# Patient Record
Sex: FEMALE | Race: WHITE | NOT HISPANIC OR LATINO | Employment: FULL TIME | URBAN - METROPOLITAN AREA
[De-identification: names, ages, dates, MRNs, and addresses within clinical notes are randomized per-mention and may not be internally consistent; named-entity substitution may affect disease eponyms.]

---

## 2017-03-09 ENCOUNTER — ALLSCRIPTS OFFICE VISIT (OUTPATIENT)
Dept: OTHER | Facility: OTHER | Age: 47
End: 2017-03-09

## 2017-04-20 ENCOUNTER — ALLSCRIPTS OFFICE VISIT (OUTPATIENT)
Dept: OTHER | Facility: OTHER | Age: 47
End: 2017-04-20

## 2017-08-01 ENCOUNTER — ALLSCRIPTS OFFICE VISIT (OUTPATIENT)
Dept: OTHER | Facility: OTHER | Age: 47
End: 2017-08-01

## 2017-08-01 DIAGNOSIS — R10.2 PELVIC AND PERINEAL PAIN: ICD-10-CM

## 2017-08-02 ENCOUNTER — HOSPITAL ENCOUNTER (OUTPATIENT)
Dept: ULTRASOUND IMAGING | Facility: HOSPITAL | Age: 47
Discharge: HOME | End: 2017-08-02
Payer: COMMERCIAL

## 2017-08-02 DIAGNOSIS — R10.2 PELVIC AND PERINEAL PAIN: ICD-10-CM

## 2017-08-02 PROCEDURE — 76830 TRANSVAGINAL US NON-OB: CPT

## 2017-08-02 PROCEDURE — 76856 US EXAM PELVIC COMPLETE: CPT

## 2017-10-05 ENCOUNTER — ALLSCRIPTS OFFICE VISIT (OUTPATIENT)
Dept: OTHER | Facility: OTHER | Age: 47
End: 2017-10-05

## 2017-10-05 DIAGNOSIS — M25.512 PAIN IN LEFT SHOULDER: ICD-10-CM

## 2017-10-25 ENCOUNTER — APPOINTMENT (OUTPATIENT)
Dept: PHYSICAL THERAPY | Facility: CLINIC | Age: 47
End: 2017-10-25
Payer: COMMERCIAL

## 2017-10-25 DIAGNOSIS — M25.512 PAIN IN LEFT SHOULDER: ICD-10-CM

## 2017-10-25 PROCEDURE — 97162 PT EVAL MOD COMPLEX 30 MIN: CPT

## 2017-10-25 PROCEDURE — G8985 CARRY GOAL STATUS: HCPCS

## 2017-10-25 PROCEDURE — 97110 THERAPEUTIC EXERCISES: CPT

## 2017-10-25 PROCEDURE — G8984 CARRY CURRENT STATUS: HCPCS

## 2017-10-31 ENCOUNTER — APPOINTMENT (OUTPATIENT)
Dept: PHYSICAL THERAPY | Facility: CLINIC | Age: 47
End: 2017-10-31
Payer: COMMERCIAL

## 2017-10-31 PROCEDURE — 97140 MANUAL THERAPY 1/> REGIONS: CPT

## 2017-10-31 PROCEDURE — 97110 THERAPEUTIC EXERCISES: CPT

## 2017-11-03 ENCOUNTER — APPOINTMENT (OUTPATIENT)
Dept: PHYSICAL THERAPY | Facility: CLINIC | Age: 47
End: 2017-11-03
Payer: COMMERCIAL

## 2017-11-03 PROCEDURE — 97140 MANUAL THERAPY 1/> REGIONS: CPT

## 2017-11-03 PROCEDURE — 97110 THERAPEUTIC EXERCISES: CPT

## 2017-11-06 ENCOUNTER — APPOINTMENT (OUTPATIENT)
Dept: PHYSICAL THERAPY | Facility: CLINIC | Age: 47
End: 2017-11-06
Payer: COMMERCIAL

## 2017-11-06 PROCEDURE — 97110 THERAPEUTIC EXERCISES: CPT

## 2017-11-06 PROCEDURE — 97140 MANUAL THERAPY 1/> REGIONS: CPT

## 2017-11-08 ENCOUNTER — APPOINTMENT (OUTPATIENT)
Dept: PHYSICAL THERAPY | Facility: CLINIC | Age: 47
End: 2017-11-08
Payer: COMMERCIAL

## 2017-11-08 PROCEDURE — 97140 MANUAL THERAPY 1/> REGIONS: CPT

## 2017-11-08 PROCEDURE — 97112 NEUROMUSCULAR REEDUCATION: CPT

## 2017-11-08 PROCEDURE — 97110 THERAPEUTIC EXERCISES: CPT

## 2017-11-13 ENCOUNTER — APPOINTMENT (OUTPATIENT)
Dept: PHYSICAL THERAPY | Facility: CLINIC | Age: 47
End: 2017-11-13
Payer: COMMERCIAL

## 2017-11-14 ENCOUNTER — APPOINTMENT (OUTPATIENT)
Dept: PHYSICAL THERAPY | Facility: CLINIC | Age: 47
End: 2017-11-14
Payer: COMMERCIAL

## 2017-11-14 PROCEDURE — 97110 THERAPEUTIC EXERCISES: CPT

## 2017-11-14 PROCEDURE — 97112 NEUROMUSCULAR REEDUCATION: CPT

## 2017-11-14 PROCEDURE — 97140 MANUAL THERAPY 1/> REGIONS: CPT

## 2017-11-21 ENCOUNTER — APPOINTMENT (OUTPATIENT)
Dept: PHYSICAL THERAPY | Facility: CLINIC | Age: 47
End: 2017-11-21
Payer: COMMERCIAL

## 2017-11-27 ENCOUNTER — APPOINTMENT (OUTPATIENT)
Dept: PHYSICAL THERAPY | Facility: CLINIC | Age: 47
End: 2017-11-27
Payer: COMMERCIAL

## 2017-11-27 PROCEDURE — 97140 MANUAL THERAPY 1/> REGIONS: CPT

## 2017-11-27 PROCEDURE — 97110 THERAPEUTIC EXERCISES: CPT

## 2017-11-29 ENCOUNTER — APPOINTMENT (OUTPATIENT)
Dept: PHYSICAL THERAPY | Facility: CLINIC | Age: 47
End: 2017-11-29
Payer: COMMERCIAL

## 2018-01-10 NOTE — PROGRESS NOTES
Assessment    1  Encounter for preventive health examination (V70 0) (Z00 00)   2  Need for vaccination (V05 9) (Z23)   3  Mild vitamin D deficiency (268 9) (E55 9)    Plan  Asthma    · Ventolin  (90 Base) MCG/ACT Inhalation Aerosol Solution; INHALE 1 TO  2 PUFFS EVERY 4 TO 6 HOURS AS NEEDED  Essential hypertension    · (1) BASIC METABOLIC PROFILE; Status:Active; Requested for:01Sep2017;    · (1) CBC/PLT/DIFF; Status:Active; Requested for:01Sep2017;    · (1) HEPATIC FUNCTION PANEL; Status:Active; Requested for:01Sep2017; Health Maintenance    · Propranolol HCl - 10 MG Oral Tablet; 1 tab bid prn  Mild vitamin D deficiency    · Vitamin D3 2000 UNIT Oral Tablet; 1 PO QD  Need for vaccination    · Tdap (Boostrix); Inject one dose; To Be Done: [id number]  Screening for cardiovascular condition    · (1) LIPID PANEL, FASTING; Status:Active; Requested for:01Sep2017;     Discussion/Summary  health maintenance visit Currently, she eats a healthy diet  the risks and benefits of cervical cancer screening were discussed cervical cancer screening is current Breast cancer screening: the risks and benefits of breast cancer screening were discussed, monthly self breast exam was advised and mammogram is current  Colorectal cancer screening: the risks and benefits of colorectal cancer screening were discussed and colorectal cancer screening is not indicated  Osteoporosis screening: the risks and benefits of osteoporosis screening were discussed  Advice and education were given regarding nutrition, aerobic exercise, weight bearing exercise, weight loss, calcium supplements, vitamin D supplements, alcohol use, sunscreen use and seat belt use  Patient discussion: discussed with the patient  Keep up the great work, follow-up for physical after labs in one year and sooner as needed  Chief Complaint  Well Visit      History of Present Illness  HM, Adult Female:  The patient is being seen for a health maintenance evaluation  The last health maintenance visit was 12 month(s) ago  General Health: The patient's health since the last visit is described as good  She has regular dental visits  She denies vision problems  She denies hearing loss  Immunizations status: up to date  Lifestyle:  She consumes a diverse and healthy diet  She does not have any weight concerns  She exercises regularly  She does not use tobacco  She consumes alcohol  She denies drug use  Reproductive health: the patient is premenopausal   she reports normal menses  she uses contraception  she is sexually active  pregnancy history:  Screening: cancer screening reviewed and current  metabolic screening reviewed and current  risk screening reviewed and current  HPI: Feeling well  Exercising 7 days per week, added resistance when wt loss plateaued  Doing [employer] and well as [employer]         Review of Systems    Constitutional: No fever, no chills, feels well, no tiredness, no recent weight gain or weight loss  Eyes: No complaints of eye pain, no red eyes, no eyesight problems, no discharge, no dry eyes, no itching of eyes  ENT: no complaints of earache, no loss of hearing, no nose bleeds, no nasal discharge, no sore throat, no hoarseness  Cardiovascular: No complaints of slow heart rate, no fast heart rate, no chest pain, no palpitations, no leg claudication, no lower extremity edema  Respiratory: No complaints of shortness of breath, no wheezing, no cough, no SOB on exertion, no orthopnea, no PND  Gastrointestinal: No complaints of abdominal pain, no constipation, no nausea or vomiting, no diarrhea, no bloody stools  Genitourinary: No complaints of dysuria, no incontinence, no pelvic pain, no dysmenorrhea, no vaginal discharge or bleeding  Musculoskeletal: No complaints of arthralgias, no myalgias, no joint swelling or stiffness, no limb pain or swelling     Integumentary: No complaints of skin rash or lesions, no itching, no skin wounds, no breast pain or lump  Neurological: No complaints of headache, no confusion, no convulsions, no numbness, no dizziness or fainting, no tingling, no limb weakness, no difficulty walking  Psychiatric: Not suicidal, no sleep disturbance, no anxiety or depression, no change in personality, no emotional problems  Endocrine: No complaints of proptosis, no hot flashes, no muscle weakness, no deepening of the voice, no feelings of weakness  Hematologic/Lymphatic: No complaints of swollen glands, no swollen glands in the neck, does not bleed easily, does not bruise easily  Active Problems    1  Acute bronchitis (466 0) (J20 9)   2  Allergy (995 3) (T78 40XA)   3  Anxiety (300 00) (F41 9)   4  Asthma (493 90) (J45 909)   5  Asthma exacerbation (493 92) (J45 901)   6  Essential hypertension (401 9) (I10)   7  Influenza (487 1) (J11 1)   8  Need for influenza vaccination (V04 81) (Z23)   9  Plantar fasciitis (728 71) (M72 2)   10  Tongue burning sensation (529 6) (K14 6)   11  White coat hypertension (796 2) (R03 0)    Surgical History    · History of Wrist Surgery    Family History  Mother    · Family history of hypertension (V17 49) (Z82 49)   · Family history of hypoglycemia (V18 19) (Z83 49)   · Family history of Type 2 diabetes, controlled, with neuropathy  Father    · Family history of hypertension (V17 49) (Z82 49)   · Family history of Type 2 diabetes, controlled, with neuropathy    Social History    · Alcohol use   · Former smoker (V15 82) (M35 867)   · No drug use    Current Meds   1  Allegra 60 MG CAPS Recorded   2  Fluticasone Propionate 50 MCG/ACT Nasal Suspension; USE 2 SPRAYS IN EACH   NOSTRIL ONCE DAILY Recorded   3  Ipratropium-Albuterol 0 5-2 5 (3) MG/3ML Inhalation Solution; USE 1 UNIT DOSE IN   NEBULIZER EVERY 4 HOURS AS NEEDED; Therapy: 29QFZ7272 to (Last Rx:22Rvr7830)  Requested for: 46Ebg7858 Ordered   4   Montelukast Sodium 10 MG Oral Tablet; take 1 tablet by mouth every day; Therapy: 37FKU4194 to (Last Rx:60Oos4334)  Requested for: 94Tru5493 Ordered   5  Mucinex DM Maximum Strength  MG Oral Tablet Extended Release 12 Hour   Recorded   6  Propranolol HCl - 10 MG Oral Tablet; 1 tab bid prn  Requested for: 01Sep2015; Last   Rx:01Sep2015 Ordered   7  Ventolin  (90 Base) MCG/ACT Inhalation Aerosol Solution; INHALE 1 TO 2   PUFFS EVERY 4 TO 6 HOURS AS NEEDED; Therapy: 01Sep2015 to (Last Rx:01Sep2015)  Requested for: 01Sep2015 Ordered    Allergies    1  Aspirin TABS   2  Penicillins    Vitals   Recorded: 28ZGT1446 06:69TI   Systolic 233   Diastolic 82   Heart Rate 78   Respiration 14   Height 5 ft 5 in   Weight 161 lb 2 08 oz   BMI Calculated 26 81   BSA Calculated 1 81     Physical Exam    Constitutional   General appearance: No acute distress, well appearing and well nourished  Eyes   Conjunctiva and lids: No swelling, erythema or discharge  Pupils and irises: Equal, round and reactive to light  Ears, Nose, Mouth, and Throat   External inspection of ears and nose: Normal     Otoscopic examination: Tympanic membranes translucent with normal light reflex  Canals patent without erythema  Oropharynx: Normal with no erythema, edema, exudate or lesions  Pulmonary   Respiratory effort: No increased work of breathing or signs of respiratory distress  Auscultation of lungs: Clear to auscultation  Cardiovascular   Auscultation of heart: Normal rate and rhythm, normal S1 and S2, without murmurs  Examination of extremities for edema and/or varicosities: Normal     Abdomen   Abdomen: Non-tender, no masses  Liver and spleen: No hepatomegaly or splenomegaly  Lymphatic   Palpation of lymph nodes in neck: No lymphadenopathy  Musculoskeletal   Gait and station: Normal     Digits and nails: Normal without clubbing or cyanosis      Inspection/palpation of joints, bones, and muscles: Normal     Skin   Skin and subcutaneous tissue: Normal without rashes or lesions  Neurologic   Cranial nerves: Cranial nerves 2-12 intact  Psychiatric   Orientation to person, place, and time: Normal     Mood and affect: Normal        Results/Data  PHQ-2 Adult Depression Screening 13Tyc5628 11:20AM User, s     Test Name Result Flag Reference   PHQ-2 Adult Depression Score 0     Over the last two weeks, how often have you been bothered by any of the following problems?   Little interest or pleasure in doing things: Not at all - 0  Feeling down, depressed, or hopeless: Not at all - 0   PHQ-2 Adult Depression Screening Negative         Signatures   Electronically signed by : MARY Scott ; Sep 13 2016 11:58AM EST                       (Author)

## 2018-01-13 VITALS
BODY MASS INDEX: 26.66 KG/M2 | WEIGHT: 160 LBS | TEMPERATURE: 98.3 F | OXYGEN SATURATION: 98 % | HEIGHT: 65 IN | SYSTOLIC BLOOD PRESSURE: 154 MMHG | DIASTOLIC BLOOD PRESSURE: 92 MMHG | HEART RATE: 72 BPM

## 2018-01-14 VITALS
DIASTOLIC BLOOD PRESSURE: 98 MMHG | WEIGHT: 159.38 LBS | SYSTOLIC BLOOD PRESSURE: 152 MMHG | BODY MASS INDEX: 26.55 KG/M2 | HEIGHT: 65 IN

## 2018-01-17 ENCOUNTER — ALLSCRIPTS OFFICE VISIT (OUTPATIENT)
Dept: OTHER | Facility: OTHER | Age: 48
End: 2018-01-17

## 2018-01-17 NOTE — PROGRESS NOTES
Assessment    1  Asthma exacerbation (493 92) (J45 901)   2  Allergy (995 3) (T78 40XA)    Plan  Allergy    · Fluticasone Propionate 50 MCG/ACT Nasal Suspension; USE 2 SPRAYS IN  EACH NOSTRIL ONCE DAILY  Asthma    · Advair Diskus 100-50 MCG/DOSE Inhalation Aerosol Powder Breath Activated;  INHALE 1 PUFF TWICE DAILY   · PredniSONE 10 MG Oral Tablet; take 3 tabs daily x 2 days then 2 tabs daily x 2 days  then 1 tab x 1 day    Discussion/Summary    Exacerbation of asthma she has no wheezing office today likely worsen at nighttime secondary to postnasal drip  Continue Allegra, add Flonase consider restarting Singulair prescription for Advair given if symptoms are not improving over the next 2-3 days okay to start prednisone contact us if not improving  Chief Complaint  Not feeling well      History of Present Illness  HPI: Patient states she started with typical cold symptoms then she seemed to get better within a day or so later she noticed a heaviness and wheezing she does have history of asthma  She was using her nebulizer nebulizer 2 times a day and rescue inhaler if needed she is taking Allegra and Mucinex  Review of Systems    Constitutional: as noted in HPI    ENT: as noted in HPI  Cardiovascular: no complaints of slow or fast heart rate, no chest pain, no palpitations, no leg claudication or lower extremity edema  Respiratory: as noted in HPI  Breasts: no complaints of breast pain, breast lump or nipple discharge  Gastrointestinal: no complaints of abdominal pain, no constipation, no nausea or diarrhea, no vomiting, no bloody stools  Active Problems    1  Acute bronchitis (466 0) (J20 9)   2  Allergy (995 3) (T78 40XA)   3  Anxiety (300 00) (F41 9)   4  Asthma (493 90) (J45 909)   5  Asthma exacerbation (493 92) (J45 901)   6  Essential hypertension (401 9) (I10)   7  Influenza (487 1) (J11 1)   8  Mild vitamin D deficiency (268 9) (E55 9)   9   Need for influenza vaccination (V04 81) (Z23)   10  Need for vaccination (V05 9) (Z23)   11  Plantar fasciitis (728 71) (M72 2)   12  Screening for cardiovascular condition (V81 2) (Z13 6)   13  Tongue burning sensation (529 6) (K14 6)   14  White coat hypertension    Past Medical History  Active Problems And Past Medical History Reviewed: The active problems and past medical history were reviewed and updated today  Surgical History  Surgical History Reviewed: The surgical history was reviewed and updated today  Social History    · Alcohol use   · Former smoker (I21 75) (E36 327)   · No drug use  The social history was reviewed and updated today  Family History  Family History Reviewed: The family history was reviewed and updated today  Current Meds   1  Allegra 60 MG CAPS Recorded   2  Fluticasone Propionate 50 MCG/ACT Nasal Suspension; USE 2 SPRAYS IN EACH   NOSTRIL ONCE DAILY Recorded   3  Ipratropium-Albuterol 0 5-2 5 (3) MG/3ML Inhalation Solution; USE 1 UNIT DOSE IN   NEBULIZER EVERY 4 HOURS AS NEEDED; Therapy: 66SAR6337 to (Last Rx:58Fge2614)  Requested for: 20Rpn7333 Ordered   4  Montelukast Sodium 10 MG Oral Tablet; take 1 tablet by mouth every day; Therapy: 59BEK6386 to (Last Rx:50Kdv3553)  Requested for: 21Clt8427 Ordered   5  Propranolol HCl - 10 MG Oral Tablet; 1 tab bid prn  Requested for: 66Szd1846; Last   Rx:50Yws9867 Ordered   6  Ventolin  (90 Base) MCG/ACT Inhalation Aerosol Solution; INHALE 1 TO 2 PUFFS   EVERY 4 TO 6 HOURS AS NEEDED; Therapy: 83Mpg5936 to (Last Rx:35Nzj8121)  Requested for: 97Rip3818 Ordered   7  Vitamin D3 2000 UNIT Oral Tablet; 1 PO QD; Therapy: 74Xaj6920 to (Last Rx:94Zvr6336) Ordered    The medication list was reviewed and updated today  Allergies    1  Aspirin TABS   2   Penicillins    Vitals   Recorded: 76VXE2806 11:23AM   Heart Rate 80   Systolic 753   Diastolic 98   Height 5 ft 5 in   Weight 153 lb    BMI Calculated 25 46   BSA Calculated 1 77     Physical Exam    Constitutional   General appearance: No acute distress, well appearing and well nourished  Pulmonary   Auscultation of lungs: Clear to auscultation  Cardiovascular   Auscultation of heart: Normal rate and rhythm, normal S1 and S2, without murmurs  Abdomen   Abdomen: Non-tender, no masses  Lymphatic   Palpation of lymph nodes in neck: No lymphadenopathy  Skin   Skin and subcutaneous tissue: Normal without rashes or lesions  Future Appointments    Date/Time Provider Specialty Site   09/15/2017 12:30 PM MARY Riggs   Internal Medicine [facility]     Signatures   Electronically signed by : LEIF Mayo; Mar  9 2017 12:40PM EST                       (Author)    Electronically signed by : MARY Curran ; Mar 10 2017 12:39PM EST

## 2018-01-17 NOTE — PROGRESS NOTES
Assessment    1  Encounter for preventive health examination (V70 0) (Z00 00)   2  Allergy (995 3) (T78 40XA)   3  Anxiety (300 00) (F41 9)   4  Asthma (493 90) (J45 909)   5  Mild vitamin D deficiency (268 9) (E55 9)   6  Left shoulder pain (719 41) (M25 512)    Plan  Asthma    · Ventolin  (90 Base) MCG/ACT Inhalation Aerosol Solution; INHALE 1 TO  2 PUFFS EVERY 4 TO 6 HOURS AS NEEDED  Health Maintenance    · Propranolol HCl - 10 MG Oral Tablet; 1 tab bid prn  Left shoulder pain    · *1 - [facility]  *  Status: Active  Requested for: 10WFR3660  Care Summary provided  : Yes  Need for influenza vaccination    · Fluzone Quadrivalent Intramuscular Suspension    Discussion/Summary  health maintenance visit cervical cancer screening is current Breast cancer screening: monthly self breast exam was advised and mammogram is current  Colorectal cancer screening: colorectal cancer screening is not indicated  Osteoporosis screening: bone mineral density testing is not indicated  Advice and education were given regarding nutrition, aerobic exercise, weight bearing exercise, vitamin D supplements and sunscreen use  Lab data reviewed in detail and compared to prior, all normal    Anxiety-agree with seeing therapist, continue with propranolol as needed for presentations, okay to repeat dose after 3-4 hours     Asthma and allergy are stable on present regimen    Mild vitamin D deficiency-I recommend 800-2000 international units vitamin D 3 supplements daily    Left shoulder pain consistent with rotator cuff tendinopathy-pathophysiology discussed-refer for physical therapy  Routine follow-up in one year, sooner as needed  Chief Complaint  Well Visit      History of Present Illness  HM, Adult Female: The patient is being seen for a health maintenance evaluation  The last health maintenance visit was 14 month(s) ago  General Health:  The patient's health since the last visit is described as good  She has regular dental visits  She denies vision problems  She denies hearing loss  Immunizations status: up to date  Lifestyle:  She consumes a diverse and healthy diet  She has weight concerns  She exercises regularly  She does not use tobacco  She consumes alcohol  She denies drug use  Reproductive health: the patient is perimenopausal   she reports normal menses  she uses contraception  she is sexually active  pregnancy history:  Screening: cancer screening reviewed and current  metabolic screening reviewed and current  risk screening reviewed and current  HPI: Feeling generally well  Gained some wt back over the summer eating w/ kids home from college  Exercising 6 days per week, cardio, wts  No exertional sx  Asthma has been stable, using nasonex otc, hasn't used singulair or allegra  rarely needs proair or nebs  Anxiety-using propranolol only prior to doing a presentation, but 8 hrs of training and wears off  Wants to consider seeing a therapist, has seen Reji Santiago  Notes persistent L shoulder pain, worse w/ certain movements  No trouble sleeping  No trauma  Pain is 2/10  Hasn't taken anything or done anything  Vit D deficiency-admits not taking supplements  Review of Systems    Constitutional: No fever, no chills, feels well, no tiredness, no recent weight gain or weight loss  Eyes: No complaints of eye pain, no red eyes, no eyesight problems, no discharge, no dry eyes, no itching of eyes  ENT: no complaints of earache, no loss of hearing, no nose bleeds, no nasal discharge, no sore throat, no hoarseness  Cardiovascular: No complaints of slow heart rate, no fast heart rate, no chest pain, no palpitations, no leg claudication, no lower extremity edema  Respiratory: No complaints of shortness of breath, no wheezing, no cough, no SOB on exertion, no orthopnea, no PND     Gastrointestinal: No complaints of abdominal pain, no constipation, no nausea or vomiting, no diarrhea, no bloody stools  Genitourinary: No complaints of dysuria, no incontinence, no pelvic pain, no dysmenorrhea, no vaginal discharge or bleeding  Musculoskeletal: No complaints of arthralgias, no myalgias, no joint swelling or stiffness, no limb pain or swelling  Integumentary: No complaints of skin rash or lesions, no itching, no skin wounds, no breast pain or lump  Neurological: No complaints of headache, no confusion, no convulsions, no numbness, no dizziness or fainting, no tingling, no limb weakness, no difficulty walking  Psychiatric: Not suicidal, no sleep disturbance, no anxiety or depression, no change in personality, no emotional problems  Endocrine: No complaints of proptosis, no hot flashes, no muscle weakness, no deepening of the voice, no feelings of weakness  Hematologic/Lymphatic: No complaints of swollen glands, no swollen glands in the neck, does not bleed easily, does not bruise easily  Active Problems    1  Acute bronchitis (466 0) (J20 9)   2  Acute otitis externa (380 10) (H60 509)   3  Allergy (995 3) (T78 40XA)   4  Anxiety (300 00) (F41 9)   5  Asthma (493 90) (J45 909)   6  Asthma exacerbation (493 92) (J45 901)   7  Essential hypertension (401 9) (I10)   8  Female pelvic pain (625 9) (R10 2)   9  Influenza (487 1) (J11 1)   10  Mild vitamin D deficiency (268 9) (E55 9)   11  Need for influenza vaccination (V04 81) (Z23)   12  Need for vaccination (V05 9) (Z23)   13  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)   14  Plantar fasciitis (728 71) (M72 2)   15  Screening for cardiovascular condition (V81 2) (Z13 6)   16  Tongue burning sensation (529 6) (K14 6)   17   White coat hypertension    Surgical History    · History of Wrist Surgery    Family History  Mother    · Family history of hypertension (V17 49) (Z82 49)   · Family history of hypoglycemia (V18 19) (Z83 49)   · Family history of Type 2 diabetes, controlled, with neuropathy  Father    · Family history of hypertension (V17 49) (Z82 49)   · Family history of Type 2 diabetes, controlled, with neuropathy    Social History    · Alcohol use   · Former smoker (V15 82) (G55 675)   · No drug use    Current Meds   1  Allegra 60 MG CAPS Recorded   2  Fluticasone Propionate 50 MCG/ACT Nasal Suspension; USE 2 SPRAYS IN EACH   NOSTRIL ONCE DAILY  Requested for: 32POI9239; Last Rx:09Mar2017 Ordered   3  Ipratropium-Albuterol 0 5-2 5 (3) MG/3ML Inhalation Solution; USE 1 UNIT DOSE IN   NEBULIZER EVERY 4 HOURS AS NEEDED; Therapy: 04VOZ2629 to (Last Rx:40Nig3075)  Requested for: 11Gqz1826 Ordered   4  Propranolol HCl - 10 MG Oral Tablet; 1 tab bid prn  Requested for: 63Ooo8291; Last   Rx:30Sle9754 Ordered   5  Ventolin  (90 Base) MCG/ACT Inhalation Aerosol Solution; INHALE 1 TO 2   PUFFS EVERY 4 TO 6 HOURS AS NEEDED; Therapy: 28Wmw6395 to (Last Rx:03Pbr1819)  Requested for: 84Ttu1486 Ordered   6  Vitamin D3 2000 UNIT Oral Tablet; 1 PO QD; Therapy: 13Rdw8315 to (Last Rx:82Zgd9137) Ordered    Allergies    1  Aspirin TABS   2  Penicillins    Vitals   Recorded: 11ZWN2577 02:06PM   Heart Rate 80   Respiration 14   Systolic 332   Diastolic 86   Height 5 ft 5 in   Weight 163 lb 4 oz   BMI Calculated 27 17   BSA Calculated 1 81     Physical Exam    Constitutional   General appearance: No acute distress, well appearing and well nourished  Eyes   Conjunctiva and lids: No swelling, erythema or discharge  Pupils and irises: Equal, round and reactive to light  Ears, Nose, Mouth, and Throat   External inspection of ears and nose: Normal     Oropharynx: Normal with no erythema, edema, exudate or lesions  Pulmonary   Respiratory effort: No increased work of breathing or signs of respiratory distress  Auscultation of lungs: Clear to auscultation  Cardiovascular   Auscultation of heart: Normal rate and rhythm, normal S1 and S2, without murmurs      Examination of extremities for edema and/or varicosities: Normal     Abdomen   Abdomen: Non-tender, no masses  Liver and spleen: No hepatomegaly or splenomegaly  Lymphatic   Palpation of lymph nodes in neck: No lymphadenopathy  Musculoskeletal   Gait and station: Normal     Inspection/palpation of joints, bones, and muscles: Abnormal   Bilateral shoulders with full range of motion, proximal and distal strength 5 out of 5 and equal bilaterally, increased pain with external rotation and abduction beyond 15 degrees  of the left shoulder  Skin   Skin and subcutaneous tissue: Normal without rashes or lesions  Neurologic   Cranial nerves: Cranial nerves 2-12 intact      Psychiatric   Orientation to person, place, and time: Normal     Mood and affect: Normal        Signatures   Electronically signed by : MARY Meyer ; Oct  5 2017  2:47PM EST                       (Author)

## 2018-01-18 NOTE — PROGRESS NOTES
Assessment   1  Asthma exacerbation (493 92) (J45 901)   2  Anxiety (300 00) (F41 9)   3  Essential hypertension (401 9) (I10)    Plan   Asthma exacerbation    · PredniSONE 10 MG Oral Tablet; TAKE 4 TABLETS DAILY FOR 2 DAYS,3 TABLETS DAILY FOR    2 DAYS, 2 TABLETS DAILY FOR 2 DAYS AND 1 TABLET a day   · Follow Up if Not Better Evaluation and Treatment  Follow-up  Status: Complete  Done: [id number]   · Follow-up as previously scheduled Evaluation and Treatment  Follow-up  Status: Complete  Done:    [id number]    Discussion/Summary   Discussion Summary:    No sign of any infection she will continue all of her treatments add a course of steroids should return if she worsens  Medication SE Review and Pt Understands Tx: Possible side effects of new medications were reviewed with the patient/guardian today  The treatment plan was reviewed with the patient/guardian  The patient/guardian understands and agrees with the treatment plan      Chief Complaint   Chief Complaint Free Text Note Form: Wheezing shortness of breath      History of Present Illness   HPI: She is having increased wheezing shortness of breath over the past 2 weeks  She is using her inhalers and nebulizers more and more without any lasting benefit today she began taking her nebulizer to work  Minimal coughing no chest pain no obvious fever or chills no other cold symptoms today she has somewhat of a headache    Anxiety Disorder (Follow-Up): The patient is being seen for follow-up of anxiety  The patient reports doing well  She has no comorbid illnesses  She has had no significant interval events  The patient is currently asymptomatic  Disease management:  the patient is doing well with her goals  Asthma, Acute Episode (Brief): The patient is being seen for a routine clinic follow-up of asthma  The patient's long-term asthma pattern has been classified as intermittent  Symptoms:  dyspnea,-- wheezing,-- chest tightness-- and-- non-productive cough  The patient is currently experiencing symptoms  No associated symptoms are reported  Hypertension (Follow-Up): The patient presents for follow-up of essential hypertension  She has no significant interval events  Symptoms: The patient is currently asymptomatic  Disease Management: the patient is doing well with her blood pressure goals  Review of Systems   Complete-Female:      Constitutional: feeling tired, but-- as noted in HPI,-- no fever-- and-- no chills  Eyes: No complaints of eye pain, no red eyes, no eyesight problems, no discharge, no dry eyes, no itching of eyes  ENT: no complaints of earache, no loss of hearing, no nose bleeds, no nasal discharge, no sore throat, no hoarseness  Cardiovascular: No complaints of slow heart rate, no fast heart rate, no chest pain, no palpitations, no leg claudication, no lower extremity edema  Respiratory: as noted in HPI  Gastrointestinal: No complaints of abdominal pain, no constipation, no nausea or vomiting, no diarrhea, no bloody stools  Genitourinary: No complaints of dysuria, no incontinence, no pelvic pain, no dysmenorrhea, no vaginal discharge or bleeding  Musculoskeletal: No complaints of arthralgias, no myalgias, no joint swelling or stiffness, no limb pain or swelling  Integumentary: No complaints of skin rash or lesions, no itching, no skin wounds, no breast pain or lump  Neurological: No complaints of headache, no confusion, no convulsions, no numbness, no dizziness or fainting, no tingling, no limb weakness, no difficulty walking  Psychiatric: Not suicidal, no sleep disturbance, no anxiety or depression, no change in personality, no emotional problems  Endocrine: No complaints of proptosis, no hot flashes, no muscle weakness, no deepening of the voice, no feelings of weakness        Hematologic/Lymphatic: No complaints of swollen glands, no swollen glands in the neck, does not bleed easily, does not bruise easily  ROS Reviewed:    ROS reviewed  Active Problems   1  Acute bronchitis (466 0) (J20 9)   2  Acute otitis externa (380 10) (H60 509)   3  Allergy (995 3) (T78 40XA)   4  Anxiety (300 00) (F41 9)   5  Asthma (493 90) (J45 909)   6  Asthma exacerbation (493 92) (J45 901)   7  Essential hypertension (401 9) (I10)   8  Female pelvic pain (625 9) (R10 2)   9  Influenza (487 1) (J11 1)   10  Left shoulder pain (719 41) (M25 512)   11  Mild vitamin D deficiency (268 9) (E55 9)   12  Need for influenza vaccination (V04 81) (Z23)   13  Need for vaccination (V05 9) (Z23)   14  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)   15  Plantar fasciitis (728 71) (M72 2)   16  Screening for cardiovascular condition (V81 2) (Z13 6)   17  Tongue burning sensation (529 6) (K14 6)   18  White coat hypertension    Past Medical History   Active Problems And Past Medical History Reviewed: The active problems and past medical history were reviewed and updated today  Surgical History   1  History of Wrist Surgery  Surgical History Reviewed: The surgical history was reviewed and updated today  Family History   Mother    1  Family history of hypertension (V17 49) (Z82 49)   2  Family history of hypoglycemia (V18 19) (Z83 49)   3  Family history of Type 2 diabetes, controlled, with neuropathy  Father    4  Family history of hypertension (V17 49) (Z82 49)   5  Family history of Type 2 diabetes, controlled, with neuropathy  Family History Reviewed: The family history was reviewed and updated today  Social History    · Alcohol use   · Former smoker (N53 65) (N07 474)   · No drug use  Social History Reviewed: The social history was reviewed and updated today  Current Meds    1  Advair Diskus 100-50 MCG/DOSE Inhalation Aerosol Powder Breath Activated; INHALE 1 PUFF BY     MOUTH TWICE DAILY; Therapy: 03FXP7596 to (Helga Gums)  Requested for: 53FZN0359; Last Rx:03Jan2018     Ordered   2   Allegra 60 MG CAPS Recorded   3  Fluticasone Propionate 50 MCG/ACT Nasal Suspension; USE 2 SPRAYS IN EACH NOSTRIL ONCE     DAILY  Requested for: 53TCM7430; Last Rx:09Mar2017 Ordered   4  Ipratropium-Albuterol 0 5-2 5 (3) MG/3ML Inhalation Solution; USE 1 UNIT DOSE IN NEBULIZER     EVERY 4 HOURS AS NEEDED; Therapy: 18GOM8943 to (Last Rx:22Fky1033)  Requested for: 98Pnn7401 Ordered   5  Propranolol HCl - 10 MG Oral Tablet; 1 tab bid prn  Requested for: 78ZAX0458; Last Rx:05Oct2017     Ordered   6  Ventolin  (90 Base) MCG/ACT Inhalation Aerosol Solution; INHALE 1 TO 2 PUFFS EVERY 4     TO 6 HOURS AS NEEDED; Therapy: 84Wry9037 to (Last Rx:05Oct2017)  Requested for: 05Oct2017 Ordered   7  Vitamin D3 2000 UNIT Oral Tablet; 1 PO QD; Therapy: 21Gnx4434 to (Last Rx:68Msc7764) Ordered  Medication List Reviewed: The medication list was reviewed and updated today  Allergies   1  Aspirin TABS   2  Penicillins    Vitals   Vital Signs    Recorded: 06JMY1572 04:24PM   Temperature 98 2 F   Heart Rate 99   Systolic 278   Diastolic 74   Height 5 ft 5 in   Weight 172 lb 2 oz   BMI Calculated 28 64   BSA Calculated 1 86   O2 Saturation 99     Physical Exam        Constitutional      General appearance: No acute distress, well appearing and well nourished  Eyes      Conjunctiva and lids: No swelling, erythema or discharge  Pupils and irises: Equal, round and reactive to light  Ears, Nose, Mouth, and Throat      External inspection of ears and nose: Normal        Otoscopic examination: Tympanic membranes translucent with normal light reflex  Canals patent without erythema  Nasal mucosa, septum, and turbinates: Normal without edema or erythema  Oropharynx: Normal with no erythema, edema, exudate or lesions  Pulmonary      Respiratory effort: No increased work of breathing or signs of respiratory distress         Auscultation of lungs: Abnormal   Auscultation of the lungs revealed decreased breath sounds diffusely-- and-- diffuse wheezing  Cardiovascular      Palpation of heart: Normal PMI, no thrills  Auscultation of heart: Normal rate and rhythm, normal S1 and S2, without murmurs  Examination of extremities for edema and/or varicosities: Normal        Carotid pulses: Normal        Abdomen      Abdomen: Non-tender, no masses  Liver and spleen: No hepatomegaly or splenomegaly  Lymphatic      Palpation of lymph nodes in neck: No lymphadenopathy  Musculoskeletal      Gait and station: Normal        Digits and nails: Normal without clubbing or cyanosis  Inspection/palpation of joints, bones, and muscles: Normal        Skin      Skin and subcutaneous tissue: Normal without rashes or lesions  Neurologic      Cranial nerves: Cranial nerves 2-12 intact  Reflexes: 2+ and symmetric  Sensation: No sensory loss  Psychiatric      Orientation to person, place, and time: Normal        Mood and affect: Normal           Future Appointments      Date/Time Provider Specialty Site   10/08/2018 02:45 PM MARY Blackwell   Internal Medicine [facility]     Signatures    Electronically signed by : Neymar Varner, [address]; Jan 17 2018  6:04PM EST                       (Author)     Electronically signed by : MARY Nguyen ; Jan 17 2018  6:15PM EST

## 2018-01-22 VITALS
DIASTOLIC BLOOD PRESSURE: 86 MMHG | SYSTOLIC BLOOD PRESSURE: 132 MMHG | WEIGHT: 163.25 LBS | HEIGHT: 65 IN | HEART RATE: 80 BPM | BODY MASS INDEX: 27.2 KG/M2 | RESPIRATION RATE: 14 BRPM

## 2018-01-22 VITALS
SYSTOLIC BLOOD PRESSURE: 128 MMHG | WEIGHT: 153 LBS | DIASTOLIC BLOOD PRESSURE: 98 MMHG | HEART RATE: 80 BPM | BODY MASS INDEX: 25.49 KG/M2 | HEIGHT: 65 IN

## 2018-01-23 VITALS
SYSTOLIC BLOOD PRESSURE: 164 MMHG | BODY MASS INDEX: 28.68 KG/M2 | DIASTOLIC BLOOD PRESSURE: 74 MMHG | HEART RATE: 99 BPM | WEIGHT: 172.13 LBS | OXYGEN SATURATION: 99 % | TEMPERATURE: 98.2 F | HEIGHT: 65 IN

## 2018-01-26 ENCOUNTER — TELEPHONE (OUTPATIENT)
Dept: INTERNAL MEDICINE CLINIC | Facility: CLINIC | Age: 48
End: 2018-01-26

## 2018-01-26 DIAGNOSIS — J34.9 SINUS DISEASE: Primary | ICD-10-CM

## 2018-01-26 NOTE — TELEPHONE ENCOUNTER
Pls have the MA or  call about antibiotics that the pt can take  Pt has sinus infection and is going to Urgent Care; maybe going today    Pls call today

## 2018-01-26 NOTE — TELEPHONE ENCOUNTER
Pt saw Philippe Patel 1/17, says she was prescribed a steroid but think she needs an abx now  Still congested, facial pain, and ears feel clogged   Says she is allergic to pcn, cipro, and cant take zpak please send something if ok

## 2018-01-29 RX ORDER — DOXYCYCLINE HYCLATE 100 MG/1
100 CAPSULE ORAL EVERY 12 HOURS SCHEDULED
Qty: 14 CAPSULE | Refills: 0 | Status: SHIPPED | OUTPATIENT
Start: 2018-01-29 | End: 2018-02-05

## 2018-02-03 DIAGNOSIS — J45.909 UNCOMPLICATED ASTHMA, UNSPECIFIED ASTHMA SEVERITY, UNSPECIFIED WHETHER PERSISTENT: Primary | ICD-10-CM

## 2018-06-30 DIAGNOSIS — J45.909 UNCOMPLICATED ASTHMA, UNSPECIFIED ASTHMA SEVERITY, UNSPECIFIED WHETHER PERSISTENT: ICD-10-CM

## 2018-07-26 DIAGNOSIS — J45.909 UNCOMPLICATED ASTHMA, UNSPECIFIED ASTHMA SEVERITY, UNSPECIFIED WHETHER PERSISTENT: Primary | ICD-10-CM

## 2018-07-26 RX ORDER — IPRATROPIUM BROMIDE AND ALBUTEROL SULFATE 2.5; .5 MG/3ML; MG/3ML
1 SOLUTION RESPIRATORY (INHALATION) EVERY 4 HOURS PRN
COMMUNITY
Start: 2016-02-23 | End: 2018-07-26 | Stop reason: SDUPTHER

## 2018-07-27 RX ORDER — IPRATROPIUM BROMIDE AND ALBUTEROL SULFATE 2.5; .5 MG/3ML; MG/3ML
3 SOLUTION RESPIRATORY (INHALATION) EVERY 4 HOURS PRN
Qty: 120 VIAL | Refills: 3 | Status: SHIPPED | OUTPATIENT
Start: 2018-07-27 | End: 2019-03-21 | Stop reason: SDUPTHER

## 2018-07-31 DIAGNOSIS — J45.909 UNCOMPLICATED ASTHMA, UNSPECIFIED ASTHMA SEVERITY, UNSPECIFIED WHETHER PERSISTENT: ICD-10-CM

## 2018-08-20 ENCOUNTER — TELEPHONE (OUTPATIENT)
Dept: INTERNAL MEDICINE CLINIC | Facility: CLINIC | Age: 48
End: 2018-08-20

## 2018-08-20 ENCOUNTER — APPOINTMENT (OUTPATIENT)
Dept: LAB | Facility: HOSPITAL | Age: 48
End: 2018-08-20
Attending: INTERNAL MEDICINE
Payer: COMMERCIAL

## 2018-08-20 ENCOUNTER — OFFICE VISIT (OUTPATIENT)
Dept: INTERNAL MEDICINE CLINIC | Facility: CLINIC | Age: 48
End: 2018-08-20
Payer: COMMERCIAL

## 2018-08-20 VITALS
SYSTOLIC BLOOD PRESSURE: 146 MMHG | HEIGHT: 65 IN | HEART RATE: 84 BPM | DIASTOLIC BLOOD PRESSURE: 90 MMHG | RESPIRATION RATE: 12 BRPM | BODY MASS INDEX: 28.76 KG/M2 | WEIGHT: 172.6 LBS

## 2018-08-20 DIAGNOSIS — R10.9 LEFT FLANK PAIN: ICD-10-CM

## 2018-08-20 DIAGNOSIS — R10.9 LEFT FLANK PAIN: Primary | ICD-10-CM

## 2018-08-20 DIAGNOSIS — I10 ESSENTIAL HYPERTENSION: ICD-10-CM

## 2018-08-20 LAB
ALBUMIN SERPL BCP-MCNC: 3.8 G/DL (ref 3.5–5)
ALP SERPL-CCNC: 83 U/L (ref 46–116)
ALT SERPL W P-5'-P-CCNC: 26 U/L (ref 12–78)
ANION GAP SERPL CALCULATED.3IONS-SCNC: 8 MMOL/L (ref 4–13)
AST SERPL W P-5'-P-CCNC: 15 U/L (ref 5–45)
BACTERIA UR QL AUTO: ABNORMAL /HPF
BASOPHILS # BLD AUTO: 0.03 THOUSANDS/ΜL (ref 0–0.1)
BASOPHILS NFR BLD AUTO: 0 % (ref 0–1)
BILIRUB SERPL-MCNC: 0.5 MG/DL (ref 0.2–1)
BILIRUB UR QL STRIP: NEGATIVE
BUN SERPL-MCNC: 13 MG/DL (ref 5–25)
CALCIUM SERPL-MCNC: 8.7 MG/DL (ref 8.3–10.1)
CHLORIDE SERPL-SCNC: 102 MMOL/L (ref 100–108)
CLARITY UR: CLEAR
CO2 SERPL-SCNC: 29 MMOL/L (ref 21–32)
COLOR UR: YELLOW
CREAT SERPL-MCNC: 0.78 MG/DL (ref 0.6–1.3)
EOSINOPHIL # BLD AUTO: 0.03 THOUSAND/ΜL (ref 0–0.61)
EOSINOPHIL NFR BLD AUTO: 0 % (ref 0–6)
ERYTHROCYTE [DISTWIDTH] IN BLOOD BY AUTOMATED COUNT: 14 % (ref 11.6–15.1)
GFR SERPL CREATININE-BSD FRML MDRD: 90 ML/MIN/1.73SQ M
GLUCOSE SERPL-MCNC: 97 MG/DL (ref 65–140)
GLUCOSE UR STRIP-MCNC: NEGATIVE MG/DL
HCT VFR BLD AUTO: 39.2 % (ref 34.8–46.1)
HGB BLD-MCNC: 12.7 G/DL (ref 11.5–15.4)
HGB UR QL STRIP.AUTO: ABNORMAL
IMM GRANULOCYTES # BLD AUTO: 0.03 THOUSAND/UL (ref 0–0.2)
IMM GRANULOCYTES NFR BLD AUTO: 0 % (ref 0–2)
KETONES UR STRIP-MCNC: NEGATIVE MG/DL
LEUKOCYTE ESTERASE UR QL STRIP: NEGATIVE
LYMPHOCYTES # BLD AUTO: 1.53 THOUSANDS/ΜL (ref 0.6–4.47)
LYMPHOCYTES NFR BLD AUTO: 20 % (ref 14–44)
MCH RBC QN AUTO: 29.6 PG (ref 26.8–34.3)
MCHC RBC AUTO-ENTMCNC: 32.4 G/DL (ref 31.4–37.4)
MCV RBC AUTO: 91 FL (ref 82–98)
MONOCYTES # BLD AUTO: 0.34 THOUSAND/ΜL (ref 0.17–1.22)
MONOCYTES NFR BLD AUTO: 5 % (ref 4–12)
NEUTROPHILS # BLD AUTO: 5.64 THOUSANDS/ΜL (ref 1.85–7.62)
NEUTS SEG NFR BLD AUTO: 75 % (ref 43–75)
NITRITE UR QL STRIP: NEGATIVE
NON-SQ EPI CELLS URNS QL MICRO: ABNORMAL /HPF
NRBC BLD AUTO-RTO: 0 /100 WBCS
PH UR STRIP.AUTO: 7.5 [PH] (ref 4.5–8)
PLATELET # BLD AUTO: 301 THOUSANDS/UL (ref 149–390)
PMV BLD AUTO: 10.3 FL (ref 8.9–12.7)
POTASSIUM SERPL-SCNC: 3.6 MMOL/L (ref 3.5–5.3)
PROT SERPL-MCNC: 7.6 G/DL (ref 6.4–8.2)
PROT UR STRIP-MCNC: NEGATIVE MG/DL
RBC # BLD AUTO: 4.29 MILLION/UL (ref 3.81–5.12)
RBC #/AREA URNS AUTO: ABNORMAL /HPF
SODIUM SERPL-SCNC: 139 MMOL/L (ref 136–145)
SP GR UR STRIP.AUTO: <=1.005 (ref 1–1.03)
UROBILINOGEN UR QL STRIP.AUTO: 0.2 E.U./DL
WBC # BLD AUTO: 7.6 THOUSAND/UL (ref 4.31–10.16)
WBC #/AREA URNS AUTO: ABNORMAL /HPF

## 2018-08-20 PROCEDURE — 81001 URINALYSIS AUTO W/SCOPE: CPT | Performed by: INTERNAL MEDICINE

## 2018-08-20 PROCEDURE — 99214 OFFICE O/P EST MOD 30 MIN: CPT | Performed by: INTERNAL MEDICINE

## 2018-08-20 PROCEDURE — 80053 COMPREHEN METABOLIC PANEL: CPT

## 2018-08-20 PROCEDURE — 3008F BODY MASS INDEX DOCD: CPT | Performed by: INTERNAL MEDICINE

## 2018-08-20 PROCEDURE — 85025 COMPLETE CBC W/AUTO DIFF WBC: CPT

## 2018-08-20 PROCEDURE — 36415 COLL VENOUS BLD VENIPUNCTURE: CPT

## 2018-08-20 RX ORDER — FEXOFENADINE HYDROCHLORIDE 60 MG/1
60 TABLET, FILM COATED ORAL AS NEEDED
COMMUNITY

## 2018-08-20 RX ORDER — ACETAMINOPHEN 160 MG
TABLET,DISINTEGRATING ORAL DAILY
COMMUNITY
Start: 2016-09-13

## 2018-08-20 RX ORDER — ALBUTEROL SULFATE 90 UG/1
1-2 AEROSOL, METERED RESPIRATORY (INHALATION)
COMMUNITY
Start: 2015-09-01 | End: 2019-02-07

## 2018-08-20 RX ORDER — PROPRANOLOL HYDROCHLORIDE 10 MG/1
1 TABLET ORAL 2 TIMES DAILY PRN
COMMUNITY

## 2018-08-20 RX ORDER — FLUTICASONE PROPIONATE 50 MCG
2 SPRAY, SUSPENSION (ML) NASAL AS NEEDED
COMMUNITY

## 2018-08-20 NOTE — PROGRESS NOTES
Assessment/Plan:    Patient Instructions    Left flank pain is of unclear etiology -my suspicion is that this is musculoskeletal however will check blood work to assess white blood cell count to rule out evidence of infection that could be diverticulitis or other bowel inflammation versus urinary tract, also check urinalysis with reflex culture, follow-up results accordingly  In the interim continue to treat conservatively with over-the-counter analgesics such as Tylenol and heating pad as needed  Contact me for worsening pain, fever or new or alarming symptoms    Complete blood work as ordered and follow for yearly physical    Hypertension with history of white coat hypertension -follow home blood pressures periodically prior to your physical          Diagnoses and all orders for this visit:    Left flank pain  -     CBC and differential; Future  -     Comprehensive metabolic panel; Future  -     UA w Reflex to Microscopic w Reflex to Culture    Essential hypertension  -     Lipid panel; Future    Other orders  -     fexofenadine (ALLEGRA ALLERGY) 60 MG tablet; Take by mouth  -     fluticasone (FLONASE) 50 mcg/act nasal spray; 2 sprays into each nostril daily  -     propranolol (INDERAL) 10 mg tablet; Take 1 tablet by mouth 2 (two) times a day as needed  -     albuterol (VENTOLIN HFA) 90 mcg/act inhaler; Inhale 1-2 puffs  -     Cholecalciferol (VITAMIN D3) 2000 units capsule; Take by mouth daily         Subjective:      Patient ID: Michael Rolon is a 50 y o  female    Patient presents for evaluation of left flank pain  Pain came on  4 days ago, she woke up feeling generally well and then as the morning progressed she developed a pressure like discomfort in the left flank around the side and into the low back  She tried to push fluids and has noticed some increased frequency of urination since that time without urgency, frequency or hematuria    She has been somewhat anxious about this pain which has led to some looser than normal bowels but she has not had any blood or mucus in the stool  She has not had any fevers or chills  She has some nausea when she gets anxious about the discomfort but she has not vomited  She denies any trauma to the low back or flank area and there has been no heavy lifting, twisting, bending or overuse type activities to account for musculoskeletal pain  Over the last 4 days the discomfort has gradually improved and now it is about 50% better but it persists and she is unclear of the etiology and so presents for my evaluation  She does not recall ever having had similar discomfort  She has never had a colonoscopy  Current Outpatient Prescriptions:     ADVAIR DISKUS 100-50 MCG/DOSE inhaler, INHALE 1 PUFF BY MOUTH TWICE DAILY, Disp: 1 Inhaler, Rfl: 0    albuterol (VENTOLIN HFA) 90 mcg/act inhaler, Inhale 1-2 puffs, Disp: , Rfl:     Cholecalciferol (VITAMIN D3) 2000 units capsule, Take by mouth daily, Disp: , Rfl:     fexofenadine (ALLEGRA ALLERGY) 60 MG tablet, Take by mouth, Disp: , Rfl:     fluticasone (FLONASE) 50 mcg/act nasal spray, 2 sprays into each nostril daily, Disp: , Rfl:     ipratropium-albuterol (DUO-NEB) 0 5-2 5 mg/3 mL nebulizer solution, Take 1 vial (3 mL total) by nebulization every 4 (four) hours as needed for shortness of breath, Disp: 120 vial, Rfl: 3    propranolol (INDERAL) 10 mg tablet, Take 1 tablet by mouth 2 (two) times a day as needed, Disp: , Rfl:     No results found for this or any previous visit (from the past 1008 hour(s))  The following portions of the patient's history were reviewed and updated as appropriate: allergies, current medications, past family history, past medical history, past social history, past surgical history and problem list      Review of Systems   Constitutional: Negative for appetite change, chills, diaphoresis, fatigue, fever and unexpected weight change     HENT: Negative for congestion, hearing loss and rhinorrhea  Eyes: Negative for visual disturbance  Respiratory: Negative for cough, chest tightness, shortness of breath and wheezing  Cardiovascular: Negative for chest pain, palpitations and leg swelling  Gastrointestinal: Negative for abdominal pain and blood in stool  Endocrine: Negative for cold intolerance, heat intolerance, polydipsia and polyuria  Genitourinary: Negative for difficulty urinating, dysuria, frequency and urgency  Musculoskeletal: Positive for back pain  Negative for arthralgias and myalgias  Skin: Negative for rash  Neurological: Negative for dizziness, weakness, light-headedness and headaches  Hematological: Does not bruise/bleed easily  Psychiatric/Behavioral: Negative for dysphoric mood and sleep disturbance  Objective:      Vitals:    08/20/18 1012   BP: 146/90   Pulse: 84   Resp: 12          Physical Exam   Constitutional: She is oriented to person, place, and time  She appears well-developed and well-nourished  HENT:   Head: Normocephalic and atraumatic  Nose: Nose normal    Mouth/Throat: Oropharynx is clear and moist    Eyes: Conjunctivae and EOM are normal  Pupils are equal, round, and reactive to light  No scleral icterus  Neck: Normal range of motion  Neck supple  No JVD present  No tracheal deviation present  No thyromegaly present  Cardiovascular: Normal rate, regular rhythm and intact distal pulses  Exam reveals no gallop and no friction rub  No murmur heard  Pulmonary/Chest: Effort normal and breath sounds normal  No respiratory distress  She has no wheezes  She has no rales  Abdominal: Soft  Bowel sounds are normal  She exhibits no distension and no mass  There is no tenderness  There is no rebound and no guarding  Musculoskeletal: She exhibits no edema or tenderness     Low back is normal on inspection, no paraspinal muscle tenderness or spasm, no erythema, no bony deformity, no SI joint tenderness, no significant tenderness or mass in the area of perceived pain in the left lateral flank   Lymphadenopathy:     She has no cervical adenopathy  Neurological: She is alert and oriented to person, place, and time  No cranial nerve deficit  Skin: Skin is warm and dry  No rash noted  No erythema  Psychiatric: She has a normal mood and affect   Her behavior is normal  Judgment and thought content normal

## 2018-08-20 NOTE — PATIENT INSTRUCTIONS
Left flank pain is of unclear etiology -my suspicion is that this is musculoskeletal however will check blood work to assess white blood cell count to rule out evidence of infection that could be diverticulitis or other bowel inflammation versus urinary tract, also check urinalysis with reflex culture, follow-up results accordingly  In the interim continue to treat conservatively with over-the-counter analgesics such as Tylenol and heating pad as needed    Contact me for worsening pain, fever or new or alarming symptoms    Complete blood work as ordered and follow for yearly physical    Hypertension with history of white coat hypertension -follow home blood pressures periodically prior to your physical

## 2018-08-20 NOTE — TELEPHONE ENCOUNTER
----- Message from Ceferino Bob MD sent at 8/20/2018  1:27 PM EDT -----  Notify-no evidence for infection, monitor blood pressures and will see her in follow-up in the next 4-8 weeks

## 2018-09-15 ENCOUNTER — APPOINTMENT (OUTPATIENT)
Dept: LAB | Facility: HOSPITAL | Age: 48
End: 2018-09-15
Attending: INTERNAL MEDICINE
Payer: COMMERCIAL

## 2018-09-15 ENCOUNTER — TRANSCRIBE ORDERS (OUTPATIENT)
Dept: ADMINISTRATIVE | Facility: HOSPITAL | Age: 48
End: 2018-09-15

## 2018-09-15 DIAGNOSIS — I10 ESSENTIAL HYPERTENSION: ICD-10-CM

## 2018-09-15 DIAGNOSIS — I10 ESSENTIAL HYPERTENSION: Primary | ICD-10-CM

## 2018-09-15 LAB
CHOLEST SERPL-MCNC: 180 MG/DL (ref 50–200)
HDLC SERPL-MCNC: 74 MG/DL (ref 40–60)
LDLC SERPL CALC-MCNC: 93 MG/DL (ref 0–100)
NONHDLC SERPL-MCNC: 106 MG/DL
TRIGL SERPL-MCNC: 67 MG/DL

## 2018-09-15 PROCEDURE — 36415 COLL VENOUS BLD VENIPUNCTURE: CPT

## 2018-09-15 PROCEDURE — 80061 LIPID PANEL: CPT

## 2018-09-19 ENCOUNTER — OFFICE VISIT (OUTPATIENT)
Dept: INTERNAL MEDICINE CLINIC | Facility: CLINIC | Age: 48
End: 2018-09-19
Payer: COMMERCIAL

## 2018-09-19 VITALS
SYSTOLIC BLOOD PRESSURE: 136 MMHG | HEART RATE: 79 BPM | WEIGHT: 170.6 LBS | BODY MASS INDEX: 28.39 KG/M2 | DIASTOLIC BLOOD PRESSURE: 86 MMHG

## 2018-09-19 DIAGNOSIS — I10 ESSENTIAL HYPERTENSION: ICD-10-CM

## 2018-09-19 DIAGNOSIS — Z00.00 WELL ADULT EXAM: Primary | ICD-10-CM

## 2018-09-19 DIAGNOSIS — Z12.11 SCREENING FOR COLON CANCER: ICD-10-CM

## 2018-09-19 DIAGNOSIS — J45.20 MILD INTERMITTENT ASTHMA, UNSPECIFIED WHETHER COMPLICATED: ICD-10-CM

## 2018-09-19 DIAGNOSIS — F41.9 ANXIETY: ICD-10-CM

## 2018-09-19 PROCEDURE — 99396 PREV VISIT EST AGE 40-64: CPT | Performed by: INTERNAL MEDICINE

## 2018-09-19 NOTE — PATIENT INSTRUCTIONS
Lab data reviewed and compared prior    White coat hypertension-home blood pressures are all acceptable but elevated in the office, repeat by me 158/100,  163/82 on her monitor, we discussed dietary modification, consider the dash diet as well as goal aerobic exercise 30 min per day, 5 days per week and weight loss efforts  Asthma and Allergy stable on present regimen    Anxiety -stable with propranolol as needed for public speaking    Health maintenance -will be due for screening colonoscopy, referral to GI, up-to-date with Paps and mammogram under care of Gynecology, flu shot to be administered later this fall     routine follow-up in 6 months, lab follow-up in 1 year

## 2018-09-19 NOTE — PROGRESS NOTES
Assessment/Plan:    Patient Instructions   Lab data reviewed and compared prior    White coat hypertension-home blood pressures are all acceptable but elevated in the office, repeat by me 158/100,  163/82 on her monitor, we discussed dietary modification, consider the dash diet as well as goal aerobic exercise 30 min per day, 5 days per week and weight loss efforts  Asthma and Allergy stable on present regimen    Anxiety -stable with propranolol as needed for public speaking    Health maintenance -will be due for screening colonoscopy, referral to GI, up-to-date with Paps and mammogram under care of Gynecology, flu shot to be administered later this fall     routine follow-up in 6 months, lab follow-up in 1 year  Diagnoses and all orders for this visit:    Well adult exam    Essential hypertension    Mild intermittent asthma, unspecified whether complicated    Anxiety    Screening for colon cancer  -     Ambulatory referral to Gastroenterology; Future         Subjective:      Patient ID: Jean Claude Shickshinny is a 50 y o  female    Feeling well  HTN-home bp's 90% <130/80  Back to exercising elliptical in am, walking 20 min at lunch  Admits to loving home made ice cream  Propranolol only for presentations every few mos     Allergy and asthma stable using proair for colds and heavy perfume rxn etc            Current Outpatient Prescriptions:     albuterol (VENTOLIN HFA) 90 mcg/act inhaler, Inhale 1-2 puffs, Disp: , Rfl:     Cholecalciferol (VITAMIN D3) 2000 units capsule, Take by mouth daily, Disp: , Rfl:     fexofenadine (ALLEGRA ALLERGY) 60 MG tablet, Take by mouth, Disp: , Rfl:     fluticasone (FLONASE) 50 mcg/act nasal spray, 2 sprays into each nostril daily, Disp: , Rfl:     ipratropium-albuterol (DUO-NEB) 0 5-2 5 mg/3 mL nebulizer solution, Take 1 vial (3 mL total) by nebulization every 4 (four) hours as needed for shortness of breath, Disp: 120 vial, Rfl: 3    propranolol (INDERAL) 10 mg tablet, Take 1 tablet by mouth 2 (two) times a day as needed, Disp: , Rfl:     Recent Results (from the past 1008 hour(s))   UA w Reflex to Microscopic w Reflex to Culture    Collection Time: 08/20/18 12:10 PM   Result Value Ref Range    Color, UA Yellow     Clarity, UA Clear     Specific Gravity, UA <=1 005 1 003 - 1 030    pH, UA 7 5 4 5 - 8 0    Leukocytes, UA Negative Negative    Nitrite, UA Negative Negative    Protein, UA Negative Negative mg/dl    Glucose, UA Negative Negative mg/dl    Ketones, UA Negative Negative mg/dl    Urobilinogen, UA 0 2 0 2, 1 0 E U /dl E U /dl    Bilirubin, UA Negative Negative    Blood, UA Small (A) Negative   CBC and differential    Collection Time: 08/20/18 12:10 PM   Result Value Ref Range    WBC 7 60 4 31 - 10 16 Thousand/uL    RBC 4 29 3 81 - 5 12 Million/uL    Hemoglobin 12 7 11 5 - 15 4 g/dL    Hematocrit 39 2 34 8 - 46 1 %    MCV 91 82 - 98 fL    MCH 29 6 26 8 - 34 3 pg    MCHC 32 4 31 4 - 37 4 g/dL    RDW 14 0 11 6 - 15 1 %    MPV 10 3 8 9 - 12 7 fL    Platelets 680 508 - 080 Thousands/uL    nRBC 0 /100 WBCs    Neutrophils Relative 75 43 - 75 %    Immat GRANS % 0 0 - 2 %    Lymphocytes Relative 20 14 - 44 %    Monocytes Relative 5 4 - 12 %    Eosinophils Relative 0 0 - 6 %    Basophils Relative 0 0 - 1 %    Neutrophils Absolute 5 64 1 85 - 7 62 Thousands/µL    Immature Grans Absolute 0 03 0 00 - 0 20 Thousand/uL    Lymphocytes Absolute 1 53 0 60 - 4 47 Thousands/µL    Monocytes Absolute 0 34 0 17 - 1 22 Thousand/µL    Eosinophils Absolute 0 03 0 00 - 0 61 Thousand/µL    Basophils Absolute 0 03 0 00 - 0 10 Thousands/µL   Comprehensive metabolic panel    Collection Time: 08/20/18 12:10 PM   Result Value Ref Range    Sodium 139 136 - 145 mmol/L    Potassium 3 6 3 5 - 5 3 mmol/L    Chloride 102 100 - 108 mmol/L    CO2 29 21 - 32 mmol/L    ANION GAP 8 4 - 13 mmol/L    BUN 13 5 - 25 mg/dL    Creatinine 0 78 0 60 - 1 30 mg/dL    Glucose 97 65 - 140 mg/dL    Calcium 8 7 8 3 - 10 1 mg/dL AST 15 5 - 45 U/L    ALT 26 12 - 78 U/L    Alkaline Phosphatase 83 46 - 116 U/L    Total Protein 7 6 6 4 - 8 2 g/dL    Albumin 3 8 3 5 - 5 0 g/dL    Total Bilirubin 0 50 0 20 - 1 00 mg/dL    eGFR 90 ml/min/1 73sq m   Urine Microscopic    Collection Time: 08/20/18 12:10 PM   Result Value Ref Range    RBC, UA 0-1 (A) None Seen, 0-5 /hpf    WBC, UA None Seen None Seen, 0-5, 5-55, 5-65 /hpf    Epithelial Cells Occasional None Seen, Occasional /hpf    Bacteria, UA None Seen None Seen, Occasional /hpf   Lipid panel    Collection Time: 09/15/18  7:22 AM   Result Value Ref Range    Cholesterol 180 50 - 200 mg/dL    Triglycerides 67 <=150 mg/dL    HDL, Direct 74 (H) 40 - 60 mg/dL    LDL Calculated 93 0 - 100 mg/dL    Non-HDL-Chol (CHOL-HDL) 106 mg/dl       The following portions of the patient's history were reviewed and updated as appropriate: allergies, current medications, past family history, past medical history, past social history, past surgical history and problem list      Review of Systems      Objective:      Vitals:    09/19/18 1658   BP: 136/86   Pulse: 79          Physical Exam   Constitutional: She is oriented to person, place, and time  She appears well-developed and well-nourished  HENT:   Head: Normocephalic and atraumatic  Nose: Nose normal    Mouth/Throat: Oropharynx is clear and moist    Eyes: Conjunctivae and EOM are normal  Pupils are equal, round, and reactive to light  No scleral icterus  Neck: Normal range of motion  Neck supple  No JVD present  No tracheal deviation present  No thyromegaly present  Cardiovascular: Normal rate, regular rhythm and intact distal pulses  Exam reveals no gallop and no friction rub  No murmur heard  Pulmonary/Chest: Effort normal and breath sounds normal  No respiratory distress  She has no wheezes  She has no rales  Abdominal: Soft  Bowel sounds are normal  She exhibits no distension and no mass  There is no tenderness   There is no rebound and no guarding  Musculoskeletal: She exhibits no edema or tenderness  Lymphadenopathy:     She has no cervical adenopathy  Neurological: She is alert and oriented to person, place, and time  No cranial nerve deficit  Skin: Skin is warm and dry  No rash noted  No erythema  Psychiatric: She has a normal mood and affect   Her behavior is normal  Judgment and thought content normal

## 2018-10-19 ENCOUNTER — TELEPHONE (OUTPATIENT)
Dept: GASTROENTEROLOGY | Facility: CLINIC | Age: 48
End: 2018-10-19

## 2018-12-21 ENCOUNTER — TELEPHONE (OUTPATIENT)
Dept: GASTROENTEROLOGY | Facility: CLINIC | Age: 48
End: 2018-12-21

## 2018-12-27 PROBLEM — Z12.11 SPECIAL SCREENING FOR MALIGNANT NEOPLASMS, COLON: Status: ACTIVE | Noted: 2018-12-27

## 2018-12-27 NOTE — TELEPHONE ENCOUNTER
12/27/18  Screened by: Dulce Farmer    Referring Provider     Pre- Screening: There is no height or weight on file to calculate BMI  Has patient been referred for a routine screening Colonoscopy? yes  Is the patient between 39-70 years old? yes    SCHEDULING STAFF   If the patient is between 45yrs-49yrs, please advise patient to confirm benefits/coverage with their insurance company for a routine screening colonoscopy, some insurance carriers will only cover at [address] or older   If the patient is over 66years old, please schedule an office visit  Do you have any of the following symptoms? Constipation,uses miralax to help    Have you had a coronary or vascular stent within the last year? no    Have you had a heart attack or stroke in the last 6 months? no    Have you had intestinal surgery in the last 3 months? no    Do you have problems with: none    Do you use: none    Have you been hospitalized in the last Month? no    Have you been diagnosed with a bleeding disorder or anemia? no    Have you had chest pain (angina) or breathing problems  (COPD) in the last 3 months? no    Do you have any difficulty walking up a flight of stairs? no    Have you had Kidney failure or insufficiency? no    Have you had heart valve surgery? no    Are you confined to a wheelchair? no    Do you take Other blood thinning medications no    Have you been diagnosed with Diabetes or are you taking any   Diabetic medications? no    : If patient answers NO to medical questions, then schedule procedure  If patient answers YES to medical questions, then schedule office appointment  Previous Colonoscopy no  Date and Facility of last colonoscopy?      Comments: 01/09/19

## 2019-01-04 ENCOUNTER — TELEPHONE (OUTPATIENT)
Dept: INTERNAL MEDICINE CLINIC | Facility: CLINIC | Age: 49
End: 2019-01-04

## 2019-01-04 NOTE — TELEPHONE ENCOUNTER
Called patient because we need to know her waist circumference and also notify her that she did not have an A1C done within the dates stated on the form so even if dr Hogan Picking signs it it will be incomplete

## 2019-01-04 NOTE — TELEPHONE ENCOUNTER
[address] [place] [address] INQUIRING ABOUT HER WELLNESS BENEFITS FORM THAT SHE FAX3ED OVER ON 12/28   SHE FILLED OUT THE TOP AND NEEDS DR Nain Jackman TO FILL OUT THE BOTTOM AND FAX BACK TO    [phone]    PLEASE CONTACT [id number] [facility] WHEN FORM IS COMPLETED AND FAXED  [phone]

## 2019-01-08 NOTE — TELEPHONE ENCOUNTER
CALLED PT  WAS NOTIFIED AND WILL STILL FAX REQUEST IN AFTER JEET TRAYLOR PUT IN 'S BOX FOR SIGNATURE

## 2019-01-09 ENCOUNTER — ANESTHESIA EVENT (OUTPATIENT)
Dept: PERIOP | Facility: HOSPITAL | Age: 49
End: 2019-01-09
Payer: COMMERCIAL

## 2019-01-10 ENCOUNTER — ANESTHESIA (OUTPATIENT)
Dept: PERIOP | Facility: HOSPITAL | Age: 49
End: 2019-01-10
Payer: COMMERCIAL

## 2019-01-10 ENCOUNTER — HOSPITAL ENCOUNTER (OUTPATIENT)
Facility: HOSPITAL | Age: 49
Setting detail: OUTPATIENT SURGERY
Discharge: HOME | End: 2019-01-10
Attending: INTERNAL MEDICINE | Admitting: INTERNAL MEDICINE
Payer: COMMERCIAL

## 2019-01-10 VITALS
SYSTOLIC BLOOD PRESSURE: 135 MMHG | RESPIRATION RATE: 18 BRPM | HEART RATE: 70 BPM | HEIGHT: 65 IN | OXYGEN SATURATION: 98 % | BODY MASS INDEX: 28.5 KG/M2 | WEIGHT: 171.08 LBS | DIASTOLIC BLOOD PRESSURE: 70 MMHG | TEMPERATURE: 97.1 F

## 2019-01-10 DIAGNOSIS — Z12.11 SPECIAL SCREENING FOR MALIGNANT NEOPLASMS, COLON: ICD-10-CM

## 2019-01-10 LAB
EXT PREGNANCY TEST URINE: NEGATIVE
GLUCOSE SERPL-MCNC: 93 MG/DL (ref 65–140)

## 2019-01-10 PROCEDURE — 81025 URINE PREGNANCY TEST: CPT | Performed by: STUDENT IN AN ORGANIZED HEALTH CARE EDUCATION/TRAINING PROGRAM

## 2019-01-10 PROCEDURE — 88305 TISSUE EXAM BY PATHOLOGIST: CPT | Performed by: PATHOLOGY

## 2019-01-10 PROCEDURE — 45384 COLONOSCOPY W/LESION REMOVAL: CPT | Performed by: INTERNAL MEDICINE

## 2019-01-10 PROCEDURE — 82948 REAGENT STRIP/BLOOD GLUCOSE: CPT

## 2019-01-10 RX ORDER — PROPOFOL 10 MG/ML
INJECTION, EMULSION INTRAVENOUS AS NEEDED
Status: DISCONTINUED | OUTPATIENT
Start: 2019-01-10 | End: 2019-01-10 | Stop reason: SURG

## 2019-01-10 RX ORDER — SODIUM CHLORIDE, SODIUM LACTATE, POTASSIUM CHLORIDE, CALCIUM CHLORIDE 600; 310; 30; 20 MG/100ML; MG/100ML; MG/100ML; MG/100ML
INJECTION, SOLUTION INTRAVENOUS CONTINUOUS PRN
Status: DISCONTINUED | OUTPATIENT
Start: 2019-01-10 | End: 2019-01-10 | Stop reason: SURG

## 2019-01-10 RX ADMIN — PROPOFOL 50 MG: 10 INJECTION, EMULSION INTRAVENOUS at 07:36

## 2019-01-10 RX ADMIN — PROPOFOL 40 MG: 10 INJECTION, EMULSION INTRAVENOUS at 07:34

## 2019-01-10 RX ADMIN — PROPOFOL 110 MG: 10 INJECTION, EMULSION INTRAVENOUS at 07:32

## 2019-01-10 RX ADMIN — PROPOFOL 50 MG: 10 INJECTION, EMULSION INTRAVENOUS at 07:38

## 2019-01-10 RX ADMIN — SODIUM CHLORIDE, SODIUM LACTATE, POTASSIUM CHLORIDE, AND CALCIUM CHLORIDE: .6; .31; .03; .02 INJECTION, SOLUTION INTRAVENOUS at 07:10

## 2019-01-10 NOTE — OP NOTE
Postoperative Note  PATIENT NAME: Helio Jamse  : 1970  MRN: [id number]  [facility] 01    Surgery Date: 1/10/2019    POST-OP DIAGNOSIS: See the impression below    SEDATION: Monitored anesthesia care, check anesthesia records    PHYSICAL EXAM:  Vitals:    01/10/19 0650   BP: 135/76   Pulse: 74   Resp: 16   Temp: 97 5 °F (36 4 °C)   SpO2: 94%     Body mass index is 28 47 kg/m²  General: NAD  Heart: S1 & S2 normal, RRR  Lungs: CTA, No rales or rhonchi  Abdomen: Soft, nontender, nondistended, good bowel sounds    CONSENT:  Informed consent was obtained for the procedure, including sedation after explaining the risks and benefits of the procedure  Risks including but not limited to bleeding, perforation, infection, aspiration were discussed in detail  Also explained about less than 100%$ sensitivity with the exam and other alternatives  DESCRIPTION:   Procedure fiberoptic colonoscopy polypectomy with hot biopsy forcep    Indications:  61-year-old female for initial screening colonoscopy  No prior history of colonoscopy    Estimated blood loss:  None     ASA 2 None    Premedicated with mac anesthesia    Patient is identified by me  She is examined prior to the procedure and found to be in stable condition  She is attached the cardiac monitor and pulse oximeter and placed in the left lateral position  After adequate intravenous sedation the Olympus video colonoscope was inserted and passed easily to the cecum  The ileocecal valve and the appendiceal orifice are identified and the scope was slowly withdrawn with excellent circumferential visualization of the mucosa  Preparation is excellent  In the rectosigmoid there is a single diminutive polyp removed with hot biopsy technique with excellent len  The polyp was retrieved and sent to pathology  In the rectum there is a diminutive polyp removed with hot biopsy technique with excellent len  The polyp was retrieved and sent to pathology    No other inflammatory neoplastic or vascular lesions are noted  Retroversion is normal   She tolerated the procedure well and was stable throughout  My impression:  Diminutive rectal and rectosigmoid polyps, status post hot biopsy polypectomy    RECOMMENDATIONS:  Follow up biopsy results in 1 week  Follow-up colonoscopy in 5 years    COMPLICATIONS:  None; patient tolerated the procedure well  DISPOSITION: PACU  CONDITION: Stable    Dana Arora MD  1/10/2019,7:41 AM        Portions of the record may have been created with voice recognition software   Occasional wrong word or "sound a like" substitutions may have occurred due to the inherent limitations of voice recognition software   Read the chart carefully and recognize, using context, where substitutions have occurred

## 2019-01-10 NOTE — DISCHARGE INSTR - AVS FIRST PAGE
Postoperative Note  PATIENT NAME: Malou Skinner  : 1970  MRN: [id number]  [facility]    Surgery Date: 1/10/2019    POST-OP DIAGNOSIS: See the impression below    SEDATION: Monitored anesthesia care, check anesthesia records    PHYSICAL EXAM:  Vitals:    01/10/19 0650   BP: 135/76   Pulse: 74   Resp: 16   Temp: 97 5 °F (36 4 °C)   SpO2: 94%     Body mass index is 28 47 kg/m²  General: NAD  Heart: S1 & S2 normal, RRR  Lungs: CTA, No rales or rhonchi  Abdomen: Soft, nontender, nondistended, good bowel sounds    CONSENT:  Informed consent was obtained for the procedure, including sedation after explaining the risks and benefits of the procedure  Risks including but not limited to bleeding, perforation, infection, aspiration were discussed in detail  Also explained about less than 100%$ sensitivity with the exam and other alternatives  DESCRIPTION:   Procedure fiberoptic colonoscopy polypectomy with hot biopsy forcep    Indications:  75-year-old female for initial screening colonoscopy  No prior history of colonoscopy    Estimated blood loss:  None     ASA 2 None    Premedicated with mac anesthesia    Patient is identified by me  She is examined prior to the procedure and found to be in stable condition  She is attached the cardiac monitor and pulse oximeter and placed in the left lateral position  After adequate intravenous sedation the Olympus video colonoscope was inserted and passed easily to the cecum  The ileocecal valve and the appendiceal orifice are identified and the scope was slowly withdrawn with excellent circumferential visualization of the mucosa  Preparation is excellent  In the rectosigmoid there is a single diminutive polyp removed with hot biopsy technique with excellent len  The polyp was retrieved and sent to pathology  In the rectum there is a diminutive polyp removed with hot biopsy technique with excellent len  The polyp was retrieved and sent to pathology    No other inflammatory neoplastic or vascular lesions are noted  Retroversion is normal   She tolerated the procedure well and was stable throughout  My impression:  Diminutive rectal and rectosigmoid polyps, status post hot biopsy polypectomy    RECOMMENDATIONS:  Follow up biopsy results in 1 week  Follow-up colonoscopy in 5 years    COMPLICATIONS:  None; patient tolerated the procedure well  DISPOSITION: PACU  CONDITION: Stable    Maria Elena Rainey MD  1/10/2019,7:41 AM        Portions of the record may have been created with voice recognition software   Occasional wrong word or "sound a like" substitutions may have occurred due to the inherent limitations of voice recognition software   Read the chart carefully and recognize, using context, where substitutions have occurred

## 2019-01-10 NOTE — DISCHARGE INSTRUCTIONS
Colonoscopy   WHAT YOU NEED TO KNOW:   A colonoscopy is a procedure to examine the inside of your colon (intestine) with a scope  Polyps or tissue growths may have been removed during your colonoscopy  It is normal to feel bloated and to have some abdominal discomfort  You should be passing gas  If you have hemorrhoids or you had polyps removed, you may have a small amount of bleeding  DISCHARGE INSTRUCTIONS:   Seek care immediately if:   · You have a large amount of bright red blood in your bowel movements  · Your abdomen is hard and firm and you have severe pain  · You have sudden trouble breathing  Contact your healthcare provider if:   · You develop a rash or hives  · You have a fever within 24 hours of your procedure       · You have not had a bowel movement for 3 days after your procedure  · You have questions or concerns about your condition or care  Activity:   · Do not lift, strain, or run  for 3 days after your procedure  · Rest after your procedure  You have been given medicine to relax you  Do not  drive or make important decisions until the day after your procedure  Return to your normal activity as directed  · Relieve gas and discomfort from bloating  by lying on your right side with a heating pad on your abdomen  You may need to take short walks to help the gas move out  Eat small meals until bloating is relieved  If you had polyps removed: For 7 days after your procedure:  · Do not  take aspirin  · Do not  go on long car rides  Follow up with your healthcare provider as directed:  Write down your questions so you remember to ask them during your visits  © 2017 [address] [employer] is for End User's use only and may not be sold, redistributed or otherwise used for commercial purposes  All illustrations and images included in CareNotes® are the copyrighted property of [employer]  or Raymon Ryan    The above information is an  only  It is not intended as medical advice for individual conditions or treatments  Talk to your doctor, nurse or pharmacist before following any medical regimen to see if it is safe and effective for you

## 2019-01-10 NOTE — ANESTHESIA PREPROCEDURE EVALUATION
Review of Systems/Medical History  Patient summary reviewed  Chart reviewed  No history of anesthetic complications     Cardiovascular  Negative cardio ROS    Pulmonary  Asthma , PRN med  controlled ,   Comment: Uses inhaler prn with URI or exercise, last used 6 weeks ago     GI/Hepatic    GERD (TUMS) well controlled, Bowel prep       Negative  ROS        Endo/Other    Comment: Hx of hypoglycemia    GYN       Hematology  Negative hematology ROS      Musculoskeletal  Negative musculoskeletal ROS        Neurology  Seizures (as a baby) ,     Psychology   Anxiety,              Physical Exam    Airway    Mallampati score: II  TM Distance: >3 FB  Neck ROM: full     Dental   No notable dental hx     Cardiovascular  Comment: Negative ROS,     Pulmonary      Other Findings        Anesthesia Plan  ASA Score- 2     Anesthesia Type- IV sedation with anesthesia with ASA Monitors  Additional Monitors:   Airway Plan:         Plan Factors-    Induction- intravenous  Postoperative Plan-     Informed Consent- Anesthetic plan and risks discussed with patient

## 2019-01-10 NOTE — ANESTHESIA POSTPROCEDURE EVALUATION
Post-Op Assessment Note      CV Status:  Stable    Hydration Status:  Stable    PONV Controlled:  None    Airway Patency:  Patent    Post Op Vitals Reviewed: Yes          Staff: CRNA           BP   142/77   Temp      Pulse  76   Resp   15   SpO2   97% on RA   Post procedure VS noted above, SV non obstructed

## 2019-01-21 ENCOUNTER — TELEPHONE (OUTPATIENT)
Dept: INTERNAL MEDICINE CLINIC | Facility: CLINIC | Age: 49
End: 2019-01-21

## 2019-01-21 NOTE — TELEPHONE ENCOUNTER
PT  ALFIE   HAD  SHOULDER  PAIN   WENT  TO PHY  THERAPY   BUT  NOW  SHE  IS  HAVING  SHOULDER  PAIN  AGAIN     DR Daphne Muhammad  SAID  THE  NEXT  STEP  WOULD  BE  TO HAVE  A  MRI  DONE IF  HER  PAIN  CAME  BACK    WANTS  TO KNOW  IF  SHE  CAN  GET  AN  ORDER  FOR   MRI  NOW     CALL PT  [phone]

## 2019-01-22 ENCOUNTER — HOSPITAL ENCOUNTER (OUTPATIENT)
Dept: RADIOLOGY | Facility: HOSPITAL | Age: 49
Discharge: HOME | End: 2019-01-22
Payer: COMMERCIAL

## 2019-01-22 ENCOUNTER — OFFICE VISIT (OUTPATIENT)
Dept: INTERNAL MEDICINE CLINIC | Facility: CLINIC | Age: 49
End: 2019-01-22
Payer: COMMERCIAL

## 2019-01-22 VITALS
SYSTOLIC BLOOD PRESSURE: 150 MMHG | RESPIRATION RATE: 16 BRPM | WEIGHT: 173 LBS | HEIGHT: 65 IN | BODY MASS INDEX: 28.82 KG/M2 | DIASTOLIC BLOOD PRESSURE: 90 MMHG | HEART RATE: 80 BPM

## 2019-01-22 DIAGNOSIS — F41.9 ANXIETY: ICD-10-CM

## 2019-01-22 DIAGNOSIS — I10 ESSENTIAL HYPERTENSION: ICD-10-CM

## 2019-01-22 DIAGNOSIS — M75.02 ADHESIVE CAPSULITIS OF LEFT SHOULDER: Primary | ICD-10-CM

## 2019-01-22 DIAGNOSIS — M75.02 ADHESIVE CAPSULITIS OF LEFT SHOULDER: ICD-10-CM

## 2019-01-22 DIAGNOSIS — J45.20 MILD INTERMITTENT ASTHMA, UNSPECIFIED WHETHER COMPLICATED: ICD-10-CM

## 2019-01-22 PROCEDURE — 99214 OFFICE O/P EST MOD 30 MIN: CPT | Performed by: PHYSICIAN ASSISTANT

## 2019-01-22 PROCEDURE — 73030 X-RAY EXAM OF SHOULDER: CPT

## 2019-01-22 RX ORDER — TRAMADOL HYDROCHLORIDE 50 MG/1
50 TABLET ORAL EVERY 6 HOURS PRN
Qty: 30 TABLET | Refills: 0 | Status: SHIPPED | OUTPATIENT
Start: 2019-01-22 | End: 2019-02-07

## 2019-01-22 RX ORDER — ALBUTEROL SULFATE 90 UG/1
1-2 AEROSOL, METERED RESPIRATORY (INHALATION)
COMMUNITY
Start: 2015-09-01 | End: 2019-05-13 | Stop reason: SDUPTHER

## 2019-01-22 NOTE — PROGRESS NOTES
Assessment/Plan:  She has a frozen shoulder on the left  She has a long history of tendinitis in that shoulder  She has an MRI and orthopedic visit scheduled for tomorrow will get an x-ray today  Diagnoses and all orders for this visit:    Adhesive capsulitis of left shoulder  -     XR shoulder 2+ vw left; Future  -     traMADol (ULTRAM) 50 mg tablet; Take 1 tablet (50 mg total) by mouth every 6 (six) hours as needed for moderate pain    Anxiety    Essential hypertension    Mild intermittent asthma, unspecified whether complicated    Other orders  -     albuterol (PROVENTIL HFA) 90 mcg/act inhaler; Inhale 1-2 puffs        No problem-specific Assessment & Plan notes found for this encounter  Subjective:      Patient ID: Arjun Alva is a 46 y o  female  She developed severe pain in her left shoulder 3 days ago  It has become worse she now has severe pain and throbbing when she tries to move her left shoulder at all she has a long history of pain and stiffness of the left shoulder  She recalls no specific injury she has had injections and physical therapy in the past which has helped previously      Shoulder Pain    Pertinent negatives include no fever or numbness  The following portions of the patient's history were reviewed and updated as appropriate:   She has a past medical history of Asthma; Hypoglycemia; Idiopathic angioedema; and Seizures ([facility] [address] )  ,   does not have any pertinent problems on file  ,   has a past surgical history that includes Wrist surgery and pr colonoscopy flx dx w/collj spec when pfrmd (N/A, 1/10/2019)  ,  family history includes Diabetes type II in her father and mother; Hypertension in her father and mother; Other in her mother  ,   reports that she quit smoking about 14 years ago  Her smoking use included Cigarettes  She smoked 0 25 packs per day  She has never used smokeless tobacco  She reports that she drinks about 3 6 oz of alcohol per week    She reports that she does not use drugs  ,  is allergic to penicillins; ciprofloxacin; dilantin [phenytoin]; phenobarbital; aspirin; cephalexin; and mephobarbital   Current Outpatient Prescriptions   Medication Sig Dispense Refill    albuterol (PROVENTIL HFA) 90 mcg/act inhaler Inhale 1-2 puffs      Cholecalciferol (VITAMIN D3) 2000 units capsule Take by mouth daily      fexofenadine (ALLEGRA ALLERGY) 60 MG tablet Take 60 mg by mouth as needed        fluticasone (FLONASE) 50 mcg/act nasal spray 2 sprays into each nostril as needed        ipratropium-albuterol (DUO-NEB) 0 5-2 5 mg/3 mL nebulizer solution Take 1 vial (3 mL total) by nebulization every 4 (four) hours as needed for shortness of breath 120 vial 3    propranolol (INDERAL) 10 mg tablet Take 1 tablet by mouth 2 (two) times a day as needed      albuterol (VENTOLIN HFA) 90 mcg/act inhaler Inhale 1-2 puffs      traMADol (ULTRAM) 50 mg tablet Take 1 tablet (50 mg total) by mouth every 6 (six) hours as needed for moderate pain 30 tablet 0     No current facility-administered medications for this visit  Review of Systems   Constitutional: Negative for activity change, appetite change, chills, diaphoresis, fatigue, fever and unexpected weight change  HENT: Negative for congestion, dental problem, drooling, ear discharge, ear pain, facial swelling, hearing loss, nosebleeds, postnasal drip, rhinorrhea, sinus pain, sinus pressure, sneezing, sore throat, tinnitus, trouble swallowing and voice change  Eyes: Negative for photophobia, pain, discharge, redness, itching and visual disturbance  Respiratory: Negative for apnea, cough, choking, chest tightness, shortness of breath, wheezing and stridor  Cardiovascular: Negative for chest pain, palpitations and leg swelling  Gastrointestinal: Negative for abdominal distention, abdominal pain, anal bleeding, blood in stool, constipation, diarrhea, nausea, rectal pain and vomiting     Endocrine: Negative for cold intolerance, heat intolerance, polydipsia, polyphagia and polyuria  Genitourinary: Negative for decreased urine volume, difficulty urinating, dysuria, enuresis, flank pain, frequency, genital sores, hematuria and urgency  Musculoskeletal: Positive for arthralgias  Negative for back pain, gait problem, joint swelling, myalgias, neck pain and neck stiffness  Skin: Negative for color change, pallor, rash and wound  Neurological: Negative for dizziness, tremors, seizures, syncope, facial asymmetry, speech difficulty, weakness, light-headedness, numbness and headaches  Hematological: Negative for adenopathy  Does not bruise/bleed easily  Psychiatric/Behavioral: Negative for agitation, behavioral problems, confusion, decreased concentration, dysphoric mood, hallucinations, self-injury, sleep disturbance and suicidal ideas  The patient is not nervous/anxious and is not hyperactive  Objective:  Vitals:    01/22/19 1043   BP: 150/90   BP Location: Right arm   Patient Position: Sitting   Pulse: 80   Resp: 16   Weight: 78 5 kg (173 lb)   Height: 5' 5" (1 651 m)     Body mass index is 28 79 kg/m²  Physical Exam   Constitutional: She is oriented to person, place, and time  She appears well-developed  HENT:   Head: Normocephalic  Right Ear: External ear normal    Left Ear: External ear normal    Mouth/Throat: Oropharynx is clear and moist    Eyes: Pupils are equal, round, and reactive to light  Conjunctivae are normal    Neck: Neck supple  No thyromegaly present  Cardiovascular: Normal rate, regular rhythm, normal heart sounds and intact distal pulses  Pulmonary/Chest: Effort normal and breath sounds normal    Abdominal: Soft  Bowel sounds are normal    Musculoskeletal: Normal range of motion  She exhibits tenderness (Severe pain with any movement of her left shoulder neurovascular status of her arm is fine)  She exhibits no edema or deformity     Neurological: She is alert and oriented to person, place, and time  She has normal reflexes  She displays normal reflexes  No cranial nerve deficit  She exhibits normal muscle tone  Coordination normal    Skin: Skin is warm and dry

## 2019-01-23 ENCOUNTER — OFFICE VISIT (OUTPATIENT)
Dept: OBGYN CLINIC | Facility: CLINIC | Age: 49
End: 2019-01-23
Payer: COMMERCIAL

## 2019-01-23 VITALS
WEIGHT: 175 LBS | BODY MASS INDEX: 29.16 KG/M2 | SYSTOLIC BLOOD PRESSURE: 144 MMHG | HEART RATE: 112 BPM | HEIGHT: 65 IN | DIASTOLIC BLOOD PRESSURE: 82 MMHG

## 2019-01-23 DIAGNOSIS — M75.22 BICEPS TENDINITIS OF LEFT SHOULDER: ICD-10-CM

## 2019-01-23 DIAGNOSIS — M75.52 SUBACROMIAL BURSITIS OF LEFT SHOULDER JOINT: Primary | ICD-10-CM

## 2019-01-23 DIAGNOSIS — M75.02 ADHESIVE CAPSULITIS OF LEFT SHOULDER: ICD-10-CM

## 2019-01-23 PROCEDURE — 99204 OFFICE O/P NEW MOD 45 MIN: CPT | Performed by: FAMILY MEDICINE

## 2019-01-23 PROCEDURE — 20610 DRAIN/INJ JOINT/BURSA W/O US: CPT | Performed by: FAMILY MEDICINE

## 2019-01-23 RX ORDER — LIDOCAINE HYDROCHLORIDE 10 MG/ML
4 INJECTION, SOLUTION INFILTRATION; PERINEURAL
Status: COMPLETED | OUTPATIENT
Start: 2019-01-23 | End: 2019-01-23

## 2019-01-23 RX ORDER — LIDOCAINE HYDROCHLORIDE 10 MG/ML
3 INJECTION, SOLUTION INFILTRATION; PERINEURAL
Status: COMPLETED | OUTPATIENT
Start: 2019-01-23 | End: 2019-01-23

## 2019-01-23 RX ORDER — PREDNISONE 20 MG/1
20 TABLET ORAL DAILY
Qty: 6 TABLET | Refills: 0 | Status: SHIPPED | OUTPATIENT
Start: 2019-01-23 | End: 2019-01-29

## 2019-01-23 RX ORDER — TRIAMCINOLONE ACETONIDE 40 MG/ML
40 INJECTION, SUSPENSION INTRA-ARTICULAR; INTRAMUSCULAR
Status: COMPLETED | OUTPATIENT
Start: 2019-01-23 | End: 2019-01-23

## 2019-01-23 RX ADMIN — LIDOCAINE HYDROCHLORIDE 4 ML: 10 INJECTION, SOLUTION INFILTRATION; PERINEURAL at 09:28

## 2019-01-23 RX ADMIN — TRIAMCINOLONE ACETONIDE 40 MG: 40 INJECTION, SUSPENSION INTRA-ARTICULAR; INTRAMUSCULAR at 09:28

## 2019-01-23 RX ADMIN — LIDOCAINE HYDROCHLORIDE 3 ML: 10 INJECTION, SOLUTION INFILTRATION; PERINEURAL at 09:28

## 2019-01-23 NOTE — LETTER
January 23, 2019     Jigar Vasquez MD  [address],  [place] [address]    Patient: Clarice Dunn   YOB: 1970   Date of Visit: 1/23/2019       Dear Dr Richard Rubio: Thank you for referring Clarice Dunn to me for evaluation  Below are my notes for this consultation  If you have questions, please do not hesitate to call me  I look forward to following your patient along with you  Sincerely,        [employer], DO        CC: No Recipients  [employer], DO  1/23/2019  1:10 PM  Sign at close encounter  Assessment/Plan:  Assessment/Plan   Diagnoses and all orders for this visit:    Subacromial bursitis of left shoulder joint  -     Large joint arthrocentesis  -     predniSONE 20 mg tablet; Take 1 tablet (20 mg total) by mouth daily for 6 days  -     Ambulatory referral to Physical Therapy; Future    Biceps tendinitis of left shoulder  -     predniSONE 20 mg tablet; Take 1 tablet (20 mg total) by mouth daily for 6 days  -     Ambulatory referral to Physical Therapy; Future    Adhesive capsulitis of left shoulder  -     Ambulatory referral to Physical Therapy; Future      28-year-old right-hand-dominant female with recurrent left shoulder pain and stiffness of more than 2 years duration  Discussed with patient physical exam, radiographs, impression and plan  X-rays noted for subacromial calcification  Physical exam noted for significant tenderness upon palpation of the lateral subacromial area as well as the biceps tendon and coracoid process  She has limited range of motion even passively with forward flexion to 10°, abduction to 10°, and internal rotation to the buttock  Clinical impression is subacromial bursitis, biceps tendinitis, and likely adhesive capsulitis  I discussed with patient that most of her pain is on more superficial aspect so I discussed treatment option of subacromial corticosteroid injection to which she agreed    I administered mixture of 4 cc 1% lidocaine 1 cc Kenalog to the subacromial space without complication  Subjective:   Patient ID: Malou Skinner is a 46 y o  female  Chief Complaint   Patient presents with    Left Shoulder - Pain       17-year-old right-hand-dominant female preserve evaluation of left shoulder pain of more than 2 years duration  She denies any trauma or inciting event  Pain described as gradual onset, localized mainly to the anterior aspect of shoulder, achy and throbbing, radiating distally along the anterior lateral aspect the upper arm, constant, fluctuating intensity, worse with move the arm, associated with limited range of motion, and improved with return to neutral position  She reports having had done physical therapy in the past which provided mild improvement in pain and range of motion  She discontinued therapy in the past due to developing headache  Since then she has been taking Tylenol and taking prednisone intermittently for pain  She reports that up until about 1 week ago pain was mild in intensity, but then pain suddenly worsened without inciting event  She has difficulty with performing tasks such as doing her hair and getting dressed  Shoulder Pain   This is a chronic problem  The current episode started more than 1 year ago  The problem occurs constantly  The problem has been gradually worsening  Associated symptoms include arthralgias  Pertinent negatives include no abdominal pain, chest pain, chills, fever, joint swelling, numbness, rash, sore throat or weakness  Exacerbated by: Arm movement  She has tried rest and acetaminophen (Physical therapy) for the symptoms  The treatment provided mild relief  The following portions of the patient's history were reviewed and updated as appropriate: She  has a past medical history of Asthma; Hypoglycemia; Idiopathic angioedema; and Seizures ([address] )    She  has a past surgical history that includes Wrist surgery and pr colonoscopy flx dx w/collj spec when pfrmd (N/A, 1/10/2019)  Her family history includes Diabetes type II in her father and mother; Hypertension in her father and mother; Other in her mother  She  reports that she quit smoking about 14 years ago  Her smoking use included Cigarettes  She smoked 0 25 packs per day  She has never used smokeless tobacco  She reports that she drinks about 3 6 oz of alcohol per week   She reports that she does not use drugs  She is allergic to penicillins; ciprofloxacin; dilantin [phenytoin]; phenobarbital; aspirin; cephalexin; and mephobarbital     Review of Systems   Constitutional: Negative for chills and fever  HENT: Negative for sore throat  Eyes: Negative for visual disturbance  Respiratory: Negative for shortness of breath  Cardiovascular: Negative for chest pain  Gastrointestinal: Negative for abdominal pain  Genitourinary: Negative for flank pain  Musculoskeletal: Positive for arthralgias  Negative for joint swelling  Skin: Negative for rash and wound  Neurological: Negative for weakness and numbness  Hematological: Does not bruise/bleed easily  Psychiatric/Behavioral: Negative for self-injury  Objective:  Vitals:    01/23/19 0907 01/23/19 0935   BP: 154/93 144/82   BP Location:  Right arm   Patient Position:  Sitting   Cuff Size:  Large   Pulse: (!) 112    Weight: 79 4 kg (175 lb)    Height: 5' 5" (1 651 m)      Back Exam     Comments:  Cervical spine  -no tenderness  -normal range of motion      Left Elbow Exam     Tenderness   The patient is experiencing no tenderness  Range of Motion   The patient has normal left elbow ROM  Muscle Strength   The patient has normal left elbow strength  Left Shoulder Exam     Tenderness   The patient is experiencing tenderness in the biceps tendon (Lateral subacromial, anterior soft tissues, coracoid process)      Range of Motion   Active Abduction: 0   Passive Abduction: 10   Forward Flexion: 10   Left shoulder internal rotation 0 degrees: Left buttock  Physical Exam   Constitutional: She is oriented to person, place, and time  She appears well-developed  No distress  HENT:   Head: Normocephalic  Eyes: Conjunctivae are normal    Neck: No tracheal deviation present  Cardiovascular:   Tachycardic   Pulmonary/Chest: Effort normal  No respiratory distress  Abdominal: She exhibits no distension  Neurological: She is alert and oriented to person, place, and time  Skin: Skin is warm and dry  Psychiatric: She has a normal mood and affect  Her behavior is normal    Nursing note and vitals reviewed  I have personally reviewed pertinent films in PACS and my interpretation is Subacromial calcification of left shoulder      Large joint arthrocentesis  Date/Time: 1/23/2019 9:28 AM  Consent given by: patient  Site marked: site marked  Timeout: Immediately prior to procedure a time out was called to verify the correct patient, procedure, equipment, support staff and site/side marked as required   Supporting Documentation  Indications: pain   Procedure Details  Location: shoulder - L subacromial bursa  Preparation: Patient was prepped and draped in the usual sterile fashion  Needle size: 22 G  Ultrasound guidance: no  Approach: posterior  Medications administered: 3 mL lidocaine 1 %; 4 mL lidocaine 1 %; 40 mg triamcinolone acetonide 40 mg/mL

## 2019-01-23 NOTE — PROGRESS NOTES
Assessment/Plan:  Assessment/Plan   Diagnoses and all orders for this visit:    Subacromial bursitis of left shoulder joint  -     Large joint arthrocentesis  -     predniSONE 20 mg tablet; Take 1 tablet (20 mg total) by mouth daily for 6 days  -     Ambulatory referral to Physical Therapy; Future    Biceps tendinitis of left shoulder  -     predniSONE 20 mg tablet; Take 1 tablet (20 mg total) by mouth daily for 6 days  -     Ambulatory referral to Physical Therapy; Future    Adhesive capsulitis of left shoulder  -     Ambulatory referral to Physical Therapy; Future      40-year-old right-hand-dominant female with recurrent left shoulder pain and stiffness of more than 2 years duration  Discussed with patient physical exam, radiographs, impression and plan  X-rays noted for subacromial calcification  Physical exam noted for significant tenderness upon palpation of the lateral subacromial area as well as the biceps tendon and coracoid process  She has limited range of motion even passively with forward flexion to 10°, abduction to 10°, and internal rotation to the buttock  Clinical impression is subacromial bursitis, biceps tendinitis, and likely adhesive capsulitis  I discussed with patient that most of her pain is on more superficial aspect so I discussed treatment option of subacromial corticosteroid injection to which she agreed  I administered mixture of 4 cc 1% lidocaine 1 cc Kenalog to the subacromial space without complication  Subjective:   Patient ID: Raiza Tejeda is a 46 y o  female  Chief Complaint   Patient presents with    Left Shoulder - Pain       40-year-old right-hand-dominant female preserve evaluation of left shoulder pain of more than 2 years duration  She denies any trauma or inciting event    Pain described as gradual onset, localized mainly to the anterior aspect of shoulder, achy and throbbing, radiating distally along the anterior lateral aspect the upper arm, constant, fluctuating intensity, worse with move the arm, associated with limited range of motion, and improved with return to neutral position  She reports having had done physical therapy in the past which provided mild improvement in pain and range of motion  She discontinued therapy in the past due to developing headache  Since then she has been taking Tylenol and taking prednisone intermittently for pain  She reports that up until about 1 week ago pain was mild in intensity, but then pain suddenly worsened without inciting event  She has difficulty with performing tasks such as doing her hair and getting dressed  Shoulder Pain   This is a chronic problem  The current episode started more than 1 year ago  The problem occurs constantly  The problem has been gradually worsening  Associated symptoms include arthralgias  Pertinent negatives include no abdominal pain, chest pain, chills, fever, joint swelling, numbness, rash, sore throat or weakness  Exacerbated by: Arm movement  She has tried rest and acetaminophen (Physical therapy) for the symptoms  The treatment provided mild relief  The following portions of the patient's history were reviewed and updated as appropriate: She  has a past medical history of Asthma; Hypoglycemia; Idiopathic angioedema; and Seizures ([facility] [address] )  She  has a past surgical history that includes Wrist surgery and pr colonoscopy flx dx w/collj spec when pfrmd (N/A, 1/10/2019)  Her family history includes Diabetes type II in her father and mother; Hypertension in her father and mother; Other in her mother  She  reports that she quit smoking about 14 years ago  Her smoking use included Cigarettes  She smoked 0 25 packs per day  She has never used smokeless tobacco  She reports that she drinks about 3 6 oz of alcohol per week   She reports that she does not use drugs    She is allergic to penicillins; ciprofloxacin; dilantin [phenytoin]; phenobarbital; aspirin; cephalexin; and mephobarbital     Review of Systems   Constitutional: Negative for chills and fever  HENT: Negative for sore throat  Eyes: Negative for visual disturbance  Respiratory: Negative for shortness of breath  Cardiovascular: Negative for chest pain  Gastrointestinal: Negative for abdominal pain  Genitourinary: Negative for flank pain  Musculoskeletal: Positive for arthralgias  Negative for joint swelling  Skin: Negative for rash and wound  Neurological: Negative for weakness and numbness  Hematological: Does not bruise/bleed easily  Psychiatric/Behavioral: Negative for self-injury  Objective:  Vitals:    01/23/19 0907 01/23/19 0935   BP: 154/93 144/82   BP Location:  Right arm   Patient Position:  Sitting   Cuff Size:  Large   Pulse: (!) 112    Weight: 79 4 kg (175 lb)    Height: 5' 5" (1 651 m)      Back Exam     Comments:  Cervical spine  -no tenderness  -normal range of motion      Left Elbow Exam     Tenderness   The patient is experiencing no tenderness  Range of Motion   The patient has normal left elbow ROM  Muscle Strength   The patient has normal left elbow strength  Left Shoulder Exam     Tenderness   The patient is experiencing tenderness in the biceps tendon (Lateral subacromial, anterior soft tissues, coracoid process)  Range of Motion   Active Abduction: 0   Passive Abduction: 10   Forward Flexion: 10   Left shoulder internal rotation 0 degrees: Left buttock  Physical Exam   Constitutional: She is oriented to person, place, and time  She appears well-developed  No distress  HENT:   Head: Normocephalic  Eyes: Conjunctivae are normal    Neck: No tracheal deviation present  Cardiovascular:   Tachycardic   Pulmonary/Chest: Effort normal  No respiratory distress  Abdominal: She exhibits no distension  Neurological: She is alert and oriented to person, place, and time  Skin: Skin is warm and dry     Psychiatric: She has a normal mood and affect  Her behavior is normal    Nursing note and vitals reviewed  I have personally reviewed pertinent films in PACS and my interpretation is Subacromial calcification of left shoulder      Large joint arthrocentesis  Date/Time: 1/23/2019 9:28 AM  Consent given by: patient  Site marked: site marked  Timeout: Immediately prior to procedure a time out was called to verify the correct patient, procedure, equipment, support staff and site/side marked as required   Supporting Documentation  Indications: pain   Procedure Details  Location: shoulder - L subacromial bursa  Preparation: Patient was prepped and draped in the usual sterile fashion  Needle size: 22 G  Ultrasound guidance: no  Approach: posterior  Medications administered: 3 mL lidocaine 1 %; 4 mL lidocaine 1 %; 40 mg triamcinolone acetonide 40 mg/mL

## 2019-02-07 ENCOUNTER — OFFICE VISIT (OUTPATIENT)
Dept: OBGYN CLINIC | Facility: CLINIC | Age: 49
End: 2019-02-07
Payer: COMMERCIAL

## 2019-02-07 VITALS
BODY MASS INDEX: 29.16 KG/M2 | DIASTOLIC BLOOD PRESSURE: 82 MMHG | HEART RATE: 92 BPM | HEIGHT: 65 IN | WEIGHT: 175 LBS | SYSTOLIC BLOOD PRESSURE: 133 MMHG

## 2019-02-07 DIAGNOSIS — M75.22 BICEPS TENDINITIS OF LEFT SHOULDER: ICD-10-CM

## 2019-02-07 DIAGNOSIS — M75.102 ROTATOR CUFF SYNDROME OF LEFT SHOULDER: ICD-10-CM

## 2019-02-07 DIAGNOSIS — M25.612 SHOULDER STIFFNESS, LEFT: ICD-10-CM

## 2019-02-07 DIAGNOSIS — M75.32 CALCIFIC TENDINITIS OF LEFT SHOULDER: Primary | ICD-10-CM

## 2019-02-07 PROCEDURE — 99213 OFFICE O/P EST LOW 20 MIN: CPT | Performed by: FAMILY MEDICINE

## 2019-02-07 NOTE — PROGRESS NOTES
Assessment/Plan:  Assessment/Plan   Diagnoses and all orders for this visit:    Calcific tendinitis of left shoulder  -     Ambulatory referral to Physical Therapy; Future    Biceps tendinitis of left shoulder  -     Ambulatory referral to Physical Therapy; Future    Rotator cuff syndrome of left shoulder  -     Ambulatory referral to Physical Therapy; Future    Shoulder stiffness, left  -     Ambulatory referral to Physical Therapy; Future      25-year-old right-hand-dominant female with recurrent left shoulder pain and stiffness of more than 2 years duration  Discussed with patient physical exam, impression and plan  Physical exam noted for mild tenderness upon palpation of the lateral subacromial area, biceps tendon, and coracoid process  She has active range of motion limited to forward flexion of 90°, abduction to 60°, and internal rotation to the thoracic spine  Passive range of motion is noted to be forward flexion of 160° and abduction to 100°  She has 4+/5 strength supraspinatus  Clinical impression is that she responded adequately to corticosteroid injection  She is scheduled to start physical therapy on 02/12/2019 and she is advised to proceed as scheduled  She is also continue with range of motion exercises on her own at home and she is also start taking tumeric 500 mg twice daily  Since she has history of idiopathic angioedema we will refrain from prescribing her NSAIDs  She will follow up in 4 weeks at which point she will be re-evaluated  Subjective:   Patient ID: Lucas Landeros is a 46 y o  female  Chief Complaint   Patient presents with    Left Shoulder - Follow-up       25-year-old right-hand-dominant female following up for left shoulder pain and stiffness of more than 2 years duration  She was last seen 2 weeks ago at which point she was assessed to have subacromial bursitis and possibly adhesive capsulitis    She was given subacromial corticosteroid injection, prescribed prednisone 20 mg once daily, and referred to physical therapy  Today she reports significant improvement in her pain  Pain started to improve the day after injection and has been gradually improving since then  She has pain described as generalized to the shoulder but worse at the anterior lateral aspect, achy, 1 out of 10 intensity, worse with movement of the arm, associated with limited motion, and improved with rest   She is scheduled to start physical therapy on 02/12/2019  She has already been doing home exercises on her own including pendulum swing and walk crawling with hands  She also reports being better able to sleep since pain is improved and she reports improved range of motion  Shoulder Pain   This is a chronic problem  The current episode started more than 1 year ago  The problem occurs constantly  The problem has been gradually improving  Associated symptoms include arthralgias  Pertinent negatives include no joint swelling, numbness or weakness  Exacerbated by: Arm use  She has tried rest (Corticosteroid injection, oral prednisone) for the symptoms  The treatment provided moderate relief  Review of Systems   Musculoskeletal: Positive for arthralgias  Negative for joint swelling  Neurological: Negative for weakness and numbness  Objective:  Vitals:    02/07/19 0800   BP: 133/82   Pulse: 92   Weight: 79 4 kg (175 lb)   Height: 5' 5" (1 651 m)     Left Shoulder Exam     Tenderness   The patient is experiencing tenderness in the biceps tendon (Lateral subacromial, coracoid process)      Range of Motion   Active Abduction: 60   Passive Abduction: 100   Forward Flexion: 90 (Passive 160°)   Internal Rotation 0 degrees: Mid thoracic     Muscle Strength   Abduction: 5/5   Internal Rotation: 5/5   External Rotation: 5/5   Subscapularis: 5/5   Biceps: 5/5     Comments:  4+/5 strength supraspinatus            Physical Exam   Constitutional: She is oriented to person, place, and time  She appears well-developed  No distress  HENT:   Head: Normocephalic  Eyes: Conjunctivae are normal    Neck: No tracheal deviation present  Cardiovascular: Normal rate  Pulmonary/Chest: Effort normal  No respiratory distress  Abdominal: She exhibits no distension  Neurological: She is alert and oriented to person, place, and time  Skin: Skin is warm and dry  Psychiatric: She has a normal mood and affect  Her behavior is normal    Nursing note and vitals reviewed

## 2019-02-12 ENCOUNTER — EVALUATION (OUTPATIENT)
Dept: PHYSICAL THERAPY | Facility: CLINIC | Age: 49
End: 2019-02-12
Payer: COMMERCIAL

## 2019-02-12 DIAGNOSIS — M75.52 SUBACROMIAL BURSITIS OF LEFT SHOULDER JOINT: ICD-10-CM

## 2019-02-12 DIAGNOSIS — M75.22 BICEPS TENDINITIS OF LEFT SHOULDER: ICD-10-CM

## 2019-02-12 DIAGNOSIS — M75.02 ADHESIVE CAPSULITIS OF LEFT SHOULDER: ICD-10-CM

## 2019-02-12 PROCEDURE — 97110 THERAPEUTIC EXERCISES: CPT | Performed by: PHYSICAL THERAPIST

## 2019-02-12 PROCEDURE — G8991 OTHER PT/OT GOAL STATUS: HCPCS | Performed by: PHYSICAL THERAPIST

## 2019-02-12 PROCEDURE — G8990 OTHER PT/OT CURRENT STATUS: HCPCS | Performed by: PHYSICAL THERAPIST

## 2019-02-12 PROCEDURE — 97161 PT EVAL LOW COMPLEX 20 MIN: CPT | Performed by: PHYSICAL THERAPIST

## 2019-02-12 NOTE — PROGRESS NOTES
PT Evaluation     Today's date: 2019  Patient name: Jay Callahan  : 1970 MRN: [id number]  Referring provider: Angelique Joshi DO  Dx:   Encounter Diagnosis     ICD-10-CM    1  Subacromial bursitis of left shoulder joint M75 52 Ambulatory referral to Physical Therapy   2  Biceps tendinitis of left shoulder M75 22 Ambulatory referral to Physical Therapy   3  Adhesive capsulitis of left shoulder M75 02 Ambulatory referral to Physical Therapy                  Assessment  Assessment details: Patient was provided a home exercise program and demonstrated an understanding of exercises  Patient was advised to stop performing home exercise program if symptoms increase or new complaints developed  Verbal understanding demonstrated regarding home exercise program instructions  Patient would benefit from skilled physical therapy services for prescribed exercises, manual interventions, neuromuscular re-education, education, and modalities as deemed appropriate to assist patient in achieving their maximum level of function  Patient presents with restricted AROM / PROM left shoulder, (+) weakness Left shoulder, decreased functional use and (+) pain  Impairments: abnormal or restricted ROM, activity intolerance, impaired physical strength, lacks appropriate home exercise program, pain with function and poor posture   Understanding of Dx/Px/POC: good  Goals  STG   1  Patient will demonstrate independence and competence with HEP 2 -4 weeks  2  Patient will report > 25-50% reduced pain 2-4 weeks    LTG   1  Patient will report improvements with both functional and recreational abilities  4-6 weeks  2  Patient will demonstrate improved motor function  4-6 weeks       Plan  Plan details: Patient response to treatment will be monitored each session and progressed accordingly    Thank you for this referral    Patient would benefit from: skilled physical therapy  Planned therapy interventions: IADL retraining, joint mobilization, manual therapy, patient education, postural training, strengthening, stretching, therapeutic exercise, flexibility and home exercise program  Frequency: 2x week  Duration in weeks: 4  Treatment plan discussed with: patient        Subjective Evaluation    History of Present Illness  Mechanism of injury: Patient reports insidious onset of left shoulder pain 2 years ago  She notes that is she overdid it with exercising/ lifting she would feel discomfort  Approximately 1 1/2 years ago, her pain worsened  - she attended PT at that time with "some" improvement  3 weeks ago, she started with an achey feeling - the following morning, she was unable to lift her arm and she describes it as burning  She went to her family MD - then to Ortho where she received a cortizone injection  She was placed on Prednisone as well and notes that she feels approximately 80% better  She is unable to take anti-inflammatories due to underlying angioedema  Pain  Current pain ratin  At best pain ratin  At worst pain rating: 3  Quality: dull ache  Relieving factors: heat and medications  Aggravating factors: overhead activity  Progression: improved    Social Support  Lives with: spouse    Employment status: working ([employer])  Hand dominance: right      Diagnostic Tests  X-ray: abnormal  Patient Goals  Patient goals for therapy: increased motion, independence with ADLs/IADLs, return to sport/leisure activities and decreased pain          Objective     Postural Observations  Seated posture: fair  Standing posture: fair  Correction of posture: has no consistent effect    Additional Postural Observation Details  Patient with rounded shoulder, min fhp  She is able to self correct her posturing with cueing  Observations     Additional Observation Details  Unremarkable     Palpation   Left   Tenderness of the anterior deltoid, biceps and supraspinatus       Tenderness Left Shoulder   Tenderness in the [facility] joint, acromion, biceps tendon (proximal) and coracoid process  No tenderness in the supraspinatus tendon  Cervical/Thoracic Screen   Cervical range of motion within normal limits    Neurological Testing     Sensation     Shoulder   Left Shoulder   Intact: light touch    Right Shoulder   Intact: light touch    Active Range of Motion   Left Shoulder   Flexion: 100 degrees with pain  Abduction: 65 degrees with pain  External rotation 90°: with pain  Internal rotation 90°: with pain    Right Shoulder   Normal active range of motion    Additional Active Range of Motion Details  Seated  ER - to back of head  IR - to lateral left LB    Supine  Flexion = 130  abd = 60  IR = wfl  ER = 30     Passive Range of Motion   Left Shoulder   Flexion: 130 degrees with pain  Abduction: 85 degrees with pain  External rotation 90°: 35 degrees with pain  Internal rotation 90°: WFL    Strength/Myotome Testing     Left Shoulder     Planes of Motion   Flexion: 3-   Extension: 4-   Abduction: 3-   Adduction: 4-   External rotation at 0°: 3+   Internal rotation at 0°: 4-     Isolated Muscles   Biceps: 4+   Upper trapezius: 4     Right Shoulder   Normal muscle strength    Tests     Left Shoulder   Positive empty can, Hawkin's and Neer's  Negative drop arm  Flowsheet Rows      Most Recent Value   PT/OT G-Codes   Current Score  47   Projected Score  68          Precautions: Left shoulder Pain , calcific tendonitis    Daily Treatment Diary     Manual              Left shoulder AAROM             Left shoulder jt   mobs                                                        Exercise Diary  2/12            Wall slides 10s x 10            Back rolls/ scap retract 20x ea             IR Towel stretch 20s x 5            Posterior capsule stretch 20s x 5            pect stretch             Supine cane flex/ cp             Serratus/ triceps                          Supine AROM flex/ abd to 90 Prone 45, 90 x 2                          TB no monies, rows, lpd                                                                                                                         Modalities              H-wave w/ mhp  High/ low Left shoulder 15'

## 2019-02-14 ENCOUNTER — OFFICE VISIT (OUTPATIENT)
Dept: PHYSICAL THERAPY | Facility: CLINIC | Age: 49
End: 2019-02-14
Payer: COMMERCIAL

## 2019-02-14 DIAGNOSIS — M75.52 SUBACROMIAL BURSITIS OF LEFT SHOULDER JOINT: Primary | ICD-10-CM

## 2019-02-14 DIAGNOSIS — M75.22 BICEPS TENDINITIS OF LEFT SHOULDER: ICD-10-CM

## 2019-02-14 DIAGNOSIS — M75.02 ADHESIVE CAPSULITIS OF LEFT SHOULDER: ICD-10-CM

## 2019-02-14 PROCEDURE — 97014 ELECTRIC STIMULATION THERAPY: CPT

## 2019-02-14 PROCEDURE — 97112 NEUROMUSCULAR REEDUCATION: CPT

## 2019-02-14 PROCEDURE — 97140 MANUAL THERAPY 1/> REGIONS: CPT

## 2019-02-14 PROCEDURE — 97110 THERAPEUTIC EXERCISES: CPT

## 2019-02-14 NOTE — PROGRESS NOTES
Daily Note     Today's date: 2019  Patient name: Adeline Shabazz  : 1970  MRN: [id number]  Referring provider: Óscar Ramsey DO  Dx:   Encounter Diagnosis     ICD-10-CM    1  Subacromial bursitis of left shoulder joint M75 52    2  Biceps tendinitis of left shoulder M75 22    3  Adhesive capsulitis of left shoulder M75 02                   Subjective: Pt reports that she currently is having minimal pain 1/10 in her L shoulder  Notes that it gets worse as she uses it more  Objective: See treatment diary below  Precautions: Left shoulder Pain , calcific tendonitis    Daily Treatment Diary     Manual              Left shoulder AAROM MM            Left shoulder jt  mobs                                                        Exercise Diary             Wall slides 10s x 10 10s x 10           Back rolls/ scap retract 20x ea  20x ea           IR Towel stretch 20s x 5 20s x 5           Posterior capsule stretch 20s x 5 20s x 5           pect stretch             Supine cane flex/ cp  10"x 10           Serratus/ triceps  2x10 ea                        Supine AROM flex/ abd to 90  2x10           Prone 45, 90 x 2  45* only 10x                        TB no monies, rows, lpd                                                                                                                         Modalities              H-wave w/ mhp  High/ low Left shoulder 15'  15'                                         Assessment: Tolerated treatment fair  Tightness was noted in all shoulder directions being mostly limited in flexion and abd with a pain full end range  She was unable to perform prone 90* and only was able to tolerate 10x of 45*  Minimal UT compensation noted with scap retraction  Patient demonstrated fatigue post treatment, exhibited good technique with therapeutic exercises and would benefit from continued PT  Ended with heat and H-wave  Plan: Continue per plan of care

## 2019-02-18 ENCOUNTER — OFFICE VISIT (OUTPATIENT)
Dept: PHYSICAL THERAPY | Facility: CLINIC | Age: 49
End: 2019-02-18
Payer: COMMERCIAL

## 2019-02-18 DIAGNOSIS — M75.52 SUBACROMIAL BURSITIS OF LEFT SHOULDER JOINT: Primary | ICD-10-CM

## 2019-02-18 DIAGNOSIS — M75.02 ADHESIVE CAPSULITIS OF LEFT SHOULDER: ICD-10-CM

## 2019-02-18 DIAGNOSIS — M75.22 BICEPS TENDINITIS OF LEFT SHOULDER: ICD-10-CM

## 2019-02-18 PROCEDURE — 97110 THERAPEUTIC EXERCISES: CPT | Performed by: PHYSICAL THERAPIST

## 2019-02-18 PROCEDURE — 97140 MANUAL THERAPY 1/> REGIONS: CPT | Performed by: PHYSICAL THERAPIST

## 2019-02-18 PROCEDURE — 97530 THERAPEUTIC ACTIVITIES: CPT | Performed by: PHYSICAL THERAPIST

## 2019-02-18 NOTE — PROGRESS NOTES
Daily Note     Today's date: 2019  Patient name: Maggie Christopher  : 1970  MRN: [id number]  Referring provider: Nathan Hernandez DO  Dx:   Encounter Diagnosis     ICD-10-CM    1  Subacromial bursitis of left shoulder joint M75 52    2  Biceps tendinitis of left shoulder M75 22    3  Adhesive capsulitis of left shoulder M75 02                   Subjective: Pt reports that she is achey in the shoulder today- notes that she may be "overdoing it", but feels that she should be pushing it a bit  Patient reports that the discomfort is noted more with eccentric motions and that she feels the discomfort lateral humeral region  Patient notes 'pops" off and on during session  Continues to c/o "catching " sensation during AAROM      Objective: See treatment diary below  Precautions: Left shoulder Pain , calcific tendonitis    Daily Treatment Diary     Manual             Left shoulder AAROM MM db           Left shoulder jt  mobs  db                                                      Exercise Diary            UBE   10' alt          pulleys   5'          Wall slides 10s x 10 10s x 10 10s x 10          Back rolls/ scap retract 20x ea  20x ea 20 x          IR Towel stretch 20s x 5 20s x 5 10s x 20          Posterior capsule stretch 20s x 5 20s x 5           pect stretch             Supine cane flex/ cp  10"x 10 1 5# x 20           Serratus/ triceps  2x10 ea 2# x 20                       Supine AROM flex/ abd to 90  2x10 2# x push x 20 ea           Prone 45, 90 x 2  45* only 10x 45/rows 10x 2                        TB no monies, rows, lpd   rtb x 20 ea                                                                                                                       Modalities             H-wave w/ mhp  High/ low Left shoulder 15'  15'           mhp L    10'                           Assessment: Tolerated treatment fair  Patient with difficulty relaxing during AA movements  (+) discomfort reported midrange all planes  Plan: Continue per plan of care  Focus on rom restoration within patient tolerance  Monitor pain levels

## 2019-02-22 ENCOUNTER — OFFICE VISIT (OUTPATIENT)
Dept: PHYSICAL THERAPY | Facility: CLINIC | Age: 49
End: 2019-02-22
Payer: COMMERCIAL

## 2019-02-22 DIAGNOSIS — M75.22 BICEPS TENDINITIS OF LEFT SHOULDER: ICD-10-CM

## 2019-02-22 DIAGNOSIS — M75.02 ADHESIVE CAPSULITIS OF LEFT SHOULDER: ICD-10-CM

## 2019-02-22 DIAGNOSIS — M75.52 SUBACROMIAL BURSITIS OF LEFT SHOULDER JOINT: Primary | ICD-10-CM

## 2019-02-22 PROCEDURE — 97140 MANUAL THERAPY 1/> REGIONS: CPT | Performed by: PHYSICAL THERAPIST

## 2019-02-22 PROCEDURE — 97110 THERAPEUTIC EXERCISES: CPT | Performed by: PHYSICAL THERAPIST

## 2019-02-22 NOTE — PROGRESS NOTES
Daily Note     Today's date: 2019  Patient name: Helio James  : 1970  MRN: [id number]  Referring provider: Lizbeth Yao DO  Dx:   Encounter Diagnosis     ICD-10-CM    1  Subacromial bursitis of left shoulder joint M75 52    2  Biceps tendinitis of left shoulder M75 22    3  Adhesive capsulitis of left shoulder M75 02                   Subjective: Patient reports that she sore today- she reports that she felt a "pop" again this am with some discomfort in the shower after attempting to shave and lift her left arm up for positioning  Overall, she is noticing some small gains since attending PT  Patient has appointment this AM - she needs to leave at 8:15 this am     Objective: See treatment diary below  Precautions: Left shoulder Pain , calcific tendonitis    Daily Treatment Diary     Manual            Left shoulder AAROM MM db db          Left shoulder jt  mobs  db db                                                     Exercise Diary           UBE   10' alt 10'         pulleys   5' 3'         Wall slides 10s x 10 10s x 10 10s x 10 10s x10          Back rolls/ scap retract 20x ea  20x ea 20 x 20x          IR Towel stretch 20s x 5 20s x 5 10s x 20 20s x 5         Posterior capsule stretch 20s x 5 20s x 5  20s x 5         pect stretch             Supine cane flex/ cp  10"x 10 1 5# x 20  2# x 20          Serratus/ triceps  2x10 ea 2# x 20 2# x 20                       Supine AROM flex/ abd to 90  2x10 2# x push x 20 ea  2# x 20          Prone 45, 90 x 2  45* only 10x 45/rows 10x 2                        TB no monies, rows, lpd   rtb x 20 ea  rtb x 20                       BOR, bicep curls     2# x 20                                                                                           Modalities             H-wave w/ mhp  High/ low Left shoulder 15'  15'           mhp L    10'                           Assessment: Tolerated treatment fair  Able to get thru better AAROM today - distraction thru movement provides for better patient tolerance  Plan: Continue per plan of care  Focus on rom restoration within patient tolerance

## 2019-02-25 ENCOUNTER — OFFICE VISIT (OUTPATIENT)
Dept: PHYSICAL THERAPY | Facility: CLINIC | Age: 49
End: 2019-02-25
Payer: COMMERCIAL

## 2019-02-25 DIAGNOSIS — M75.52 SUBACROMIAL BURSITIS OF LEFT SHOULDER JOINT: Primary | ICD-10-CM

## 2019-02-25 DIAGNOSIS — M75.02 ADHESIVE CAPSULITIS OF LEFT SHOULDER: ICD-10-CM

## 2019-02-25 DIAGNOSIS — M75.22 BICEPS TENDINITIS OF LEFT SHOULDER: ICD-10-CM

## 2019-02-25 PROCEDURE — 97110 THERAPEUTIC EXERCISES: CPT

## 2019-02-25 PROCEDURE — 97140 MANUAL THERAPY 1/> REGIONS: CPT

## 2019-02-25 PROCEDURE — 97112 NEUROMUSCULAR REEDUCATION: CPT

## 2019-02-25 NOTE — PROGRESS NOTES
Daily Note     Today's date: 2019  Patient name: Madeleine Espinoza  : 1970  MRN: [id number]  Referring provider: Eric Hyman DO  Dx:   Encounter Diagnosis     ICD-10-CM    1  Subacromial bursitis of left shoulder joint M75 52    2  Biceps tendinitis of left shoulder M75 22    3  Adhesive capsulitis of left shoulder M75 02                   Subjective: Upon presentation patient reports "achiness" in L shoulder  Objective: See treatment diary below      Manual           Left shoulder AAROM MM db db JK         Left shoulder jt  mobs  db db                                                     Exercise Diary          UBE   10' alt 10' 10'        pulleys   5' 3' 4'        Wall slides 10s x 10 10s x 10 10s x 10 10s x10  10sx10        Back rolls/ scap retract 20x ea  20x ea 20 x 20x  20x        IR Towel stretch 20s x 5 20s x 5 10s x 20 20s x 5 20sx5        Posterior capsule stretch 20s x 5 20s x 5  20s x 5 20s 5x        pect stretch             Supine cane flex/ cp  10"x 10 1 5# x 20  2# x 20  2# 20x        Serratus/ triceps  2x10 ea 2# x 20 2# x 20  2# 20x                     Supine AROM flex/ abd to 90  2x10 2# x push x 20 ea  2# x 20  2# x20 flex        Prone 45, 90 x 2  45* only 10x 45/rows 10x 2   10x2                     TB no monies, rows, lpd   rtb x 20 ea  rtb x 20  rtb 20x                     BOR, bicep curls     2# x 20 2# x20                                                                                          Modalities            H-wave w/ mhp  High/ low Left shoulder 15'  15'  pd         mhp L    10' pd                          Assessment: Patient demonstrating improved improved shoulder motion passively and actively  Empty end feel noted, pain limiting motion  Patient was provided with updated home exercise program and demonstrates verbal  understanding of prescribed exercises and instructions   Patient advised to stop performing home exercise program if symptoms increase or new complaints developed  Monitor Nv  Patient deferred cp this visit, and offers no complaints post session  Plan: Cont per POC

## 2019-03-01 ENCOUNTER — OFFICE VISIT (OUTPATIENT)
Dept: PHYSICAL THERAPY | Facility: CLINIC | Age: 49
End: 2019-03-01
Payer: COMMERCIAL

## 2019-03-01 DIAGNOSIS — M75.52 SUBACROMIAL BURSITIS OF LEFT SHOULDER JOINT: Primary | ICD-10-CM

## 2019-03-01 DIAGNOSIS — M75.02 ADHESIVE CAPSULITIS OF LEFT SHOULDER: ICD-10-CM

## 2019-03-01 DIAGNOSIS — M75.22 BICEPS TENDINITIS OF LEFT SHOULDER: ICD-10-CM

## 2019-03-01 PROCEDURE — 97140 MANUAL THERAPY 1/> REGIONS: CPT | Performed by: PHYSICAL THERAPIST

## 2019-03-01 PROCEDURE — 97530 THERAPEUTIC ACTIVITIES: CPT | Performed by: PHYSICAL THERAPIST

## 2019-03-01 PROCEDURE — 97110 THERAPEUTIC EXERCISES: CPT | Performed by: PHYSICAL THERAPIST

## 2019-03-01 NOTE — PROGRESS NOTES
Daily Note     Today's date: 3/1/2019  Patient name: Malou Skinner  : 1970  MRN: [id number]  Referring provider: Ihsan Gray DO  Dx:   Encounter Diagnosis     ICD-10-CM    1  Subacromial bursitis of left shoulder joint M75 52    2  Biceps tendinitis of left shoulder M75 22    3  Adhesive capsulitis of left shoulder M75 02                   Subjective: Upon presentation patient reports "achiness" in L shoulder  Objective: See treatment diary below      Manual  2/14 2/ 18 2/22 2/25 3/1        Left shoulder AAROM MM db db JK         Left shoulder jt  mobs  db db  db        Left shoulder AA ext with overpressure     db                                      Exercise Diary  2/12 2/14 2/ 18 2/22 2/25 3/1       UBE   10' alt 10' 10' 10'       pulleys   5' 3' 4' 4'       Wall slides 10s x 10 10s x 10 10s x 10 10s x10  10sx 10 10s x 10       Back rolls/ scap retract 20x ea  20x ea 20 x 20x  20x 20x ea       IR Towel stretch 20s x 5 20s x 5 10s x 20 20s x 5 20sx5 20s x 5       Posterior capsule stretch 20s x 5 20s x 5  20s x 5 20s 5x        Repeated ext in stand - Indep and with cane OP      rodrick  20 x 2  ea                    Supine cane flex/ cp  10"x 10 1 5# x 20  2# x 20  2# 20x dc       Serratus/ triceps  2x10 ea 2# x 20 2# x 20  2# 20x 2# x 20                     Supine AROM flex/ abd to 90  2x10 2# x push x 20 ea  2# x 20  2# x20 flex        Prone 45, 90 x 2  45* only 10x 45/rows 10x 2   10x2                     TB no monies, rows, lpd   rtb x 20 ea  rtb x 20  rtb 20x                     BOR, bicep curls     2# x 20 2# x20                                                                                          Modalities            H-wave w/ mhp  High/ low Left shoulder 15'  15'  pd         mhp L    10' pd                          Assessment: patient gets relief following repeated movements with increased AROM, less pain/ less catching demonstrated  Plan: Cont per POC     Monitor response to repeated movements over the weekend

## 2019-03-04 ENCOUNTER — OFFICE VISIT (OUTPATIENT)
Dept: PHYSICAL THERAPY | Facility: CLINIC | Age: 49
End: 2019-03-04
Payer: COMMERCIAL

## 2019-03-04 DIAGNOSIS — M75.22 BICEPS TENDINITIS OF LEFT SHOULDER: ICD-10-CM

## 2019-03-04 DIAGNOSIS — M75.02 ADHESIVE CAPSULITIS OF LEFT SHOULDER: ICD-10-CM

## 2019-03-04 DIAGNOSIS — M75.52 SUBACROMIAL BURSITIS OF LEFT SHOULDER JOINT: Primary | ICD-10-CM

## 2019-03-04 PROCEDURE — 97530 THERAPEUTIC ACTIVITIES: CPT | Performed by: PHYSICAL THERAPIST

## 2019-03-04 PROCEDURE — 97110 THERAPEUTIC EXERCISES: CPT | Performed by: PHYSICAL THERAPIST

## 2019-03-04 NOTE — PROGRESS NOTES
Daily Note     Today's date: 3/4/2019  Patient name: Nelida Love  : 1970  MRN: [id number]  Referring provider: Villa Johnson DO  Dx:   Encounter Diagnosis     ICD-10-CM    1  Subacromial bursitis of left shoulder joint M75 52    2  Biceps tendinitis of left shoulder M75 22    3  Adhesive capsulitis of left shoulder M75 02                   Subjective: Patient reports that she "is better" today- pleased to notice some (+) responses to treatment  Patient notes that she is using her arm more, but still feels the weakness with above shoulder activities  Objective: See treatment diary below      Manual  2/14 2/ 18 2/22 2/25 3/1 3       Left shoulder AAROM MM db db JK         Left shoulder jt   mobs  db db  db        Left shoulder AA ext with overpressure     db                                      Exercise Diary  2/12 2/14 2/ 18 2/22 2/25 3/1 3      UBE   10' alt 10' 10' 10' 10'      pulleys   5' 3' 4' 4' 4'      Wall slides 10s x 10 10s x 10 10s x 10 10s x10  10sx 10 10s x 10 10s x 10      Back rolls/ scap retract 20x ea  20x ea 20 x 20x  20x 20x ea 20x ea      IR Towel stretch 20s x 5 20s x 5 10s x 20 20s x 5 20sx5 20s x 5 20s x 5      Posterior capsule stretch 20s x 5 20s x 5  20s x 5 20s 5x        Repeated ext in stand - Indep and with cane OP      rodrick  20 x 2  ea 20x 2                    Supine cane flex/ cp  10"x 10 1 5# x 20  2# x 20  2# 20x dc       Serratus/ triceps  2x10 ea 2# x 20 2# x 20  2# 20x 2# x 20  3# x 20                   Supine AROM flex/ abd to 90  2x10 2# x push x 20 ea  2# x 20  2# x20 flex        Prone 45, 90 x 2  45* only 10x 45/rows 10x 2   10x2  45 only x 20                      TB no monies, rows, lpd   rtb x 20 ea  rtb x 20  rtb 20x  rtb x 20                    BOR, bicep curls     2# x 20 2# x20  3# x 20                                                                                         Modalities            H-wave w/ mhp  High/ low Left shoulder 15'  15'  pd         mhp L    10' pd                          Assessment: patient with improved mobility today , less painful "catching" reported all around  Patients program modifications tolerated well  Plan: Cont per POC  Continue to strengthen posterior capsule, scapular mm  Monitor response and increase as tolerated

## 2019-03-07 ENCOUNTER — OFFICE VISIT (OUTPATIENT)
Dept: PHYSICAL THERAPY | Facility: CLINIC | Age: 49
End: 2019-03-07
Payer: COMMERCIAL

## 2019-03-07 DIAGNOSIS — M75.02 ADHESIVE CAPSULITIS OF LEFT SHOULDER: ICD-10-CM

## 2019-03-07 DIAGNOSIS — M75.22 BICEPS TENDINITIS OF LEFT SHOULDER: ICD-10-CM

## 2019-03-07 DIAGNOSIS — M75.52 SUBACROMIAL BURSITIS OF LEFT SHOULDER JOINT: Primary | ICD-10-CM

## 2019-03-07 PROCEDURE — 97140 MANUAL THERAPY 1/> REGIONS: CPT | Performed by: PHYSICAL THERAPIST

## 2019-03-07 PROCEDURE — 97110 THERAPEUTIC EXERCISES: CPT | Performed by: PHYSICAL THERAPIST

## 2019-03-07 PROCEDURE — 97530 THERAPEUTIC ACTIVITIES: CPT | Performed by: PHYSICAL THERAPIST

## 2019-03-07 NOTE — PROGRESS NOTES
Daily Note     Today's date: 3/7/2019  Patient name: Luis A Méndez  : 1970  MRN: [id number]  Referring provider: Lucia Jackman DO  Dx:   Encounter Diagnosis     ICD-10-CM    1  Subacromial bursitis of left shoulder joint M75 52    2  Biceps tendinitis of left shoulder M75 22    3  Adhesive capsulitis of left shoulder M75 02                   Subjective: Patient feels that the changes in exercises is helping her  She notes that she mustve slept on her shoulder wrong 2 nights ago, and that caused some discomfort and irritation  Notes today, her symptoms are less  VPS in general 1/10   Patient pleased with the (+) changes    Objective: See treatment diary below      Manual  2/14 2/ 18 2/22 2/25 3/1 3/4 3/7      Left shoulder AAROM MM db db JK         Left shoulder jt   mobs  db db  db db db      Left shoulder AA ext with overpressure     db db db                                    Exercise Diary  2/12 2/14 2/ 18 2/22 2/25 3/1 3/4 3/7     UBE   10' alt 10' 10' 10' 10' 10'     pulleys   5' 3' 4' 4' 4' 5'     Wall slides 10s x 10 10s x 10 10s x 10 10s x10  10sx 10 10s x 10 10s x 10 10s x 10     Back rolls/ scap retract 20x ea  20x ea 20 x 20x  20x 20x ea 20x ea 20x ea     IR Towel stretch 20s x 5 20s x 5 10s x 20 20s x 5 20sx5 20s x 5 20s x 5      Posterior capsule stretch 20s x 5 20s x 5  20s x 5 20s 5x        Repeated ext in stand - Indep and with cane OP      rodrick  20 x 2  ea 20x 2  2# 20x 2                  Supine cane flex/ cp  10"x 10 1 5# x 20  2# x 20  2# 20x dc       Serratus/ triceps  2x10 ea 2# x 20 2# x 20  2# 20x 2# x 20  3# x 20 4# x 20                   Supine AROM flex/ abd to 90  2x10 2# x push x 20 ea  2# x 20  2# x20 flex   2# x 20 ea     Prone 45, 90 x 2  45* only 10x 45/rows 10x 2   10x2  45 only x 20    2# x 20  45 only     S/l abd/ er        2# x 20      TB no monies, rows, lpd   rtb x 20 ea  rtb x 20  rtb 20x  rtb x 20  gtb x 20                   BOR, keshia curls     2# x 20 2# x20  3# x 20  4# x 20 ea                                                                                       Modalities   2/14 2/18 2/25         H-wave w/ mhp  High/ low Left shoulder 15'  15'  pd         mhp L    10' pd                          Assessment: patient with improved mobility today , Patient able to go thru AROM flexion/ abd to approximately 150 degrees without any catching - slight muscular shakiness observed due to weakness       Plan: Cont per POC  Progress scapular work

## 2019-03-12 ENCOUNTER — OFFICE VISIT (OUTPATIENT)
Dept: PHYSICAL THERAPY | Facility: CLINIC | Age: 49
End: 2019-03-12
Payer: COMMERCIAL

## 2019-03-12 DIAGNOSIS — M75.02 ADHESIVE CAPSULITIS OF LEFT SHOULDER: ICD-10-CM

## 2019-03-12 DIAGNOSIS — M75.22 BICEPS TENDINITIS OF LEFT SHOULDER: ICD-10-CM

## 2019-03-12 DIAGNOSIS — M75.52 SUBACROMIAL BURSITIS OF LEFT SHOULDER JOINT: Primary | ICD-10-CM

## 2019-03-12 PROCEDURE — 97530 THERAPEUTIC ACTIVITIES: CPT

## 2019-03-12 PROCEDURE — 97110 THERAPEUTIC EXERCISES: CPT

## 2019-03-12 NOTE — PROGRESS NOTES
Daily Note     Today's date: 3/12/2019  Patient name: Hilda Holcomb  : 1970  MRN: [id number]  Referring provider: Maria Elena Lacey DO  Dx:   Encounter Diagnosis     ICD-10-CM    1  Subacromial bursitis of left shoulder joint M75 52    2  Biceps tendinitis of left shoulder M75 22    3  Adhesive capsulitis of left shoulder M75 02          Subjective: Patient noted no new complaints  Objective: See treatment diary below      Assessment: Tolerated treatment fair  Held off on s/l abduction today due to increased pain in L shoulder  Patient would benefit from continued PT      Plan: Continue per plan of care         Manual  2/14 2/ 18 2/22 2/25 3/1 3/4 3/7  3/12       Left shoulder AAROM MM db db JK               Left shoulder jt   mobs   db db   db db db  np       Left shoulder AA ext with overpressure         db db db  np                                                             Exercise Diary  2/12 2/14 2/ 18 2/22 2/25 3/1 3/4 3/7  3/12     UBE     10' alt 10' 10' 10' 10' 10'  10'     pulleys     5' 3' 4' 4' 4' 5'  5'     Wall slides 10s x 10 10s x 10 10s x 10 10s x10  10sx 10 10s x 10 10s x 10 10s x 10  10s x 10     Back rolls/ scap retract 20x ea  20x ea 20 x 20x  20x 20x ea 20x ea 20x ea  20X EA      IR Towel stretch 20s x 5 20s x 5 10s x 20 20s x 5 20sx5 20s x 5 20s x 5         Posterior capsule stretch 20s x 5 20s x 5   20s x 5 20s 5x             Repeated ext in stand - Indep and with cane OP           rodrick  20 x 2  ea 20x 2  2# 20x 2  2# 20x 2                             Supine cane flex/ cp   10"x 10 1 5# x 20  2# x 20  2# 20x dc           Serratus/ triceps   2x10 ea 2# x 20 2# x 20  2# 20x 2# x 20  3# x 20 4# x 20   4#X  20                              Supine AROM flex/ abd to 90   2x10 2# x push x 20 ea  2# x 20  2# x20 flex     2# x 20 ea  2# x 20 ea     Prone 45, 90 x 2   45* only 10x 45/rows 10x 2    10x2   45 only x 20     2# x 20  45 only  2# x 20  45 only     S/l abd/ er               2# x 20   ER ONLY 20X2#  S/l ABD P!     TB no monies, rows, lpd     rtb x 20 ea  rtb x 20  rtb 20x   rtb x 20  gtb x 20   gtb 20x                             BOR, bicep curls        2# x 20 2# x20   3# x 20  4# x 20 ea  4# x 20 ea                                                                                                                                                           Modalities    2/14 2/18 2/25               H-wave w/ mhp  High/ low Left shoulder 15'  15'   pd               mhp L      10' pd

## 2019-03-15 ENCOUNTER — APPOINTMENT (OUTPATIENT)
Dept: PHYSICAL THERAPY | Facility: CLINIC | Age: 49
End: 2019-03-15
Payer: COMMERCIAL

## 2019-03-18 ENCOUNTER — OFFICE VISIT (OUTPATIENT)
Dept: PHYSICAL THERAPY | Facility: CLINIC | Age: 49
End: 2019-03-18
Payer: COMMERCIAL

## 2019-03-18 DIAGNOSIS — M75.52 SUBACROMIAL BURSITIS OF LEFT SHOULDER JOINT: ICD-10-CM

## 2019-03-18 DIAGNOSIS — M75.22 BICEPS TENDINITIS OF LEFT SHOULDER: Primary | ICD-10-CM

## 2019-03-18 DIAGNOSIS — M75.02 ADHESIVE CAPSULITIS OF LEFT SHOULDER: ICD-10-CM

## 2019-03-18 PROCEDURE — 97140 MANUAL THERAPY 1/> REGIONS: CPT | Performed by: PHYSICAL THERAPIST

## 2019-03-18 PROCEDURE — 97530 THERAPEUTIC ACTIVITIES: CPT | Performed by: PHYSICAL THERAPIST

## 2019-03-18 PROCEDURE — 97110 THERAPEUTIC EXERCISES: CPT | Performed by: PHYSICAL THERAPIST

## 2019-03-18 NOTE — PROGRESS NOTES
Daily Note     Today's date: 3/18/2019  Patient name: Adeline Shabazz  : 1970  MRN: [id number]  Referring provider: Óscar Ramsey DO  Dx:   Encounter Diagnosis     ICD-10-CM    1  Biceps tendinitis of left shoulder M75 22    2  Subacromial bursitis of left shoulder joint M75 52    3  Adhesive capsulitis of left shoulder M75 02          Subjective: Patient pleased to report that she is able to move her hand behind her now without issue  She is pleased to have less pain and improving movement and function  Objective: See treatment diary below      Assessment: tolerated session better today-  Patient able to progress without catching nor flare up of symptoms reported  Plan: Continue per plan of care  Monitor pain levels each session   Increase upright activities as able         Manual  2/14 2/ 18 2/22 2/25 3/1 3/4 3/7  3/12  3/19     Left shoulder AAROM MM db db JK               Left shoulder jt   mobs   db db   db db db  np  db     Left shoulder AA ext with overpressure         db db db  np  db                                                           Exercise Diary  2/12 2/14 2/ 18 2/22 2/25 3/1 3/4 3/7  3/12  3/19   UBE     10' alt 10' 10' 10' 10' 10'  10'  10'   pulleys     5' 3' 4' 4' 4' 5'  5'  4'   Wall slides 10s x 10 10s x 10 10s x 10 10s x10  10sx 10 10s x 10 10s x 10 10s x 10  10s x 10  10s x 10   Back rolls/ scap retract 20x ea  20x ea 20 x 20x  20x 20x ea 20x ea 20x ea  20X EA   20x   IR Towel stretch 20s x 5 20s x 5 10s x 20 20s x 5 20sx5 20s x 5 20s x 5      20s x 5   Posterior capsule stretch 20s x 5 20s x 5   20s x 5 20s 5x             Repeated ext in stand - Indep and with cane OP           rodrick  20 x 2  ea 20x 2  2# 20x 2  2# 20x 2  2 5# x 20                           Supine cane flex/ cp   10"x 10 1 5# x 20  2# x 20  2# 20x dc           Serratus/ triceps   2x10 ea 2# x 20 2# x 20  2# 20x 2# x 20  3# x 20 4# x 20   4#X  20   4# x 20   Supine AROM flex/ abd to nikki    2x10 2# x push x 20 ea  2# x 20  2# x20 flex     2# x 20 ea  2# x 20 ea  2# x 20    Prone 45, 90 x 2   45* only 10x 45/rows 10x 2    10x2   45 only x 20     2# x 20  45 only  2# x 20  45 only  0# @ 90 x 20 ea  2# @ 45 x 20    S/l abd/ er               2# x 20   ER ONLY 20X2#  S/l ABD P! 2# x 20    TB no monies, rows, lpd     rtb x 20 ea  rtb x 20  rtb 20x   rtb x 20  gtb x 20   gtb 20x  gtb x 20                           BOR, bicep curls        2# x 20 2# x20   3# x 20  4# x 20 ea  4# x 20 ea  4# x 20     stand flex/ abd to 90                    0# x 20 ea                                                                                                                                 Modalities    2/14 2/18 2/25               H-wave w/ mhp  High/ low Left shoulder 15'  15'   pd               mhp L      10' pd

## 2019-03-21 ENCOUNTER — OFFICE VISIT (OUTPATIENT)
Dept: INTERNAL MEDICINE CLINIC | Facility: CLINIC | Age: 49
End: 2019-03-21
Payer: COMMERCIAL

## 2019-03-21 ENCOUNTER — OFFICE VISIT (OUTPATIENT)
Dept: PHYSICAL THERAPY | Facility: CLINIC | Age: 49
End: 2019-03-21
Payer: COMMERCIAL

## 2019-03-21 VITALS
BODY MASS INDEX: 28.56 KG/M2 | HEIGHT: 65 IN | DIASTOLIC BLOOD PRESSURE: 68 MMHG | HEART RATE: 68 BPM | RESPIRATION RATE: 16 BRPM | SYSTOLIC BLOOD PRESSURE: 122 MMHG | WEIGHT: 171.4 LBS

## 2019-03-21 DIAGNOSIS — M75.02 ADHESIVE CAPSULITIS OF LEFT SHOULDER: ICD-10-CM

## 2019-03-21 DIAGNOSIS — G89.29 CHRONIC PAIN OF RIGHT KNEE: ICD-10-CM

## 2019-03-21 DIAGNOSIS — M54.50 LEFT-SIDED LOW BACK PAIN WITHOUT SCIATICA, UNSPECIFIED CHRONICITY: ICD-10-CM

## 2019-03-21 DIAGNOSIS — M75.52 SUBACROMIAL BURSITIS OF LEFT SHOULDER JOINT: ICD-10-CM

## 2019-03-21 DIAGNOSIS — J45.909 UNCOMPLICATED ASTHMA, UNSPECIFIED ASTHMA SEVERITY, UNSPECIFIED WHETHER PERSISTENT: ICD-10-CM

## 2019-03-21 DIAGNOSIS — I10 ESSENTIAL HYPERTENSION: Primary | ICD-10-CM

## 2019-03-21 DIAGNOSIS — M25.561 CHRONIC PAIN OF RIGHT KNEE: ICD-10-CM

## 2019-03-21 DIAGNOSIS — F41.9 ANXIETY: ICD-10-CM

## 2019-03-21 DIAGNOSIS — M75.22 BICEPS TENDINITIS OF LEFT SHOULDER: Primary | ICD-10-CM

## 2019-03-21 PROCEDURE — 3074F SYST BP LT 130 MM HG: CPT | Performed by: INTERNAL MEDICINE

## 2019-03-21 PROCEDURE — 97110 THERAPEUTIC EXERCISES: CPT

## 2019-03-21 PROCEDURE — 3078F DIAST BP <80 MM HG: CPT | Performed by: INTERNAL MEDICINE

## 2019-03-21 PROCEDURE — G8990 OTHER PT/OT CURRENT STATUS: HCPCS

## 2019-03-21 PROCEDURE — G8991 OTHER PT/OT GOAL STATUS: HCPCS

## 2019-03-21 PROCEDURE — 97530 THERAPEUTIC ACTIVITIES: CPT

## 2019-03-21 PROCEDURE — 99214 OFFICE O/P EST MOD 30 MIN: CPT | Performed by: INTERNAL MEDICINE

## 2019-03-21 RX ORDER — PROPRANOLOL/HYDROCHLOROTHIAZID 40 MG-25MG
TABLET ORAL 2 TIMES DAILY
COMMUNITY

## 2019-03-21 RX ORDER — IPRATROPIUM BROMIDE AND ALBUTEROL SULFATE 2.5; .5 MG/3ML; MG/3ML
3 SOLUTION RESPIRATORY (INHALATION) EVERY 4 HOURS PRN
Qty: 120 VIAL | Refills: 3 | Status: SHIPPED | OUTPATIENT
Start: 2019-03-21 | End: 2022-04-25 | Stop reason: SDUPTHER

## 2019-03-21 NOTE — ASSESSMENT & PLAN NOTE
BP remains stable w/o medication, continue with exercise and dietary modification with continue weight loss as able

## 2019-03-21 NOTE — PATIENT INSTRUCTIONS
Blood pressure appears stable without medication, continue monitor home blood pressures, continue with exercise and dietary modification with weight loss as able  Low back pain appears to be related to bowel habits, monitor symptoms closely, keep symptom diary, contact me if symptoms worsen or pattern changes    Knee pain with abnormal bony prominence-check x-rays, follow accordingly, discuss with Orthopedics next week    Shoulder pain improving under care of Orthopedics    Routine follow-up after labs in 6 months, sooner as needed

## 2019-03-21 NOTE — PROGRESS NOTES
Daily Note     Today's date: 3/21/2019  Patient name: Adeline Shabazz  : 1970  MRN: [id number]  Referring provider: Óscar Ramsey DO  Dx:   Encounter Diagnosis     ICD-10-CM    1  Biceps tendinitis of left shoulder M75 22    2  Subacromial bursitis of left shoulder joint M75 52    3  Adhesive capsulitis of left shoulder M75 02        Subjective: Patient reports that she is sore today secondary to " I pushed it on Tuesday at therapy"  Otherwise, she is glad to see her slow but sure improvements  Objective: See treatment diary below    Assessment: Patient continues to progress well and has been noticing improvement each week  Increased foto score since IE  Patient continues to guard during PROM and benefits more from self stretching to accomplish her goals  Verbal cueing t/o to improve form and isolate proper musculature  Plan: Continue per plan of care  Monitor pain levels each session   Increase upright activities as able  Manual  3/21  2/22 2/25 3/1 3/4 3/7  3/12  3/19     Left shoulder AAROM   db JK               Left shoulder jt   mobs   db   db db db  np  db     Left shoulder AA ext with overpressure         db db db  np  db                                                         Exercise Diary  3/21  2/ 18 2/22 2/25 3/1 3/4 3/7  3/12  3/19   UBE 10'  10' alt 10' 10' 10' 10' 10'  10'  10'   pulleys 4'  5' 3' 4' 4' 4' 5'  5'  4'   Wall slides 10" x10  10s x 10 10s x10  10sx 10 10s x 10 10s x 10 10s x 10  10s x 10  10s x 10   Back rolls/ scap retract 20x  20 x 20x  20x 20x ea 20x ea 20x ea  20X EA   20x   IR Towel stretch 20"  x5  10s x 20 20s x 5 20sx5 20s x 5 20s x 5      20s x 5   Posterior capsule stretch     20s x 5 20s 5x             Repeated ext in stand - Indep and with cane OP  2 5#  x20         rodrick  20 x 2  ea 20x 2  2# 20x 2  2# 20x 2  2 5# x 20                           Supine cane flex/ cp   1 5# x 20  2# x 20  2# 20x dc           Serratus/ triceps 4#  x20  2# x 20 2# x 20 2# 20x 2# x 20  3# x 20 4# x 20   4#X  20   4# x 20   Supine AROM flex/ abd to nikki   2# x20  2# x push x 20 ea  2# x 20  2# x20 flex     2# x 20 ea  2# x 20 ea  2# x 20    Prone 45, 90 x 2  0# @ 90 x 20 ea  2# @ 45 x 20   45/rows 10x 2    10x2   45 only x 20     2# x 20  45 only  2# x 20  45 only  0# @ 90 x 20 ea  2# @ 45 x 20    S/l abd/ er  2#  x20             2# x 20   ER ONLY 20X2#  S/l ABD P! 2# x 20    TB no monies, rows, lpd  gtb  x20   rtb x 20 ea  rtb x 20  rtb 20x   rtb x 20  gtb x 20   gtb 20x  gtb x 20                           BOR, bicep curls   4#  x20     2# x 20 2# x20   3# x 20  4# x 20 ea  4# x 20 ea  4# x 20     stand flex/ abd to 90  0#  x20 ea                  0# x 20 ea                                                                                                                               Modalities    2/18 2/25               H-wave w/ mhp  High/ low Left shoulder     pd               mhp L      10' pd

## 2019-03-21 NOTE — PROGRESS NOTES
Assessment/Plan:    Anxiety  Stable w/ rare use of propranolol prior to giving a presentation    Essential hypertension  BP remains stable w/o medication, continue with exercise and dietary modification with continue weight loss as able  Asthma  Stable-refill DuoNeb, continue antihistamines, nasal steroids and albuterol inhaler as needed       Diagnoses and all orders for this visit:    Essential hypertension    Uncomplicated asthma, unspecified asthma severity, unspecified whether persistent  -     ipratropium-albuterol (DUO-NEB) 0 5-2 5 mg/3 mL nebulizer solution; Take 1 vial (3 mL total) by nebulization every 4 (four) hours as needed for shortness of breath    Anxiety    Adhesive capsulitis of left shoulder    Chronic pain of right knee    Left-sided low back pain without sciatica, unspecified chronicity    Other orders  -     Turmeric 500 MG CAPS; Take by mouth 2 (two) times a day         Patient Instructions   Blood pressure appears stable without medication, continue monitor home blood pressures, continue with exercise and dietary modification with weight loss as able  Low back pain appears to be related to bowel habits, monitor symptoms closely, keep symptom diary, contact me if symptoms worsen or pattern changes    Knee pain with abnormal bony prominence-check x-rays, follow accordingly, discuss with Orthopedics next week    Shoulder pain improving under care of Orthopedics          Subjective:      Patient ID: Clarice Dunn is a 46 y o  female    Recovering from L shoulder tendonopathy, 75% improved w/ PT after steroids, f/u next week  Notes worsening bony prominence R knee  Worse w/ kneeling and when she starts on elliptical, but w/ continued exercise it improves  Notes an achy pressure in L low back that seems to improve w/ passage of stool or w/ getting up and moving around  Colonoscopy done in Jan w/ one hyperplastic polyp  Just got back to exercise d/t shoulder pain     HTN-home bp's 130-140's70-80's over past 2 wks  Current Outpatient Medications:     albuterol (PROVENTIL HFA) 90 mcg/act inhaler, Inhale 1-2 puffs, Disp: , Rfl:     Cholecalciferol (VITAMIN D3) 2000 units capsule, Take by mouth daily, Disp: , Rfl:     fexofenadine (ALLEGRA ALLERGY) 60 MG tablet, Take 60 mg by mouth as needed  , Disp: , Rfl:     fluticasone (FLONASE) 50 mcg/act nasal spray, 2 sprays into each nostril as needed  , Disp: , Rfl:     ipratropium-albuterol (DUO-NEB) 0 5-2 5 mg/3 mL nebulizer solution, Take 1 vial (3 mL total) by nebulization every 4 (four) hours as needed for shortness of breath, Disp: 120 vial, Rfl: 3    propranolol (INDERAL) 10 mg tablet, Take 1 tablet by mouth 2 (two) times a day as needed, Disp: , Rfl:     Turmeric 500 MG CAPS, Take by mouth 2 (two) times a day, Disp: , Rfl:     No results found for this or any previous visit (from the past 1008 hour(s))  The following portions of the patient's history were reviewed and updated as appropriate: allergies, current medications, past family history, past medical history, past social history, past surgical history and problem list      Review of Systems   Constitutional: Negative for appetite change, chills, diaphoresis, fatigue, fever and unexpected weight change  HENT: Negative for congestion, hearing loss and rhinorrhea  Eyes: Negative for visual disturbance  Respiratory: Negative for cough, chest tightness, shortness of breath and wheezing  Cardiovascular: Negative for chest pain, palpitations and leg swelling  Gastrointestinal: Negative for abdominal pain and blood in stool  Endocrine: Negative for cold intolerance, heat intolerance, polydipsia and polyuria  Genitourinary: Negative for difficulty urinating, dysuria, frequency and urgency  Musculoskeletal: Positive for arthralgias  Negative for myalgias  Skin: Negative for rash  Neurological: Negative for dizziness, weakness, light-headedness and headaches  Hematological: Does not bruise/bleed easily  Psychiatric/Behavioral: Negative for dysphoric mood and sleep disturbance  Objective:      Vitals:    03/21/19 1013   BP: 122/68   Pulse:    Resp:           Physical Exam   Constitutional: She is oriented to person, place, and time  She appears well-developed and well-nourished  HENT:   Head: Normocephalic and atraumatic  Nose: Nose normal    Mouth/Throat: Oropharynx is clear and moist    Eyes: Pupils are equal, round, and reactive to light  Conjunctivae and EOM are normal  No scleral icterus  Neck: Normal range of motion  Neck supple  No JVD present  No tracheal deviation present  No thyromegaly present  Cardiovascular: Normal rate, regular rhythm and intact distal pulses  Exam reveals no gallop and no friction rub  No murmur heard  Pulmonary/Chest: Effort normal and breath sounds normal  No respiratory distress  She has no wheezes  She has no rales  Abdominal: Soft  Bowel sounds are normal  She exhibits no distension and no mass  There is no tenderness  There is no rebound and no guarding  Musculoskeletal: She exhibits no edema or tenderness  1 cm bony prominence of R tibial tuberosity   Lymphadenopathy:     She has no cervical adenopathy  Neurological: She is alert and oriented to person, place, and time  No cranial nerve deficit  Skin: Skin is warm and dry  No rash noted  No erythema  Psychiatric: She has a normal mood and affect   Her behavior is normal  Judgment and thought content normal

## 2019-03-27 ENCOUNTER — OFFICE VISIT (OUTPATIENT)
Dept: PHYSICAL THERAPY | Facility: CLINIC | Age: 49
End: 2019-03-27
Payer: COMMERCIAL

## 2019-03-27 DIAGNOSIS — M75.02 ADHESIVE CAPSULITIS OF LEFT SHOULDER: ICD-10-CM

## 2019-03-27 DIAGNOSIS — M75.22 BICEPS TENDINITIS OF LEFT SHOULDER: Primary | ICD-10-CM

## 2019-03-27 DIAGNOSIS — M75.52 SUBACROMIAL BURSITIS OF LEFT SHOULDER JOINT: ICD-10-CM

## 2019-03-27 PROCEDURE — 97110 THERAPEUTIC EXERCISES: CPT | Performed by: PHYSICAL THERAPIST

## 2019-03-27 NOTE — PROGRESS NOTES
Daily Note     Today's date: 3/27/2019  Patient name: Adeline Shabazz  : 1970  MRN: [id number]  Referring provider: Óscar Ramsey DO  Dx:   Encounter Diagnosis     ICD-10-CM    1  Biceps tendinitis of left shoulder M75 22    2  Adhesive capsulitis of left shoulder M75 02    3  Subacromial bursitis of left shoulder joint M75 52        Subjective: Patient emotional today at end of session as her shoulder is so fatigued and shakey with activities  She does admit that it is feeling much better than it had , but still frustrated with the pain at times  Objective: See treatment diary below    Assessment: Patient tolerated session fair overall -   No catching demonstrated today- able to progress better  Plan: Continue per plan of care  Monitor pain levels each session   Reassess next session for MD appointment next week  Manual  3/21  2/22 2/25 3/1 3/4 3/7  3/12  3/19     Left shoulder AAROM   db JK               Left shoulder jt  mobs   db   db db db  np  db     Left shoulder AA ext with overpressure         db db db  np  db                                                         Exercise Diary  3/21 3 27 2/ 18 2/22 2/25 3/1 3/4 3/7  3/12  3/19   UBE 10' 10'  10' alt 10' 10' 10' 10' 10'  10'  10'   pulleys 4' 4' 5' 3' 4' 4' 4' 5'  5'  4'   Wall slides 10" x10  10s x 10 10s x10  10sx 10 10s x 10 10s x 10 10s x 10  10s x 10  10s x 10   Back rolls/ scap retract 20x 20x  20 x 20x  20x 20x ea 20x ea 20x ea  20X EA   20x   IR Towel stretch 20"  x5 20s x 5 10s x 20 20s x 5 20sx5 20s x 5 20s x 5      20s x 5   Posterior capsule stretch     20s x 5 20s 5x             Repeated ext in stand - Indep and with cane OP  2 5#  x20  0# x 20        rodrick  20 x 2  ea 20x 2  2# 20x 2  2# 20x 2  2 5# x 20   Supine cane flex/ cp   1 5# x 20  2# x 20  2# 20x dc           Serratus/ triceps 4#  x20 5# x 30 2# x 20 2# x 20  2# 20x 2# x 20  3# x 20 4# x 20   4#X  20   4# x 20   Supine AROM flex/ abd to nikki   2# x20 2# x 30 2# x push x 20 ea  2# x 20  2# x20 flex     2# x 20 ea  2# x 20 ea  2# x 20    Prone 45, 90 x 2  0# @ 90 x 20 ea  2# @ 45 x 20  2# rows & 45, 0# abd 45/rows 10x 2    10x2   45 only x 20     2# x 20  45 only  2# x 20  45 only  0# @ 90 x 20 ea  2# @ 45 x 20    S/l abd/ er  2#  x20  2# er x 20 0# abd x 20           2# x 20   ER ONLY 20X2#  S/l ABD P! 2# x 20    TB no monies, rows, lpd  gtb  x20   rtb x 20 ea  rtb x 20  rtb 20x   rtb x 20  gtb x 20   gtb 20x  gtb x 20                           BOR, bicep curls, kickbacks   4#  x20  4# x 20   2# x 20 2# x20   3# x 20  4# x 20 ea  4# x 20 ea  4# x 20     stand flex/ abd to 90  0#  x20 ea 0-2# x 20 ea                0# x 20 ea                                                    ball rolls @ wall   grb x 20 ea                                                                       Modalities    2/18 2/25               H-wave w/ mhp  High/ low Left shoulder     pd               mhp L      10' pd

## 2019-03-29 ENCOUNTER — OFFICE VISIT (OUTPATIENT)
Dept: PHYSICAL THERAPY | Facility: CLINIC | Age: 49
End: 2019-03-29
Payer: COMMERCIAL

## 2019-03-29 DIAGNOSIS — M75.22 BICEPS TENDINITIS OF LEFT SHOULDER: Primary | ICD-10-CM

## 2019-03-29 DIAGNOSIS — M75.02 ADHESIVE CAPSULITIS OF LEFT SHOULDER: ICD-10-CM

## 2019-03-29 DIAGNOSIS — M75.52 SUBACROMIAL BURSITIS OF LEFT SHOULDER JOINT: ICD-10-CM

## 2019-03-29 PROCEDURE — 97530 THERAPEUTIC ACTIVITIES: CPT

## 2019-03-29 PROCEDURE — 97110 THERAPEUTIC EXERCISES: CPT

## 2019-03-29 PROCEDURE — 97140 MANUAL THERAPY 1/> REGIONS: CPT

## 2019-03-29 NOTE — PROGRESS NOTES
Daily Note     Today's date: 3/29/2019  Patient name: Karen Guthrie  : 1970  MRN: [id number]  Referring provider: Mira Rodriguez DO  Dx:   Encounter Diagnosis     ICD-10-CM    1  Biceps tendinitis of left shoulder M75 22    2  Adhesive capsulitis of left shoulder M75 02    3  Subacromial bursitis of left shoulder joint M75 52                   Subjective: Upon presentaiton, SPR =2/10, with PROM SPR increased to 3-4/10  Objective: See treatment diary below  Manual  3/21 3/29 2/22 2/25 3/1 3/4 3/7  3/12  3/19     Left shoulder AAROM   LA  PTA db JK               Left shoulder jt  mobs     db   db db db  np  db     Left shoulder AA ext with overpressure         db db db  np  db                                                         Exercise Diary  3/21 3 27 3/29 2/22 2/25 3/1 3/4 3/7  3/12  3/19   UBE 10' 10'  10' alt 10' 10' 10' 10' 10'  10'  10'   pulleys 4' 4' 4' 3' 4' 4' 4' 5'  5'  4'   Wall slides 10" x10   :10   10x 10s x10  10sx 10 10s x 10 10s x 10 10s x 10  10s x 10  10s x 10   Back rolls/ scap retract 20x 20x  20 x  each 20x  20x 20x ea 20x ea 20x ea  20X EA   20x   IR Towel stretch 20"  x5 20s x 5 :20  5x 20s x 5 20sx5 20s x 5 20s x 5      20s x 5   Posterior capsule stretch     :HEP 20s x 5 20s 5x             Repeated ext in stand - Indep and with cane OP  2 5#  x20  0# x 20  20x     rodrick  20 x 2  ea 20x 2  2# 20x 2  2# 20x 2  2 5# x 20   Supine cane flex/ cp      2# x 20  2# 20x dc           Serratus/ triceps 4#  x20 5# x 30 5# x 20 2# x 20  2# 20x 2# x 20  3# x 20 4# x 20   4#X  20   4# x 20   Supine AROM flex/ abd to nikki   2# x20 2# x 30 2#   30x each  2# x 20  0  3 abd   2# x20 flex     2# x 20 ea  2# x 20 ea  2# x 20    Prone 45, 90 x 2  0# @ 90 x 20 ea  2# @ 45 x 20  2# rows & 45, 0# abd 2#  rows&45  0# abd   10x2   45 only x 20     2# x 20  45 only  2# x 20  45 only  0# @ 90 x 20 ea  2# @ 45 x 20    S/l abd/ er  2#  x20  2# er x 20 0# abd x 20 2#  ER  0# abd  20x each         2# x 20   ER ONLY 20X2#  S/l ABD P! 2# x 20    TB no monies, rows, lpd  gtb  x20   GTB x 20 each  rtb x 20  rtb 20x   rtb x 20  gtb x 20   gtb 20x  gtb x 20                           BOR, bicep curls, kickbacks   4#  x20  4# x 20 4#  20x 2# x 20 2# x20   3# x 20  4# x 20 ea  4# x 20 ea  4# x 20     stand flex/ abd to 90  0#  x20 ea 0-2# x 20 ea 1#  20x each              0# x 20 ea                                                    ball rolls @ wall   grb x 20 ea Green  20x each                                                                     Modalities      2/18 2/25               H-wave w/ mhp  High/ low Left shoulder       pd               mhp L      10' pd                                                           Assessment: Tolerated treatment well  Patient demonstrated fatigue post treatment, exhibited good technique with therapeutic exercises and would benefit from continued PT      Plan: Continue per plan of care  Progress treatment as tolerated

## 2019-04-01 ENCOUNTER — OFFICE VISIT (OUTPATIENT)
Dept: OBGYN CLINIC | Facility: CLINIC | Age: 49
End: 2019-04-01
Payer: COMMERCIAL

## 2019-04-01 VITALS
BODY MASS INDEX: 28.49 KG/M2 | HEIGHT: 65 IN | WEIGHT: 171 LBS | SYSTOLIC BLOOD PRESSURE: 143 MMHG | DIASTOLIC BLOOD PRESSURE: 83 MMHG | HEART RATE: 67 BPM

## 2019-04-01 DIAGNOSIS — R29.898 SHOULDER WEAKNESS: ICD-10-CM

## 2019-04-01 DIAGNOSIS — G89.29 CHRONIC LEFT SHOULDER PAIN: Primary | ICD-10-CM

## 2019-04-01 DIAGNOSIS — M25.512 CHRONIC LEFT SHOULDER PAIN: Primary | ICD-10-CM

## 2019-04-01 PROCEDURE — 99213 OFFICE O/P EST LOW 20 MIN: CPT | Performed by: FAMILY MEDICINE

## 2019-04-09 ENCOUNTER — HOSPITAL ENCOUNTER (OUTPATIENT)
Dept: MRI IMAGING | Facility: HOSPITAL | Age: 49
Discharge: HOME | End: 2019-04-09
Attending: FAMILY MEDICINE
Payer: COMMERCIAL

## 2019-04-09 DIAGNOSIS — R29.898 SHOULDER WEAKNESS: ICD-10-CM

## 2019-04-09 DIAGNOSIS — G89.29 CHRONIC LEFT SHOULDER PAIN: ICD-10-CM

## 2019-04-09 DIAGNOSIS — M25.512 CHRONIC LEFT SHOULDER PAIN: ICD-10-CM

## 2019-04-09 PROCEDURE — 73221 MRI JOINT UPR EXTREM W/O DYE: CPT

## 2019-04-09 NOTE — PROGRESS NOTES
PT Discharge    Today's date: 2019  Patient name: Cherelle Moreno  : 1970 MRN: [id number]  Referring provider: Yi Medrano DO  Dx:   Encounter Diagnosis     ICD-10-CM    1  Biceps tendinitis of left shoulder M75 22    2  Adhesive capsulitis of left shoulder M75 02    3  Subacromial bursitis of left shoulder joint M75 52          Assessment  Assessment details: Patient was seen at orthopedic MD and was referred for MRI due to continued pain and little change with PT overall  Impairments: abnormal or restricted ROM, activity intolerance, impaired physical strength and pain with function  Understanding of Dx/Px/POC: good  Goals  STG   1  Patient will demonstrate independence and competence with HEP 2 -4 weeks   MET to date  2  Patient will report > 25-50% reduced pain 2-4 weeks   Partially MET    LTG   1  Patient will report improvements with both functional and recreational abilities  4-6 weeks   Partially MET  2  Patient will demonstrate improved motor function  4-6 weeks     Partially MET    Plan  Plan details: Discharge skilled PT at this time    Thank you for this referral    Planned therapy interventions: home exercise program  Frequency: 2x week        Subjective Evaluation    Pain  Current pain ratin  At best pain ratin  At worst pain rating: 3  Quality: dull ache  Relieving factors: heat and medications  Aggravating factors: overhead activity  Progression: improved    Social Support  Lives with: spouse    Employment status: working ([employer])  Hand dominance: right      Diagnostic Tests  X-ray: abnormal  Patient Goals  Patient goals for therapy: increased motion, independence with ADLs/IADLs, return to sport/leisure activities and decreased pain          Objective     Postural Observations  Seated posture: fair  Standing posture: fair  Correction of posture: has no consistent effect    Additional Postural Observation Details  Improved correction and self awareness  Observations     Additional Observation Details  Unremarkable     Palpation   Left   Tenderness of the supraspinatus  Tenderness     Left Shoulder   Tenderness in the [facility] joint, acromion, biceps tendon (proximal) and coracoid process  No tenderness in the supraspinatus tendon  Cervical/Thoracic Screen   Cervical range of motion within normal limits    Neurological Testing     Sensation     Shoulder   Left Shoulder   Intact: light touch    Right Shoulder   Intact: light touch    Active Range of Motion   Left Shoulder   Flexion: 155 degrees with pain  Abduction: 145 degrees with pain  External rotation 90°: with pain  Internal rotation 90°: with pain    Right Shoulder   Normal active range of motion    Passive Range of Motion   Left Shoulder   Flexion: 165 degrees with pain  Abduction: 160 degrees with pain  External rotation 90°: with pain  Internal rotation 90°: WFL    Strength/Myotome Testing     Left Shoulder     Planes of Motion   Flexion: 4-   Extension: 4   Abduction: 4-   Adduction: 4   External rotation at 0°: 4-   Internal rotation at 0°: 4     Isolated Muscles   Biceps: 4+   Upper trapezius: 4     Right Shoulder   Normal muscle strength    Tests     Left Shoulder   Positive empty can, Hawkin's and Neer's  Negative drop arm         Flowsheet Rows      Most Recent Value   PT/OT G-Codes   Current Score  53   Projected Score  68          Precautions: Left shoulder Pain , calcific tendonitis

## 2019-04-18 ENCOUNTER — OFFICE VISIT (OUTPATIENT)
Dept: OBGYN CLINIC | Facility: CLINIC | Age: 49
End: 2019-04-18
Payer: COMMERCIAL

## 2019-04-18 VITALS
HEIGHT: 65 IN | HEART RATE: 78 BPM | WEIGHT: 173 LBS | DIASTOLIC BLOOD PRESSURE: 78 MMHG | SYSTOLIC BLOOD PRESSURE: 133 MMHG | BODY MASS INDEX: 28.82 KG/M2

## 2019-04-18 DIAGNOSIS — M75.42 SUBACROMIAL IMPINGEMENT OF LEFT SHOULDER: ICD-10-CM

## 2019-04-18 DIAGNOSIS — M75.32 CALCIFIC TENDINITIS OF LEFT SHOULDER: Primary | ICD-10-CM

## 2019-04-18 DIAGNOSIS — M75.52 SUBACROMIAL BURSITIS OF LEFT SHOULDER JOINT: ICD-10-CM

## 2019-04-18 DIAGNOSIS — M25.612 SHOULDER STIFFNESS, LEFT: ICD-10-CM

## 2019-04-18 PROCEDURE — 99213 OFFICE O/P EST LOW 20 MIN: CPT | Performed by: FAMILY MEDICINE

## 2019-05-06 ENCOUNTER — EVALUATION (OUTPATIENT)
Dept: PHYSICAL THERAPY | Facility: CLINIC | Age: 49
End: 2019-05-06
Payer: COMMERCIAL

## 2019-05-06 DIAGNOSIS — M75.42 SUBACROMIAL IMPINGEMENT OF LEFT SHOULDER: ICD-10-CM

## 2019-05-06 DIAGNOSIS — M25.612 SHOULDER STIFFNESS, LEFT: ICD-10-CM

## 2019-05-06 DIAGNOSIS — M75.32 CALCIFIC TENDINITIS OF LEFT SHOULDER: Primary | ICD-10-CM

## 2019-05-06 DIAGNOSIS — M75.52 SUBACROMIAL BURSITIS OF LEFT SHOULDER JOINT: ICD-10-CM

## 2019-05-06 PROCEDURE — 97035 APP MDLTY 1+ULTRASOUND EA 15: CPT | Performed by: PHYSICAL THERAPIST

## 2019-05-06 PROCEDURE — G8990 OTHER PT/OT CURRENT STATUS: HCPCS | Performed by: PHYSICAL THERAPIST

## 2019-05-06 PROCEDURE — 97161 PT EVAL LOW COMPLEX 20 MIN: CPT | Performed by: PHYSICAL THERAPIST

## 2019-05-06 PROCEDURE — G8991 OTHER PT/OT GOAL STATUS: HCPCS | Performed by: PHYSICAL THERAPIST

## 2019-05-13 DIAGNOSIS — J45.20 MILD INTERMITTENT ASTHMA WITHOUT COMPLICATION: Primary | ICD-10-CM

## 2019-05-13 RX ORDER — ALBUTEROL SULFATE 90 UG/1
1-2 AEROSOL, METERED RESPIRATORY (INHALATION) EVERY 4 HOURS PRN
Qty: 1 INHALER | Refills: 1 | Status: SHIPPED | OUTPATIENT
Start: 2019-05-13 | End: 2019-06-12

## 2019-05-15 ENCOUNTER — APPOINTMENT (OUTPATIENT)
Dept: PHYSICAL THERAPY | Facility: CLINIC | Age: 49
End: 2019-05-15
Payer: COMMERCIAL

## 2019-05-17 ENCOUNTER — APPOINTMENT (OUTPATIENT)
Dept: PHYSICAL THERAPY | Facility: CLINIC | Age: 49
End: 2019-05-17
Payer: COMMERCIAL

## 2019-05-22 ENCOUNTER — APPOINTMENT (OUTPATIENT)
Dept: PHYSICAL THERAPY | Facility: CLINIC | Age: 49
End: 2019-05-22
Payer: COMMERCIAL

## 2019-05-24 ENCOUNTER — OFFICE VISIT (OUTPATIENT)
Dept: INTERNAL MEDICINE CLINIC | Facility: CLINIC | Age: 49
End: 2019-05-24
Payer: COMMERCIAL

## 2019-05-24 ENCOUNTER — APPOINTMENT (OUTPATIENT)
Dept: RADIOLOGY | Facility: CLINIC | Age: 49
End: 2019-05-24
Payer: COMMERCIAL

## 2019-05-24 ENCOUNTER — TELEPHONE (OUTPATIENT)
Dept: INTERNAL MEDICINE CLINIC | Facility: CLINIC | Age: 49
End: 2019-05-24

## 2019-05-24 VITALS
BODY MASS INDEX: 28.46 KG/M2 | HEIGHT: 65 IN | SYSTOLIC BLOOD PRESSURE: 136 MMHG | HEART RATE: 80 BPM | DIASTOLIC BLOOD PRESSURE: 84 MMHG | RESPIRATION RATE: 12 BRPM | WEIGHT: 170.8 LBS

## 2019-05-24 DIAGNOSIS — L03.116 CELLULITIS OF LEFT LOWER EXTREMITY: Primary | ICD-10-CM

## 2019-05-24 DIAGNOSIS — L03.116 CELLULITIS OF LEFT LOWER EXTREMITY: ICD-10-CM

## 2019-05-24 PROCEDURE — 3008F BODY MASS INDEX DOCD: CPT | Performed by: INTERNAL MEDICINE

## 2019-05-24 PROCEDURE — 99213 OFFICE O/P EST LOW 20 MIN: CPT | Performed by: INTERNAL MEDICINE

## 2019-05-24 PROCEDURE — 73630 X-RAY EXAM OF FOOT: CPT

## 2019-05-24 RX ORDER — DOXYCYCLINE HYCLATE 100 MG/1
100 CAPSULE ORAL EVERY 12 HOURS SCHEDULED
Qty: 14 CAPSULE | Refills: 0 | Status: SHIPPED | OUTPATIENT
Start: 2019-05-24 | End: 2019-05-31

## 2019-05-29 ENCOUNTER — OFFICE VISIT (OUTPATIENT)
Dept: PHYSICAL THERAPY | Facility: CLINIC | Age: 49
End: 2019-05-29
Payer: COMMERCIAL

## 2019-05-29 DIAGNOSIS — M75.32 CALCIFIC TENDINITIS OF LEFT SHOULDER: Primary | ICD-10-CM

## 2019-05-29 DIAGNOSIS — M25.612 SHOULDER STIFFNESS, LEFT: ICD-10-CM

## 2019-05-29 DIAGNOSIS — M75.52 SUBACROMIAL BURSITIS OF LEFT SHOULDER JOINT: ICD-10-CM

## 2019-05-29 PROCEDURE — 97112 NEUROMUSCULAR REEDUCATION: CPT | Performed by: PHYSICAL THERAPIST

## 2019-05-29 PROCEDURE — 97110 THERAPEUTIC EXERCISES: CPT | Performed by: PHYSICAL THERAPIST

## 2019-05-30 ENCOUNTER — OFFICE VISIT (OUTPATIENT)
Dept: OBGYN CLINIC | Facility: CLINIC | Age: 49
End: 2019-05-30
Payer: COMMERCIAL

## 2019-05-30 VITALS
SYSTOLIC BLOOD PRESSURE: 124 MMHG | WEIGHT: 173 LBS | HEIGHT: 65 IN | DIASTOLIC BLOOD PRESSURE: 77 MMHG | HEART RATE: 91 BPM | BODY MASS INDEX: 28.82 KG/M2

## 2019-05-30 DIAGNOSIS — M75.102 ROTATOR CUFF SYNDROME OF LEFT SHOULDER: ICD-10-CM

## 2019-05-30 DIAGNOSIS — M75.52 SUBACROMIAL BURSITIS OF LEFT SHOULDER JOINT: ICD-10-CM

## 2019-05-30 DIAGNOSIS — M75.32 CALCIFIC TENDINITIS OF LEFT SHOULDER: Primary | ICD-10-CM

## 2019-05-30 PROCEDURE — 99213 OFFICE O/P EST LOW 20 MIN: CPT | Performed by: FAMILY MEDICINE

## 2019-05-31 ENCOUNTER — OFFICE VISIT (OUTPATIENT)
Dept: PHYSICAL THERAPY | Facility: CLINIC | Age: 49
End: 2019-05-31
Payer: COMMERCIAL

## 2019-05-31 DIAGNOSIS — M25.612 SHOULDER STIFFNESS, LEFT: ICD-10-CM

## 2019-05-31 DIAGNOSIS — M75.52 SUBACROMIAL BURSITIS OF LEFT SHOULDER JOINT: ICD-10-CM

## 2019-05-31 DIAGNOSIS — M75.32 CALCIFIC TENDINITIS OF LEFT SHOULDER: Primary | ICD-10-CM

## 2019-05-31 PROCEDURE — 97112 NEUROMUSCULAR REEDUCATION: CPT | Performed by: PHYSICAL THERAPIST

## 2019-05-31 PROCEDURE — 97110 THERAPEUTIC EXERCISES: CPT | Performed by: PHYSICAL THERAPIST

## 2019-06-05 ENCOUNTER — OFFICE VISIT (OUTPATIENT)
Dept: PHYSICAL THERAPY | Facility: CLINIC | Age: 49
End: 2019-06-05
Payer: COMMERCIAL

## 2019-06-05 DIAGNOSIS — M25.612 SHOULDER STIFFNESS, LEFT: ICD-10-CM

## 2019-06-05 DIAGNOSIS — M75.52 SUBACROMIAL BURSITIS OF LEFT SHOULDER JOINT: ICD-10-CM

## 2019-06-05 DIAGNOSIS — M75.32 CALCIFIC TENDINITIS OF LEFT SHOULDER: Primary | ICD-10-CM

## 2019-06-05 DIAGNOSIS — M75.42 SUBACROMIAL IMPINGEMENT OF LEFT SHOULDER: ICD-10-CM

## 2019-06-05 PROCEDURE — 97112 NEUROMUSCULAR REEDUCATION: CPT | Performed by: PHYSICAL THERAPIST

## 2019-06-05 PROCEDURE — 97110 THERAPEUTIC EXERCISES: CPT | Performed by: PHYSICAL THERAPIST

## 2019-06-10 ENCOUNTER — APPOINTMENT (OUTPATIENT)
Dept: PHYSICAL THERAPY | Facility: CLINIC | Age: 49
End: 2019-06-10
Payer: COMMERCIAL

## 2019-06-13 ENCOUNTER — APPOINTMENT (OUTPATIENT)
Dept: PHYSICAL THERAPY | Facility: CLINIC | Age: 49
End: 2019-06-13
Payer: COMMERCIAL

## 2019-06-19 ENCOUNTER — APPOINTMENT (OUTPATIENT)
Dept: PHYSICAL THERAPY | Facility: CLINIC | Age: 49
End: 2019-06-19
Payer: COMMERCIAL

## 2019-06-24 ENCOUNTER — APPOINTMENT (OUTPATIENT)
Dept: PHYSICAL THERAPY | Facility: CLINIC | Age: 49
End: 2019-06-24
Payer: COMMERCIAL

## 2019-06-25 ENCOUNTER — APPOINTMENT (OUTPATIENT)
Dept: PHYSICAL THERAPY | Facility: CLINIC | Age: 49
End: 2019-06-25
Payer: COMMERCIAL

## 2019-07-10 ENCOUNTER — OFFICE VISIT (OUTPATIENT)
Dept: INTERNAL MEDICINE CLINIC | Facility: CLINIC | Age: 49
End: 2019-07-10
Payer: COMMERCIAL

## 2019-07-10 ENCOUNTER — TELEPHONE (OUTPATIENT)
Dept: INTERNAL MEDICINE CLINIC | Facility: CLINIC | Age: 49
End: 2019-07-10

## 2019-07-10 VITALS
DIASTOLIC BLOOD PRESSURE: 102 MMHG | BODY MASS INDEX: 27.99 KG/M2 | RESPIRATION RATE: 12 BRPM | WEIGHT: 168 LBS | HEART RATE: 68 BPM | HEIGHT: 65 IN | SYSTOLIC BLOOD PRESSURE: 162 MMHG

## 2019-07-10 DIAGNOSIS — I10 ESSENTIAL HYPERTENSION: ICD-10-CM

## 2019-07-10 DIAGNOSIS — E53.8 B12 DEFICIENCY: ICD-10-CM

## 2019-07-10 DIAGNOSIS — K14.6 TONGUE BURNING SENSATION: ICD-10-CM

## 2019-07-10 DIAGNOSIS — K14.8 TONGUE LESION: Primary | ICD-10-CM

## 2019-07-10 DIAGNOSIS — M35.00 SICCA SYNDROME (HCC): ICD-10-CM

## 2019-07-10 PROCEDURE — 99214 OFFICE O/P EST MOD 30 MIN: CPT | Performed by: INTERNAL MEDICINE

## 2019-07-10 PROCEDURE — 3008F BODY MASS INDEX DOCD: CPT | Performed by: INTERNAL MEDICINE

## 2019-07-10 NOTE — PROGRESS NOTES
Assessment/Plan:    Diagnoses and all orders for this visit:    Tongue lesion  -     CBC and differential; Future  -     Comprehensive metabolic panel; Future  -     Sedimentation rate, automated; Future  -     C-reactive protein; Future  -     MARY Screen w/ Reflex to Titer/Pattern; Future  -     Vitamin B12; Future  -     Methylmalonic acid, serum; Future  -     Folate; Future  -     HSV 1/2 IgM and Type Specific IgG; Future  -     Sjogren's Antibodies; Future    Tongue burning sensation  -     CBC and differential; Future  -     Comprehensive metabolic panel; Future  -     Sedimentation rate, automated; Future  -     C-reactive protein; Future  -     MARY Screen w/ Reflex to Titer/Pattern; Future  -     Vitamin B12; Future  -     Methylmalonic acid, serum; Future  -     Folate; Future  -     HSV 1/2 IgM and Type Specific IgG; Future  -     Sjogren's Antibodies; Future    Sicca syndrome (HCC)  -     CBC and differential; Future  -     Comprehensive metabolic panel; Future  -     Sedimentation rate, automated; Future  -     C-reactive protein; Future  -     MARY Screen w/ Reflex to Titer/Pattern; Future  -     Vitamin B12; Future  -     Methylmalonic acid, serum; Future  -     Folate; Future  -     HSV 1/2 IgM and Type Specific IgG; Future  -     Sjogren's Antibodies; Future    B12 deficiency  -     CBC and differential; Future  -     Comprehensive metabolic panel; Future  -     Sedimentation rate, automated; Future  -     C-reactive protein; Future  -     MARY Screen w/ Reflex to Titer/Pattern; Future  -     Vitamin B12; Future  -     Methylmalonic acid, serum; Future  -     Folate; Future  -     HSV 1/2 IgM and Type Specific IgG; Future  -     Sjogren's Antibodies;  Future    Essential hypertension         Patient Instructions   New onset tingling sensation of the tongue with sicca syndrome of unclear etiology, will complete lab workup to rule out Sjogren's or other up autoimmune process, rule out B12 deficiency, rule out HSV infection  Keep well hydrated and consider over-the-counter biotin to increase saliva, take out the contact lenses and use glasses until workup has been completed  Patient understands potential need for lip biopsy if labs suggestive of Sjogren's  Hypertension-patient is anxious today regarding the symptoms and has history of white coat hypertension  Home blood pressures have routinely been in the normal range  She will monitor home blood pressures closely and we will discuss further with our follow-up phone call after the lab work  Subjective:      Patient ID: Ni Shultz is a 46 y o  female    Patient presents for evaluation of a tingly dry sensation on her tongue  The symptoms have been present for approximately 1 week  At the onset of symptoms she noted 2 small reddish bumps on the tip of her tongue that were tender but they have resolved after about 3 days and the sensation on the tongue persists  She has also noted a dryness and tingling to her lips  She notes that she has similar sensation with food allergies in the past specifically to fresh pineapple however the the symptoms are transient in nature after exposure and these symptoms have persisted for the week  She has also noted dry eyes over the last for 5 days which is completely new to her, having been a contact lens wearer for 2 years with no prior problems with dry eye in the past   She is not having any arthralgias, myalgias, fevers, chills, skin rash  She has not taken any new medication, in fact she is only using the 2 Georgi and occasional vitamin-D supplement  She was treated with antibiotics in May for cellulitis but has not been on any more recent antibiotics or steroids  She did call [facility] and was prescribed prednisone for these symptoms yesterday but elected to come here rather than taking the medication    She denies any prior history of fever blisters or cold sores or known herpetic infection  Current Outpatient Medications:     Cholecalciferol (VITAMIN D3) 2000 units capsule, Take by mouth daily, Disp: , Rfl:     fexofenadine (ALLEGRA ALLERGY) 60 MG tablet, Take 60 mg by mouth as needed  , Disp: , Rfl:     fluticasone (FLONASE) 50 mcg/act nasal spray, 2 sprays into each nostril as needed  , Disp: , Rfl:     ipratropium-albuterol (DUO-NEB) 0 5-2 5 mg/3 mL nebulizer solution, Take 1 vial (3 mL total) by nebulization every 4 (four) hours as needed for shortness of breath, Disp: 120 vial, Rfl: 3    propranolol (INDERAL) 10 mg tablet, Take 1 tablet by mouth 2 (two) times a day as needed, Disp: , Rfl:     Turmeric 500 MG CAPS, Take by mouth 2 (two) times a day, Disp: , Rfl:     No results found for this or any previous visit (from the past 1008 hour(s))  The following portions of the patient's history were reviewed and updated as appropriate: allergies, current medications, past family history, past medical history, past social history, past surgical history and problem list      Review of Systems   Constitutional: Negative for appetite change, chills, diaphoresis, fatigue, fever and unexpected weight change  HENT: Positive for mouth sores  Negative for congestion, hearing loss and rhinorrhea  Eyes: Negative for visual disturbance  Respiratory: Negative for cough, chest tightness, shortness of breath and wheezing  Cardiovascular: Negative for chest pain, palpitations and leg swelling  Gastrointestinal: Negative for abdominal pain and blood in stool  Endocrine: Negative for cold intolerance, heat intolerance, polydipsia and polyuria  Genitourinary: Negative for difficulty urinating, dysuria, frequency and urgency  Musculoskeletal: Negative for arthralgias and myalgias  Skin: Negative for rash  Neurological: Negative for dizziness, weakness, light-headedness and headaches  Hematological: Does not bruise/bleed easily     Psychiatric/Behavioral: Negative for dysphoric mood and sleep disturbance  Objective:      Vitals:    07/10/19 1556   BP: (!) 162/102   Pulse:    Resp:           Physical Exam   Constitutional: She is oriented to person, place, and time  She appears well-developed and well-nourished  HENT:   Head: Normocephalic and atraumatic  Nose: Nose normal    Tongue is dry with white saliva, there is no cracks or fissures, there is erythema at the tip of the tongue without any ulcers or lesions   Eyes: Pupils are equal, round, and reactive to light  Conjunctivae and EOM are normal  No scleral icterus  Neck: Normal range of motion  Neck supple  No JVD present  No tracheal deviation present  No thyromegaly present  Cardiovascular: Normal rate, regular rhythm and intact distal pulses  Exam reveals no gallop and no friction rub  No murmur heard  Pulmonary/Chest: Effort normal and breath sounds normal  No respiratory distress  She has no wheezes  She has no rales  Musculoskeletal: She exhibits no edema or deformity  Lymphadenopathy:     She has no cervical adenopathy  Neurological: She is alert and oriented to person, place, and time  No cranial nerve deficit  Skin: Skin is warm and dry  No rash noted  No erythema  No pallor  Psychiatric: She has a normal mood and affect   Her behavior is normal  Judgment and thought content normal

## 2019-07-10 NOTE — TELEPHONE ENCOUNTER
Message for Dr Ana Driscoll:     Patient has an appt today, 3:30PM, SAME Day  Weird sensation in mouth, tongue, and lips  Tongue redder at the end  Going on for a week  Burnt sensation    No white specs in mouth  Poornima Jung made an appt because she didn't know if the dr would call something in for her symptoms or not- w/o being seen      IF, you don't think she should be seen, you can cancel her appt

## 2019-07-10 NOTE — PATIENT INSTRUCTIONS
New onset tingling sensation of the tongue with sicca syndrome of unclear etiology, will complete lab workup to rule out Sjogren's or other up autoimmune process, rule out B12 deficiency,  rule out HSV infection  Keep well hydrated and consider over-the-counter biotin to increase saliva, take out the contact lenses and use glasses until workup has been completed  Patient understands potential need for lip biopsy if labs suggestive of Sjogren's  Hypertension-patient is anxious today regarding the symptoms and has history of white coat hypertension  Home blood pressures have routinely been in the normal range  She will monitor home blood pressures closely and we will discuss further with our follow-up phone call after the lab work

## 2019-08-29 ENCOUNTER — OFFICE VISIT (OUTPATIENT)
Dept: OBGYN CLINIC | Facility: CLINIC | Age: 49
End: 2019-08-29
Payer: COMMERCIAL

## 2019-08-29 VITALS
HEIGHT: 65 IN | DIASTOLIC BLOOD PRESSURE: 83 MMHG | HEART RATE: 71 BPM | BODY MASS INDEX: 27.99 KG/M2 | SYSTOLIC BLOOD PRESSURE: 133 MMHG | WEIGHT: 168 LBS

## 2019-08-29 DIAGNOSIS — M75.32 CALCIFIC TENDINITIS OF LEFT SHOULDER: Primary | ICD-10-CM

## 2019-08-29 PROCEDURE — 99213 OFFICE O/P EST LOW 20 MIN: CPT | Performed by: FAMILY MEDICINE

## 2019-08-29 NOTE — PROGRESS NOTES
Assessment/Plan:  Assessment/Plan   Diagnoses and all orders for this visit:    Calcific tendinitis of left shoulder  -     Ambulatory referral to Sports Medicine; Future      18-year-old right-hand-dominant female with left shoulder pain of more than 2 years duration  Discussed with patient physical exam, impression and plan  She has intact range of motion and strength of the shoulder  I discussed with patient that since she has been symptomatic for more than 2 years and has done conservative management consisting of formal physical therapy home exercises since 02/12/2019 and having had corticosteroid injection she may consider more invasive management  I will refer to my colleague Dr Patt Vines for further evaluation and recommendation treatment  Subjective:   Patient ID: Ni Shultz is a 46 y o  female  Chief Complaint   Patient presents with    Left Shoulder - Pain, Follow-up       18-year-old right-hand-dominant female following up for left shoulder pain of more than 2 years duration  She was last seen 3 months ago at which point she was advised to continue physical therapy and home exercise program   Today she reports symptoms have been overall improved, however still experiencing pain  She has pain described as generalized to the shoulder but worse at the anterior lateral aspect, constant, achy and sometimes sharp, worse with physical activity, rated as 2-3 out of 10 intensity at worst, improved with rest, and not associated any numbness or tingling  She has been continuing with her home exercise program   Course of treatment to this point involved corticosteroid injection 01/20/2019 and having done formal physical therapy and home exercises from 02/12/2019 through 06/05/2019  Shoulder Pain   This is a chronic problem  The current episode started more than 1 year ago  The problem occurs constantly  The problem has been gradually improving  Associated symptoms include arthralgias   Pertinent negatives include no joint swelling, numbness or weakness  Exacerbated by: Arm use  She has tried rest (Physical therapy, corticosteroid injection) for the symptoms  The treatment provided moderate relief  Review of Systems   Musculoskeletal: Positive for arthralgias  Negative for joint swelling  Neurological: Negative for weakness and numbness  Objective:  Vitals:    08/29/19 0804   BP: 133/83   Pulse: 71   Weight: 76 2 kg (168 lb)   Height: 5' 5" (1 651 m)     Left Elbow Exam     Muscle Strength   The patient has normal left elbow strength  Left Shoulder Exam     Range of Motion   The patient has normal left shoulder ROM  Internal rotation 0 degrees: Lumbar     Muscle Strength   The patient has normal left shoulder strength  Tests   Ragland test: negative    Comments:  Negative belly press  Negative push-off            Physical Exam   Constitutional: She is oriented to person, place, and time  She appears well-developed  No distress  HENT:   Head: Normocephalic  Eyes: Conjunctivae are normal    Neck: No tracheal deviation present  Cardiovascular: Normal rate  Pulmonary/Chest: Effort normal  No respiratory distress  Abdominal: She exhibits no distension  Neurological: She is alert and oriented to person, place, and time  Skin: Skin is warm and dry  Psychiatric: She has a normal mood and affect  Her behavior is normal    Nursing note and vitals reviewed

## 2019-08-30 ENCOUNTER — OFFICE VISIT (OUTPATIENT)
Dept: INTERNAL MEDICINE CLINIC | Facility: CLINIC | Age: 49
End: 2019-08-30
Payer: COMMERCIAL

## 2019-08-30 VITALS
HEIGHT: 65 IN | SYSTOLIC BLOOD PRESSURE: 130 MMHG | BODY MASS INDEX: 28.16 KG/M2 | DIASTOLIC BLOOD PRESSURE: 82 MMHG | RESPIRATION RATE: 12 BRPM | HEART RATE: 80 BPM | WEIGHT: 169 LBS

## 2019-08-30 DIAGNOSIS — T14.8XXA MUSCULOSKELETAL STRAIN: Primary | ICD-10-CM

## 2019-08-30 PROCEDURE — 99213 OFFICE O/P EST LOW 20 MIN: CPT | Performed by: NURSE PRACTITIONER

## 2019-08-30 NOTE — PATIENT INSTRUCTIONS
Left-sided back pain discussed that this is musculoskeletal in nature  She cannot tolerate anti-inflammatories  Recommend Tylenol, warm compresses, topical analgesics as well    If symptoms are not improving over the next 3-4 days contact us

## 2019-08-30 NOTE — PROGRESS NOTES
Assessment/Plan:    Patient Instructions    Left-sided back pain discussed that this is musculoskeletal in nature  She cannot tolerate anti-inflammatories  Recommend Tylenol, warm compresses, topical analgesics as well  If symptoms are not improving over the next 3-4 days contact us         Diagnoses and all orders for this visit:    Musculoskeletal strain         Subjective:      Patient ID: Talib Chambers is a 46 y o  female     Patient has had lower back pain for about a week and a half  She does not recall any trauma  She states that she sitting upright does not move she has no pain but if she tries to twist her torso she has increase in pain she says is it is sharp and stabbing  If she is lying in bed and she rolls over in bed there is increase in pain  There is no urinary symptoms  No fevers chills nausea vomiting  No rash  Current Outpatient Medications:     Cholecalciferol (VITAMIN D3) 2000 units capsule, Take by mouth daily, Disp: , Rfl:     fexofenadine (ALLEGRA ALLERGY) 60 MG tablet, Take 60 mg by mouth as needed  , Disp: , Rfl:     fluticasone (FLONASE) 50 mcg/act nasal spray, 2 sprays into each nostril as needed  , Disp: , Rfl:     ipratropium-albuterol (DUO-NEB) 0 5-2 5 mg/3 mL nebulizer solution, Take 1 vial (3 mL total) by nebulization every 4 (four) hours as needed for shortness of breath, Disp: 120 vial, Rfl: 3    propranolol (INDERAL) 10 mg tablet, Take 1 tablet by mouth 2 (two) times a day as needed, Disp: , Rfl:     Turmeric 500 MG CAPS, Take by mouth 2 (two) times a day, Disp: , Rfl:     No results found for this or any previous visit (from the past 1008 hour(s))      The following portions of the patient's history were reviewed and updated as appropriate: allergies, current medications, past family history, past medical history, past social history, past surgical history and problem list      Review of Systems   Constitutional: Negative for appetite change, chills, diaphoresis, fatigue, fever and unexpected weight change  HENT: Negative for postnasal drip and sneezing  Eyes: Negative for visual disturbance  Respiratory: Negative for chest tightness and shortness of breath  Cardiovascular: Negative for chest pain, palpitations and leg swelling  Gastrointestinal: Negative for abdominal pain and blood in stool  Endocrine: Negative for cold intolerance, heat intolerance, polydipsia, polyphagia and polyuria  Genitourinary: Negative for difficulty urinating, dysuria, frequency and urgency  Musculoskeletal: Positive for back pain  Negative for arthralgias and myalgias  Skin: Negative for rash and wound  Neurological: Negative for dizziness, weakness, light-headedness and headaches  Hematological: Negative for adenopathy  Psychiatric/Behavioral: Negative for confusion, dysphoric mood and sleep disturbance  The patient is not nervous/anxious  Objective:      /82 (BP Location: Left arm, Patient Position: Sitting, Cuff Size: Standard)   Pulse 80   Resp 12   Ht 5' 5" (1 651 m)   Wt 76 7 kg (169 lb)   BMI 28 12 kg/m²        Physical Exam   Constitutional: She is oriented to person, place, and time  She appears well-developed  No distress  HENT:   Head: Normocephalic and atraumatic  Nose: Nose normal    Mouth/Throat: Oropharynx is clear and moist    Eyes: Pupils are equal, round, and reactive to light  Conjunctivae and EOM are normal    Neck: Normal range of motion  Neck supple  No JVD present  No tracheal deviation present  No thyromegaly present  Cardiovascular: Normal rate, regular rhythm, normal heart sounds and intact distal pulses  Exam reveals no gallop and no friction rub  No murmur heard  Pulmonary/Chest: Effort normal and breath sounds normal  No respiratory distress  She has no wheezes  She has no rales  Abdominal: Soft  Bowel sounds are normal  She exhibits no distension  There is no tenderness      No CVA tenderness Musculoskeletal: Normal range of motion  She exhibits no edema  Tenderness when laying down on the table cannot perform straight leg raise without lower back pain   Lymphadenopathy:     She has no cervical adenopathy  Neurological: She is alert and oriented to person, place, and time  No cranial nerve deficit  Skin: Skin is warm and dry  No rash noted  She is not diaphoretic  Psychiatric: She has a normal mood and affect   Her behavior is normal  Judgment and thought content normal

## 2019-09-10 ENCOUNTER — OFFICE VISIT (OUTPATIENT)
Dept: INTERNAL MEDICINE CLINIC | Facility: CLINIC | Age: 49
End: 2019-09-10
Payer: COMMERCIAL

## 2019-09-10 VITALS
DIASTOLIC BLOOD PRESSURE: 90 MMHG | WEIGHT: 165.8 LBS | BODY MASS INDEX: 27.63 KG/M2 | SYSTOLIC BLOOD PRESSURE: 160 MMHG | RESPIRATION RATE: 16 BRPM | HEART RATE: 80 BPM | HEIGHT: 65 IN

## 2019-09-10 DIAGNOSIS — T30.0 BURN: Primary | ICD-10-CM

## 2019-09-10 PROCEDURE — 99213 OFFICE O/P EST LOW 20 MIN: CPT | Performed by: NURSE PRACTITIONER

## 2019-09-10 NOTE — PATIENT INSTRUCTIONS
Musculoskeletal strain to back markedly improved     2nd degree burn unfortunately from heating pad  Healing well there is a small amount of yellow granulating tissue with mild erythema surrounding but no evidence of actual infection discussed cleaning cleaning it every day with soap and water patting dry and we will use Silvadene for the next 1 week  In 1 week she should take a picture and up low to my chart for us to re-evaluate

## 2019-09-10 NOTE — PROGRESS NOTES
Assessment/Plan:    Patient Instructions     Musculoskeletal strain to back markedly improved     2nd degree burn unfortunately from heating pad  Healing well there is a small amount of yellow granulating tissue with mild erythema surrounding but no evidence of actual infection discussed cleaning cleaning it every day with soap and water patting dry and we will use Silvadene for the next 1 week  In 1 week she should take a picture and up low to my chart for us to re-evaluate  Diagnoses and all orders for this visit:    Burn  -     silver sulfadiazine (SILVADENE,SSD) 1 % cream; Apply topically daily         Subjective:      Patient ID: Ángela Cruz is a 46 y o  female      Patient had a heating pad on when was treating her lower back pain unfortunately fell asleep and developed a burn to the lower back  She was cleaning it with soap and water putting aloe and Neosporin on, then she was in [place] and when the beach and and 2 days later her  noted that it started to look yellow and thought she should have it evaluated  There is no fevers chills or drainage    She states her lower back pain is improved it probably would feel better if she was able to continue to use the heat        Current Outpatient Medications:     Cholecalciferol (VITAMIN D3) 2000 units capsule, Take by mouth daily, Disp: , Rfl:     fexofenadine (ALLEGRA ALLERGY) 60 MG tablet, Take 60 mg by mouth as needed  , Disp: , Rfl:     fluticasone (FLONASE) 50 mcg/act nasal spray, 2 sprays into each nostril as needed  , Disp: , Rfl:     ipratropium-albuterol (DUO-NEB) 0 5-2 5 mg/3 mL nebulizer solution, Take 1 vial (3 mL total) by nebulization every 4 (four) hours as needed for shortness of breath, Disp: 120 vial, Rfl: 3    propranolol (INDERAL) 10 mg tablet, Take 1 tablet by mouth 2 (two) times a day as needed, Disp: , Rfl:     Turmeric 500 MG CAPS, Take by mouth 2 (two) times a day, Disp: , Rfl:     silver sulfadiazine (SILVADENE,SSD) 1 % cream, Apply topically daily, Disp: 50 g, Rfl: 0    No results found for this or any previous visit (from the past 1008 hour(s))  The following portions of the patient's history were reviewed and updated as appropriate: allergies, current medications, past family history, past medical history, past social history, past surgical history and problem list      Review of Systems   Constitutional: Negative for appetite change, chills, diaphoresis, fatigue, fever and unexpected weight change  HENT: Negative for postnasal drip and sneezing  Eyes: Negative for visual disturbance  Respiratory: Negative for chest tightness and shortness of breath  Cardiovascular: Negative for chest pain, palpitations and leg swelling  Gastrointestinal: Negative for abdominal pain and blood in stool  Endocrine: Negative for cold intolerance, heat intolerance, polydipsia, polyphagia and polyuria  Genitourinary: Negative for difficulty urinating, dysuria, frequency and urgency  Musculoskeletal: Negative for arthralgias and myalgias  Skin: Negative for rash and wound  Burn to left lower back   Neurological: Negative for dizziness, weakness, light-headedness and headaches  Hematological: Negative for adenopathy  Psychiatric/Behavioral: Negative for confusion, dysphoric mood and sleep disturbance  The patient is not nervous/anxious  Objective:      /90 (BP Location: Left arm, Patient Position: Sitting, Cuff Size: Standard) Comment: patient feels very anxious  Pulse 80   Resp 16   Ht 5' 5" (1 651 m)   Wt 75 2 kg (165 lb 12 8 oz) Comment: Patient declined taking her shoes off  BMI 27 59 kg/m²        Physical Exam   Constitutional: She is oriented to person, place, and time  She appears well-developed  No distress  HENT:   Head: Normocephalic and atraumatic     Nose: Nose normal    Mouth/Throat: Oropharynx is clear and moist    Eyes: Pupils are equal, round, and reactive to light  Conjunctivae and EOM are normal    Neck: Normal range of motion  Neck supple  No JVD present  No tracheal deviation present  No thyromegaly present  Cardiovascular: Normal rate, regular rhythm, normal heart sounds and intact distal pulses  Exam reveals no gallop and no friction rub  No murmur heard  Pulmonary/Chest: Effort normal and breath sounds normal  No respiratory distress  She has no wheezes  She has no rales  Abdominal: Soft  Bowel sounds are normal  She exhibits no distension  There is no tenderness  Musculoskeletal: Normal range of motion  She exhibits no edema  Lymphadenopathy:     She has no cervical adenopathy  Neurological: She is alert and oriented to person, place, and time  No cranial nerve deficit  Skin: Skin is warm and dry  No rash noted  She is not diaphoretic  Quarter-size burn noted to left lower back mild slight thing yellow tissue and mild erythema noted surrounding   Psychiatric: She has a normal mood and affect  Her behavior is normal  Judgment and thought content normal    BMI Counseling: Body mass index is 27 59 kg/m²  The BMI is above normal  Nutrition recommendations include reducing portion sizes

## 2019-09-18 ENCOUNTER — TELEPHONE (OUTPATIENT)
Dept: INTERNAL MEDICINE CLINIC | Facility: CLINIC | Age: 49
End: 2019-09-18

## 2019-11-01 ENCOUNTER — APPOINTMENT (OUTPATIENT)
Dept: LAB | Facility: HOSPITAL | Age: 49
End: 2019-11-01
Attending: INTERNAL MEDICINE
Payer: COMMERCIAL

## 2019-11-01 DIAGNOSIS — I10 ESSENTIAL HYPERTENSION: ICD-10-CM

## 2019-11-01 LAB
ALBUMIN SERPL BCP-MCNC: 3.5 G/DL (ref 3.5–5)
ALP SERPL-CCNC: 72 U/L (ref 46–116)
ALT SERPL W P-5'-P-CCNC: 43 U/L (ref 12–78)
ANION GAP SERPL CALCULATED.3IONS-SCNC: 6 MMOL/L (ref 4–13)
AST SERPL W P-5'-P-CCNC: 21 U/L (ref 5–45)
BASOPHILS # BLD AUTO: 0.05 THOUSANDS/ΜL (ref 0–0.1)
BASOPHILS NFR BLD AUTO: 1 % (ref 0–1)
BILIRUB SERPL-MCNC: 0.3 MG/DL (ref 0.2–1)
BUN SERPL-MCNC: 19 MG/DL (ref 5–25)
CALCIUM SERPL-MCNC: 8.9 MG/DL (ref 8.3–10.1)
CHLORIDE SERPL-SCNC: 107 MMOL/L (ref 100–108)
CHOLEST SERPL-MCNC: 173 MG/DL (ref 50–200)
CO2 SERPL-SCNC: 30 MMOL/L (ref 21–32)
CREAT SERPL-MCNC: 0.79 MG/DL (ref 0.6–1.3)
EOSINOPHIL # BLD AUTO: 0.18 THOUSAND/ΜL (ref 0–0.61)
EOSINOPHIL NFR BLD AUTO: 3 % (ref 0–6)
ERYTHROCYTE [DISTWIDTH] IN BLOOD BY AUTOMATED COUNT: 13.7 % (ref 11.6–15.1)
GFR SERPL CREATININE-BSD FRML MDRD: 88 ML/MIN/1.73SQ M
GLUCOSE P FAST SERPL-MCNC: 90 MG/DL (ref 65–99)
HCT VFR BLD AUTO: 40.5 % (ref 34.8–46.1)
HDLC SERPL-MCNC: 74 MG/DL
HGB BLD-MCNC: 12.8 G/DL (ref 11.5–15.4)
IMM GRANULOCYTES # BLD AUTO: 0.02 THOUSAND/UL (ref 0–0.2)
IMM GRANULOCYTES NFR BLD AUTO: 0 % (ref 0–2)
LDLC SERPL CALC-MCNC: 89 MG/DL (ref 0–100)
LYMPHOCYTES # BLD AUTO: 2.18 THOUSANDS/ΜL (ref 0.6–4.47)
LYMPHOCYTES NFR BLD AUTO: 41 % (ref 14–44)
MCH RBC QN AUTO: 29.4 PG (ref 26.8–34.3)
MCHC RBC AUTO-ENTMCNC: 31.6 G/DL (ref 31.4–37.4)
MCV RBC AUTO: 93 FL (ref 82–98)
MONOCYTES # BLD AUTO: 0.5 THOUSAND/ΜL (ref 0.17–1.22)
MONOCYTES NFR BLD AUTO: 9 % (ref 4–12)
NEUTROPHILS # BLD AUTO: 2.45 THOUSANDS/ΜL (ref 1.85–7.62)
NEUTS SEG NFR BLD AUTO: 46 % (ref 43–75)
NONHDLC SERPL-MCNC: 99 MG/DL
NRBC BLD AUTO-RTO: 0 /100 WBCS
PLATELET # BLD AUTO: 253 THOUSANDS/UL (ref 149–390)
PMV BLD AUTO: 10.8 FL (ref 8.9–12.7)
POTASSIUM SERPL-SCNC: 3.9 MMOL/L (ref 3.5–5.3)
PROT SERPL-MCNC: 7.1 G/DL (ref 6.4–8.2)
RBC # BLD AUTO: 4.36 MILLION/UL (ref 3.81–5.12)
SODIUM SERPL-SCNC: 143 MMOL/L (ref 136–145)
T4 FREE SERPL-MCNC: 0.66 NG/DL (ref 0.76–1.46)
TRIGL SERPL-MCNC: 49 MG/DL
TSH SERPL DL<=0.05 MIU/L-ACNC: 5.88 UIU/ML (ref 0.36–3.74)
WBC # BLD AUTO: 5.38 THOUSAND/UL (ref 4.31–10.16)

## 2019-11-01 PROCEDURE — 85025 COMPLETE CBC W/AUTO DIFF WBC: CPT

## 2019-11-01 PROCEDURE — 84443 ASSAY THYROID STIM HORMONE: CPT

## 2019-11-01 PROCEDURE — 36415 COLL VENOUS BLD VENIPUNCTURE: CPT

## 2019-11-01 PROCEDURE — 80061 LIPID PANEL: CPT

## 2019-11-01 PROCEDURE — 80053 COMPREHEN METABOLIC PANEL: CPT

## 2019-11-01 PROCEDURE — 84439 ASSAY OF FREE THYROXINE: CPT

## 2019-11-04 ENCOUNTER — OFFICE VISIT (OUTPATIENT)
Dept: INTERNAL MEDICINE CLINIC | Facility: CLINIC | Age: 49
End: 2019-11-04
Payer: COMMERCIAL

## 2019-11-04 VITALS
SYSTOLIC BLOOD PRESSURE: 148 MMHG | HEART RATE: 60 BPM | BODY MASS INDEX: 26.79 KG/M2 | DIASTOLIC BLOOD PRESSURE: 80 MMHG | RESPIRATION RATE: 14 BRPM | HEIGHT: 65 IN | WEIGHT: 160.8 LBS

## 2019-11-04 DIAGNOSIS — I10 ESSENTIAL HYPERTENSION: ICD-10-CM

## 2019-11-04 DIAGNOSIS — F41.9 ANXIETY: ICD-10-CM

## 2019-11-04 DIAGNOSIS — J45.20 MILD INTERMITTENT ASTHMA, UNSPECIFIED WHETHER COMPLICATED: Primary | ICD-10-CM

## 2019-11-04 DIAGNOSIS — E03.8 SUBCLINICAL HYPOTHYROIDISM: ICD-10-CM

## 2019-11-04 DIAGNOSIS — I10 WHITE COAT SYNDROME WITH DIAGNOSIS OF HYPERTENSION: ICD-10-CM

## 2019-11-04 PROCEDURE — 99214 OFFICE O/P EST MOD 30 MIN: CPT | Performed by: INTERNAL MEDICINE

## 2019-11-04 PROCEDURE — 3008F BODY MASS INDEX DOCD: CPT | Performed by: INTERNAL MEDICINE

## 2019-11-04 NOTE — PROGRESS NOTES
Assessment/Plan:    Diagnoses and all orders for this visit:    Mild intermittent asthma, unspecified whether complicated    Essential hypertension    White coat syndrome with diagnosis of hypertension    Anxiety    Subclinical hypothyroidism  -     TSH, 3rd generation with Free T4 reflex; Future         Patient Instructions   Lab data reviewed in detail and compared to prior    Subclinical hypothyroidism-will recheck TSH prior to follow-up in 6 months  Monitor for symptoms of excessive fatigue, poor appetite, dry skin, hair or nails in addition to constipation and cold intolerance  Contact me should any of these things occur    Hypertension-home blood pressures are generally within the normal range  Patient advised to follow blood pressures not only at home but also at work and contact me in 2-3 weeks with blood pressure readings  Notably her home blood pressure monitor in the office is essentially identical to the readings that I got  Anxiety related to public speaking-continue with propranolol as needed    Asthma and allergy appear clinically stable    Routine follow-up in 6 months, sooner as needed  Subjective:      Patient ID: Portia Cota is a 46 y o  female    Feeling generally well  Abnormal tongue sensation resolved after stopping drink w/ artificial sweetner  HTN-home bp's 90% <130/80, had an episode of vertigo the day of her labs and bp was 157/88, then 103/68, resolved after decongestant and rest     Back to exercising elliptical in am, and walking 30-60 min about 6 days per week  Propranolol only for presentations every few mos  Allergy and asthma stable using proair for colds and heavy perfume rxn etc    Subclinical hypothyroid-bowels and appetite are stable           Current Outpatient Medications:     Cholecalciferol (VITAMIN D3) 2000 units capsule, Take by mouth daily, Disp: , Rfl:     fexofenadine (ALLEGRA ALLERGY) 60 MG tablet, Take 60 mg by mouth as needed  , Disp: , Rfl:     fluticasone (FLONASE) 50 mcg/act nasal spray, 2 sprays into each nostril as needed  , Disp: , Rfl:     Turmeric 500 MG CAPS, Take by mouth 2 (two) times a day, Disp: , Rfl:     ipratropium-albuterol (DUO-NEB) 0 5-2 5 mg/3 mL nebulizer solution, Take 1 vial (3 mL total) by nebulization every 4 (four) hours as needed for shortness of breath (Patient not taking: Reported on 11/4/2019), Disp: 120 vial, Rfl: 3    propranolol (INDERAL) 10 mg tablet, Take 1 tablet by mouth 2 (two) times a day as needed, Disp: , Rfl:     Recent Results (from the past 1008 hour(s))   CBC and differential    Collection Time: 11/01/19  6:39 AM   Result Value Ref Range    WBC 5 38 4 31 - 10 16 Thousand/uL    RBC 4 36 3 81 - 5 12 Million/uL    Hemoglobin 12 8 11 5 - 15 4 g/dL    Hematocrit 40 5 34 8 - 46 1 %    MCV 93 82 - 98 fL    MCH 29 4 26 8 - 34 3 pg    MCHC 31 6 31 4 - 37 4 g/dL    RDW 13 7 11 6 - 15 1 %    MPV 10 8 8 9 - 12 7 fL    Platelets 094 321 - 815 Thousands/uL    nRBC 0 /100 WBCs    Neutrophils Relative 46 43 - 75 %    Immat GRANS % 0 0 - 2 %    Lymphocytes Relative 41 14 - 44 %    Monocytes Relative 9 4 - 12 %    Eosinophils Relative 3 0 - 6 %    Basophils Relative 1 0 - 1 %    Neutrophils Absolute 2 45 1 85 - 7 62 Thousands/µL    Immature Grans Absolute 0 02 0 00 - 0 20 Thousand/uL    Lymphocytes Absolute 2 18 0 60 - 4 47 Thousands/µL    Monocytes Absolute 0 50 0 17 - 1 22 Thousand/µL    Eosinophils Absolute 0 18 0 00 - 0 61 Thousand/µL    Basophils Absolute 0 05 0 00 - 0 10 Thousands/µL   Comprehensive metabolic panel    Collection Time: 11/01/19  6:39 AM   Result Value Ref Range    Sodium 143 136 - 145 mmol/L    Potassium 3 9 3 5 - 5 3 mmol/L    Chloride 107 100 - 108 mmol/L    CO2 30 21 - 32 mmol/L    ANION GAP 6 4 - 13 mmol/L    BUN 19 5 - 25 mg/dL    Creatinine 0 79 0 60 - 1 30 mg/dL    Glucose, Fasting 90 65 - 99 mg/dL    Calcium 8 9 8 3 - 10 1 mg/dL    AST 21 5 - 45 U/L    ALT 43 12 - 78 U/L    Alkaline Phosphatase 72 46 - 116 U/L    Total Protein 7 1 6 4 - 8 2 g/dL    Albumin 3 5 3 5 - 5 0 g/dL    Total Bilirubin 0 30 0 20 - 1 00 mg/dL    eGFR 88 ml/min/1 73sq m   Lipid panel    Collection Time: 11/01/19  6:39 AM   Result Value Ref Range    Cholesterol 173 50 - 200 mg/dL    Triglycerides 49 <=150 mg/dL    HDL, Direct 74 >=40 mg/dL    LDL Calculated 89 0 - 100 mg/dL    Non-HDL-Chol (CHOL-HDL) 99 mg/dl   TSH, 3rd generation with Free T4 reflex    Collection Time: 11/01/19  6:39 AM   Result Value Ref Range    TSH 3RD GENERATON 5 879 (H) 0 358 - 3 740 uIU/mL   T4, free    Collection Time: 11/01/19  6:39 AM   Result Value Ref Range    Free T4 0 66 (L) 0 76 - 1 46 ng/dL       The following portions of the patient's history were reviewed and updated as appropriate: allergies, current medications, past family history, past medical history, past social history, past surgical history and problem list      Review of Systems   Constitutional: Negative for appetite change, chills, diaphoresis, fatigue, fever and unexpected weight change  HENT: Negative for congestion, hearing loss and rhinorrhea  Eyes: Negative for visual disturbance  Respiratory: Negative for cough, chest tightness, shortness of breath and wheezing  Cardiovascular: Negative for chest pain, palpitations and leg swelling  Gastrointestinal: Negative for abdominal pain and blood in stool  Endocrine: Negative for cold intolerance, heat intolerance, polydipsia and polyuria  Genitourinary: Negative for difficulty urinating, dysuria, frequency and urgency  Musculoskeletal: Negative for arthralgias and myalgias  Skin: Negative for rash  Neurological: Negative for dizziness, weakness, light-headedness and headaches  Hematological: Does not bruise/bleed easily  Psychiatric/Behavioral: Negative for dysphoric mood and sleep disturbance           Objective:      Vitals:    11/04/19 1251   BP: 148/80   Pulse:    Resp:           Physical Exam Constitutional: She is oriented to person, place, and time  She appears well-developed and well-nourished  HENT:   Head: Normocephalic and atraumatic  Nose: Nose normal    Mouth/Throat: Oropharynx is clear and moist    Eyes: Pupils are equal, round, and reactive to light  Conjunctivae and EOM are normal  No scleral icterus  Neck: Normal range of motion  Neck supple  No JVD present  No tracheal deviation present  No thyromegaly present  Cardiovascular: Normal rate, regular rhythm and intact distal pulses  Exam reveals no gallop and no friction rub  No murmur heard  Pulmonary/Chest: Effort normal and breath sounds normal  No respiratory distress  She has no wheezes  She has no rales  Musculoskeletal: She exhibits no edema or deformity  Lymphadenopathy:     She has no cervical adenopathy  Neurological: She is alert and oriented to person, place, and time  No cranial nerve deficit  Skin: Skin is warm and dry  No rash noted  No erythema  No pallor  Psychiatric: She has a normal mood and affect   Her behavior is normal  Judgment and thought content normal

## 2019-11-04 NOTE — PATIENT INSTRUCTIONS
Lab data reviewed in detail and compared to prior    Subclinical hypothyroidism-will recheck TSH prior to follow-up in 6 months  Monitor for symptoms of excessive fatigue, poor appetite, dry skin, hair or nails in addition to constipation and cold intolerance  Contact me should any of these things occur    Hypertension-home blood pressures are generally within the normal range  Patient advised to follow blood pressures not only at home but also at work and contact me in 2-3 weeks with blood pressure readings  Notably her home blood pressure monitor in the office is essentially identical to the readings that I got  Anxiety related to public speaking-continue with propranolol as needed    Asthma and allergy appear clinically stable    Routine follow-up in 6 months, sooner as needed

## 2020-01-24 ENCOUNTER — OFFICE VISIT (OUTPATIENT)
Dept: INTERNAL MEDICINE CLINIC | Facility: CLINIC | Age: 50
End: 2020-01-24
Payer: COMMERCIAL

## 2020-01-24 ENCOUNTER — TELEPHONE (OUTPATIENT)
Dept: INTERNAL MEDICINE CLINIC | Facility: CLINIC | Age: 50
End: 2020-01-24

## 2020-01-24 VITALS
TEMPERATURE: 97.9 F | HEIGHT: 65 IN | SYSTOLIC BLOOD PRESSURE: 136 MMHG | WEIGHT: 167.2 LBS | RESPIRATION RATE: 16 BRPM | HEART RATE: 106 BPM | OXYGEN SATURATION: 98 % | DIASTOLIC BLOOD PRESSURE: 88 MMHG | BODY MASS INDEX: 27.86 KG/M2

## 2020-01-24 DIAGNOSIS — J40 BRONCHITIS: Primary | ICD-10-CM

## 2020-01-24 DIAGNOSIS — I10 ESSENTIAL HYPERTENSION: ICD-10-CM

## 2020-01-24 PROBLEM — IMO0001 WHITE COAT HYPERTENSION: Status: ACTIVE | Noted: 2020-01-24

## 2020-01-24 PROCEDURE — 3008F BODY MASS INDEX DOCD: CPT | Performed by: NURSE PRACTITIONER

## 2020-01-24 PROCEDURE — 99214 OFFICE O/P EST MOD 30 MIN: CPT | Performed by: NURSE PRACTITIONER

## 2020-01-24 PROCEDURE — 3075F SYST BP GE 130 - 139MM HG: CPT | Performed by: NURSE PRACTITIONER

## 2020-01-24 PROCEDURE — 1036F TOBACCO NON-USER: CPT | Performed by: NURSE PRACTITIONER

## 2020-01-24 PROCEDURE — 3079F DIAST BP 80-89 MM HG: CPT | Performed by: NURSE PRACTITIONER

## 2020-01-24 RX ORDER — ALBUTEROL SULFATE 90 UG/1
AEROSOL, METERED RESPIRATORY (INHALATION)
COMMUNITY
Start: 2015-09-01 | End: 2020-01-24 | Stop reason: SDUPTHER

## 2020-01-24 RX ORDER — BENZONATATE 100 MG/1
100 CAPSULE ORAL 3 TIMES DAILY PRN
Qty: 20 CAPSULE | Refills: 0 | Status: SHIPPED | OUTPATIENT
Start: 2020-01-24 | End: 2020-07-23 | Stop reason: ALTCHOICE

## 2020-01-24 RX ORDER — ALBUTEROL SULFATE 90 UG/1
2 AEROSOL, METERED RESPIRATORY (INHALATION) EVERY 4 HOURS PRN
Qty: 1 INHALER | Refills: 1 | Status: SHIPPED | OUTPATIENT
Start: 2020-01-24 | End: 2022-05-22 | Stop reason: SDUPTHER

## 2020-01-24 RX ORDER — PREDNISONE 10 MG/1
TABLET ORAL
Qty: 30 TABLET | Refills: 0 | Status: SHIPPED | OUTPATIENT
Start: 2020-01-24 | End: 2020-07-06

## 2020-01-24 NOTE — PROGRESS NOTES
INTERNAL MEDICINE FOLLOW-UP OFFICE VISIT  [facility] [address] [place] [address]    NAME: Jessica Ballard  AGE: 44 y o  SEX: female    DATE OF ENCOUNTER: 1/24/2020   Assessment and Plan:     Problem List Items Addressed This Visit        Cardiovascular and Mediastinum    Essential hypertension       Blood pressure in the office today is 136/88  The patient does have a history of white coat hypertension  Other Visit Diagnoses     Bronchitis    -  Primary      Prednisone taper, Flonase, Allegra, DuoNeb, Proventil inhaler and Tessalon Perles  Relevant Medications    albuterol (VENTOLIN HFA) 90 mcg/act inhaler    predniSONE 10 mg tablet    benzonatate (TESSALON PERLES) 100 mg capsule          No follow-ups on file  Counseling:     · Medication Side Effects - Adverse side effects of medications were reviewed with the patient/guardian today: Yes  · Counseling was given regarding: Intructions for management  · Barriers to treatment include: No identified barriers      Chief Complaint:     Chief Complaint   Patient presents with    Asthma     chest tightness- symptoms started yesterday  History of Present Illness:     MAN Steele presents to the office today for a same-day sick visit  She has a past medical history of asthma, vitamin-D deficiency, white coat hypertension, anxiety and subclinical hypothyroidism  The patient states that yesterday she began to feel chest tightness along with a cough  The patient denies a productive cough, fever, chest pain, ear pain or rhinorrhea  She does complain of a postnasal drip  When the patient began having symptoms she started back on her Allegra and Flonase  She states she has also been using her Ventolin inhaler every 4 hours as needed  Patient states she had been wheezing at home last night and this morning  On exam today in the office the patient is afebrile  She does have a cough which is nonproductive    Her lungs are decreased with some faint wheezing  She states she feels like her chest is tight when she tries to take a deep breath  The patient has had these symptoms in the past and had been treated for bronchitis  Prednisone taper was sent to her pharmacy  The patient had not been using her DuoNeb at home and she was encouraged to start using that every 4 hours as needed  Aurea Sneed were sent to the pharmacy for the patient's cough  She will continue to take her Allegra and her Flonase  Patient has been instructed that if she develops a fever, shortness of breath, or chest pain she should report to the emergency room  She will contact our office early next week if her symptoms worsen  The following portions of the patient's history were reviewed and updated as appropriate: allergies, current medications, past family history, past medical history, past social history, past surgical history and problem list      Review of Systems:     Review of Systems   Constitutional: Negative  HENT: Positive for postnasal drip  Negative for congestion, ear pain, sinus pressure, sinus pain and sore throat  Eyes: Negative  Respiratory: Positive for cough, chest tightness, shortness of breath (with exertion) and wheezing  Cardiovascular: Negative  Gastrointestinal: Negative  Endocrine: Negative  Genitourinary: Negative  Musculoskeletal: Negative  Neurological: Negative  Psychiatric/Behavioral: Negative           Problem List:     Patient Active Problem List   Diagnosis    Anxiety    Asthma    Essential hypertension    Mild vitamin D deficiency    White coat syndrome with diagnosis of hypertension    Special screening for malignant neoplasms, colon    Subclinical hypothyroidism        Objective:     /88 (BP Location: Left arm, Patient Position: Sitting, Cuff Size: Standard)   Pulse (!) 106   Temp 97 9 °F (36 6 °C) (Oral)   Resp 16   Ht 5' 4 75" (1 645 m)   Wt 75 8 kg (167 lb 3 2 oz)   SpO2 98%   BMI 28 04 kg/m²     Physical Exam   Constitutional: She is oriented to person, place, and time  She appears well-developed and well-nourished  No distress  HENT:   Head: Normocephalic and atraumatic  Right Ear: External ear normal    Left Ear: External ear normal    Nose: Nose normal    Mouth/Throat: Oropharynx is clear and moist    Eyes: Pupils are equal, round, and reactive to light  EOM are normal    Neck: Normal range of motion  Neck supple  Cardiovascular: Normal rate, regular rhythm and normal heart sounds  Pulmonary/Chest: Effort normal  She has wheezes (faint wheeze)  Musculoskeletal: Normal range of motion  Lymphadenopathy:     She has no cervical adenopathy  Neurological: She is alert and oriented to person, place, and time  Skin: Skin is warm and dry  Psychiatric: She has a normal mood and affect  Her behavior is normal  Judgment and thought content normal        Pertinent Laboratory/Diagnostic Studies:    Laboratory Results: I have personally reviewed the pertinent laboratory results/reports   Radiology/Other Diagnostic Testing Results: I have personally reviewed pertinent reports         Current Medications:     Current Outpatient Medications   Medication Sig Dispense Refill    albuterol (VENTOLIN HFA) 90 mcg/act inhaler Inhale      Cholecalciferol (VITAMIN D3) 2000 units capsule Take by mouth daily      fexofenadine (ALLEGRA ALLERGY) 60 MG tablet Take 60 mg by mouth as needed        fluticasone (FLONASE) 50 mcg/act nasal spray 2 sprays into each nostril as needed        Turmeric 500 MG CAPS Take by mouth 2 (two) times a day      ipratropium-albuterol (DUO-NEB) 0 5-2 5 mg/3 mL nebulizer solution Take 1 vial (3 mL total) by nebulization every 4 (four) hours as needed for shortness of breath (Patient not taking: Reported on 1/24/2020) 120 vial 3    propranolol (INDERAL) 10 mg tablet Take 1 tablet by mouth 2 (two) times a day as needed       No current facility-administered medications for this visit  There are no Patient Instructions on file for this visit      LEIF Randall  [facility]

## 2020-01-24 NOTE — PATIENT INSTRUCTIONS
Acute Bronchitis   WHAT YOU NEED TO KNOW:   Acute bronchitis is swelling and irritation in the air passages of your lungs  This irritation may cause you to cough or have other breathing problems  Acute bronchitis often starts because of another illness, such as a cold or the flu  The illness spreads from your nose and throat to your windpipe and airways  Bronchitis is often called a chest cold  Acute bronchitis lasts about 3 to 6 weeks and is usually not a serious illness  Your cough can last for several weeks  DISCHARGE INSTRUCTIONS:   Return to the emergency department if:   · You cough up blood  · Your lips or fingernails turn blue  · You feel like you are not getting enough air when you breathe  Contact your healthcare provider if:   · You have a fever  · Your breathing problems do not go away or get worse  · Your cough does not get better within 4 weeks  · You have questions or concerns about your condition or care  Self-care:   · Get more rest   Rest helps your body to heal  Slowly start to do more each day  Rest when you feel it is needed  · Avoid irritants in the air  Avoid chemicals, fumes, and dust  Wear a face mask if you must work around dust or fumes  Stay inside on days when air pollution levels are high  If you have allergies, stay inside when pollen counts are high  Do not use aerosol products, such as spray-on deodorant, bug spray, and hair spray  · Do not smoke or be around others who smoke  Nicotine and other chemicals in cigarettes and cigars damages the cilia that move mucus out of your lungs  Ask your healthcare provider for information if you currently smoke and need help to quit  E-cigarettes or smokeless tobacco still contain nicotine  Talk to your healthcare provider before you use these products  · Drink liquids as directed  Liquids help keep your air passages moist and help you cough up mucus   You may need to drink more liquids when you have acute bronchitis  Ask how much liquid to drink each day and which liquids are best for you  · Use a humidifier or vaporizer  Use a cool mist humidifier or a vaporizer to increase air moisture in your home  This may make it easier for you to breathe and help decrease your cough  Decrease risk for acute bronchitis:   · Get the vaccinations you need  Ask your healthcare provider if you should get vaccinated against the flu or pneumonia  · Prevent the spread of germs  You can decrease your risk of acute bronchitis and other illnesses by doing the following:     [facility] your hands often with soap and water  Carry germ-killing hand lotion or gel with you  You can use the lotion or gel to clean your hands when soap and water are not available  ¨ Do not touch your eyes, nose, or mouth unless you have washed your hands first     ¨ Always cover your mouth when you cough to prevent the spread of germs  It is best to cough into a tissue or your shirt sleeve instead of into your hand  Ask those around you cover their mouths when they cough  ¨ Try to avoid people who have a cold or the flu  If you are sick, stay away from others as much as possible  Medicines: Your healthcare provider may  give you any of the following:  · Ibuprofen or acetaminophen  are medicines that help lower your fever  They are available without a doctor's order  Ask your healthcare provider which medicine is right for you  Ask how much to take and how often to take it  Follow directions  These medicines can cause stomach bleeding if not taken correctly  Ibuprofen can cause kidney damage  Do not take ibuprofen if you have kidney disease, an ulcer, or allergies to aspirin  Acetaminophen can cause liver damage  Do not take more than 4,000 milligrams in 24 hours  · Decongestants  help loosen mucus in your lungs and make it easier to cough up  This can help you breathe easier  · Cough suppressants  decrease your urge to cough   If your cough produces mucus, do not take a cough suppressant unless your healthcare provider tells you to  Your healthcare provider may suggest that you take a cough suppressant at night so you can rest     · Inhalers  may be given  Your healthcare provider may give you one or more inhalers to help you breathe easier and cough less  An inhaler gives your medicine to open your airways  Ask your healthcare provider to show you how to use your inhaler correctly  · Take your medicine as directed  Contact your healthcare provider if you think your medicine is not helping or if you have side effects  Tell him of her if you are allergic to any medicine  Keep a list of the medicines, vitamins, and herbs you take  Include the amounts, and when and why you take them  Bring the list or the pill bottles to follow-up visits  Carry your medicine list with you in case of an emergency  Follow up with your healthcare provider as directed:  Write down questions you have so you will remember to ask them during your follow-up visits  © 2017 [employer] Information is for End User's use only and may not be sold, redistributed or otherwise used for commercial purposes  All illustrations and images included in CareNotes® are the copyrighted property of [employer]  or Raymon Ryan  The above information is an  only  It is not intended as medical advice for individual conditions or treatments  Talk to your doctor, nurse or pharmacist before following any medical regimen to see if it is safe and effective for you

## 2020-01-24 NOTE — TELEPHONE ENCOUNTER
PT ALFIE   CALLED  TODAY  HAVING  ASTHMA  ISSUES   WANTED  AN  APPT  BEFORE  THE  WEEKEND    I  MADE  HER  AN  APPT  WITH  [facility]  AND  SHE  WAS  OK  WITH  THAT   JUST  WANTED  [address]en[address] Wolfe  TO  KNOW  THIS

## 2020-07-03 ENCOUNTER — APPOINTMENT (OUTPATIENT)
Dept: LAB | Facility: HOSPITAL | Age: 50
End: 2020-07-03
Attending: INTERNAL MEDICINE
Payer: COMMERCIAL

## 2020-07-03 DIAGNOSIS — E03.8 SUBCLINICAL HYPOTHYROIDISM: ICD-10-CM

## 2020-07-03 LAB
T4 FREE SERPL-MCNC: 0.88 NG/DL (ref 0.76–1.46)
TSH SERPL DL<=0.05 MIU/L-ACNC: 3.8 UIU/ML (ref 0.36–3.74)

## 2020-07-03 PROCEDURE — 84443 ASSAY THYROID STIM HORMONE: CPT

## 2020-07-03 PROCEDURE — 36415 COLL VENOUS BLD VENIPUNCTURE: CPT

## 2020-07-03 PROCEDURE — 84439 ASSAY OF FREE THYROXINE: CPT

## 2020-07-06 ENCOUNTER — OFFICE VISIT (OUTPATIENT)
Dept: INTERNAL MEDICINE CLINIC | Facility: CLINIC | Age: 50
End: 2020-07-06
Payer: COMMERCIAL

## 2020-07-06 VITALS
RESPIRATION RATE: 16 BRPM | HEIGHT: 65 IN | HEART RATE: 82 BPM | BODY MASS INDEX: 27.09 KG/M2 | WEIGHT: 162.6 LBS | TEMPERATURE: 98.8 F | DIASTOLIC BLOOD PRESSURE: 88 MMHG | SYSTOLIC BLOOD PRESSURE: 138 MMHG

## 2020-07-06 DIAGNOSIS — Z00.00 WELL ADULT EXAM: ICD-10-CM

## 2020-07-06 DIAGNOSIS — L72.0 EPIDERMAL INCLUSION CYST: Primary | ICD-10-CM

## 2020-07-06 DIAGNOSIS — E03.8 SUBCLINICAL HYPOTHYROIDISM: ICD-10-CM

## 2020-07-06 DIAGNOSIS — F41.9 ANXIETY: ICD-10-CM

## 2020-07-06 DIAGNOSIS — I10 ESSENTIAL HYPERTENSION: ICD-10-CM

## 2020-07-06 PROCEDURE — 1036F TOBACCO NON-USER: CPT | Performed by: INTERNAL MEDICINE

## 2020-07-06 PROCEDURE — 3075F SYST BP GE 130 - 139MM HG: CPT | Performed by: INTERNAL MEDICINE

## 2020-07-06 PROCEDURE — 99214 OFFICE O/P EST MOD 30 MIN: CPT | Performed by: INTERNAL MEDICINE

## 2020-07-06 PROCEDURE — 3079F DIAST BP 80-89 MM HG: CPT | Performed by: INTERNAL MEDICINE

## 2020-07-06 PROCEDURE — 3008F BODY MASS INDEX DOCD: CPT | Performed by: INTERNAL MEDICINE

## 2020-07-06 NOTE — PROGRESS NOTES
Assessment/Plan:    Diagnoses and all orders for this visit:    Epidermal inclusion cyst  -     Ambulatory referral to General Surgery; Future    Subclinical hypothyroidism  -     TSH, 3rd generation with Free T4 reflex; Future    Essential hypertension  -     CBC and differential; Future  -     Comprehensive metabolic panel; Future  -     Lipid panel; Future    Anxiety    Well adult exam  -     Hemoglobin A1C; Future         Patient Instructions     Inflamed epidermal inclusion cyst -continue warm compresses,  Patient declined incision and drainage today  It appears that it will spontaneously drain within the next several days  Referral to general surgery for definitive treatment after healing  Subclinical hypothyroidism improved, no indication for treatment    Situational anxiety -continue with propranolol as needed          Subjective:      Patient ID: Lindsey Escalante is a 44 y o  female    F/u mmp and review labs  Feeling generally well, working from home since covid and likes it  Notes recurrence cyst on back, had removed 10+ yrs ago w/ Dr Beverley Erickson  Using otc drawing salve, had rxn to one of them  HTN-home bp's 90% <130/80, had an episode of vertigo the day of her labs and bp was 157/88, then 103/68, resolved after decongestant and rest     Back to exercising elliptical or walking 30-60 min about 2-4 days per week  Propranolol only for presentations every few mos  Allergy and asthma stable using proair for colds and heavy perfume rxn etc    Subclinical hypothyroid-bowels and appetite are stable           Current Outpatient Medications:     albuterol (VENTOLIN HFA) 90 mcg/act inhaler, Inhale 2 puffs every 4 (four) hours as needed for wheezing, Disp: 1 Inhaler, Rfl: 1    benzonatate (TESSALON PERLES) 100 mg capsule, Take 1 capsule (100 mg total) by mouth 3 (three) times a day as needed for cough, Disp: 20 capsule, Rfl: 0    Cholecalciferol (VITAMIN D3) 2000 units capsule, Take by mouth daily, Disp: , Rfl:     fexofenadine (ALLEGRA ALLERGY) 60 MG tablet, Take 60 mg by mouth as needed  , Disp: , Rfl:     fluticasone (FLONASE) 50 mcg/act nasal spray, 2 sprays into each nostril as needed  , Disp: , Rfl:     ipratropium-albuterol (DUO-NEB) 0 5-2 5 mg/3 mL nebulizer solution, Take 1 vial (3 mL total) by nebulization every 4 (four) hours as needed for shortness of breath, Disp: 120 vial, Rfl: 3    propranolol (INDERAL) 10 mg tablet, Take 1 tablet by mouth 2 (two) times a day as needed, Disp: , Rfl:     Turmeric 500 MG CAPS, Take by mouth 2 (two) times a day, Disp: , Rfl:     Recent Results (from the past 1008 hour(s))   TSH, 3rd generation with Free T4 reflex    Collection Time: 07/03/20  8:09 AM   Result Value Ref Range    TSH 3RD GENERATON 3 802 (H) 0 358 - 3 740 uIU/mL   T4, free    Collection Time: 07/03/20  8:09 AM   Result Value Ref Range    Free T4 0 88 0 76 - 1 46 ng/dL       The following portions of the patient's history were reviewed and updated as appropriate: allergies, current medications, past family history, past medical history, past social history, past surgical history and problem list      Review of Systems   Constitutional: Negative for appetite change, chills, diaphoresis, fatigue, fever and unexpected weight change  HENT: Negative for congestion, hearing loss and rhinorrhea  Eyes: Negative for visual disturbance  Respiratory: Negative for cough, chest tightness, shortness of breath and wheezing  Cardiovascular: Negative for chest pain, palpitations and leg swelling  Gastrointestinal: Negative for abdominal pain and blood in stool  Endocrine: Negative for cold intolerance, heat intolerance, polydipsia and polyuria  Genitourinary: Negative for difficulty urinating, dysuria, frequency and urgency  Musculoskeletal: Negative for arthralgias and myalgias  Skin: Positive for wound  Negative for rash     Neurological: Negative for dizziness, weakness, light-headedness and headaches  Hematological: Does not bruise/bleed easily  Psychiatric/Behavioral: Negative for dysphoric mood and sleep disturbance  Objective:      Vitals:    07/06/20 1605   BP: 138/88   Pulse: 82   Resp: 16   Temp: 98 8 °F (37 1 °C)          Physical Exam   Constitutional: She is oriented to person, place, and time  She appears well-developed and well-nourished  HENT:   Head: Normocephalic and atraumatic  Nose: Nose normal    Mouth/Throat: Oropharynx is clear and moist    Eyes: Pupils are equal, round, and reactive to light  Conjunctivae and EOM are normal  No scleral icterus  Neck: Normal range of motion  Neck supple  No JVD present  No tracheal deviation present  No thyromegaly present  Cardiovascular: Normal rate, regular rhythm and intact distal pulses  Exam reveals no gallop and no friction rub  No murmur heard  Pulmonary/Chest: Effort normal and breath sounds normal  No respiratory distress  She has no wheezes  She has no rales  Musculoskeletal: She exhibits no edema or deformity  Lymphadenopathy:     She has no cervical adenopathy  Neurological: She is alert and oriented to person, place, and time  No cranial nerve deficit  Skin: Skin is warm and dry  No rash noted  No erythema  No pallor  5x5 cm indurated abscess w/ central fluctuance L upper shoulder   Psychiatric: She has a normal mood and affect   Her behavior is normal  Judgment and thought content normal

## 2020-07-06 NOTE — PATIENT INSTRUCTIONS
Inflamed epidermal inclusion cyst -continue warm compresses,  Patient declined incision and drainage today  It appears that it will spontaneously drain within the next several days  Referral to general surgery for definitive treatment after healing  Subclinical hypothyroidism improved, no indication for treatment    Situational anxiety -continue with propranolol as needed    Hypertension stable without medication    Routine follow-up after labs in 6 months, sooner as needed

## 2020-07-20 ENCOUNTER — CONSULT (OUTPATIENT)
Dept: SURGERY | Facility: CLINIC | Age: 50
End: 2020-07-20
Payer: COMMERCIAL

## 2020-07-20 VITALS
HEART RATE: 82 BPM | SYSTOLIC BLOOD PRESSURE: 150 MMHG | DIASTOLIC BLOOD PRESSURE: 98 MMHG | BODY MASS INDEX: 27.39 KG/M2 | WEIGHT: 164.4 LBS | RESPIRATION RATE: 16 BRPM | HEIGHT: 65 IN | TEMPERATURE: 98.6 F

## 2020-07-20 DIAGNOSIS — L72.0 EPIDERMAL INCLUSION CYST: ICD-10-CM

## 2020-07-20 PROCEDURE — 99203 OFFICE O/P NEW LOW 30 MIN: CPT | Performed by: SURGERY

## 2020-07-20 PROCEDURE — 3008F BODY MASS INDEX DOCD: CPT | Performed by: NURSE PRACTITIONER

## 2020-07-20 NOTE — PROGRESS NOTES
Assessment/Plan:       1  Epidermal inclusion cyst  -     Ambulatory referral to General Surgery      This appears to have resolved with spontaneous drainage  No active fluctuance or swelling to warrant intervention  Directed to followup if/when it recurs as the capsule was likely not drained  Subjective:      Patient ID: Jackelin Giron is a 44 y o  female  Triage Notes:    Ms New Dhillon is following up for a sebaceous cyst of the left shoulder  Since seeing her PCP and using warm compresses it did drain and she thinks is now healed  Her  also thought the same but she decided to keep the appointment for advice  The following portions of the patient's history were reviewed and updated as appropriate:   She  has a past medical history of Asthma, Hypoglycemia, Idiopathic angioedema, and Seizures ([facility] [address] )  She   Patient Active Problem List    Diagnosis Date Noted    White coat hypertension 01/24/2020    Subclinical hypothyroidism 11/04/2019    Special screening for malignant neoplasms, colon 12/27/2018    Mild vitamin D deficiency 09/13/2016    Asthma 09/01/2015    White coat syndrome with diagnosis of hypertension 08/25/2014    Anxiety 08/01/2014    Essential hypertension 08/01/2014     She  has a past surgical history that includes Wrist surgery and pr colonoscopy flx dx w/collj spec when pfrmd (N/A, 1/10/2019)  Her family history includes Diabetes type II in her father and mother; Hypertension in her father and mother; Other in her mother  She  reports that she quit smoking about 15 years ago  Her smoking use included cigarettes  She has a 6 00 pack-year smoking history  She has never used smokeless tobacco  She reports that she drinks about 6 0 standard drinks of alcohol per week  She reports that she does not use drugs    Current Outpatient Medications on File Prior to Visit   Medication Sig    albuterol (VENTOLIN HFA) 90 mcg/act inhaler Inhale 2 puffs every 4 (four) hours as needed for wheezing    benzonatate (TESSALON PERLES) 100 mg capsule Take 1 capsule (100 mg total) by mouth 3 (three) times a day as needed for cough    Cholecalciferol (VITAMIN D3) 2000 units capsule Take by mouth daily    fexofenadine (ALLEGRA ALLERGY) 60 MG tablet Take 60 mg by mouth as needed      fluticasone (FLONASE) 50 mcg/act nasal spray 2 sprays into each nostril as needed      ipratropium-albuterol (DUO-NEB) 0 5-2 5 mg/3 mL nebulizer solution Take 1 vial (3 mL total) by nebulization every 4 (four) hours as needed for shortness of breath    propranolol (INDERAL) 10 mg tablet Take 1 tablet by mouth 2 (two) times a day as needed    Turmeric 500 MG CAPS Take by mouth 2 (two) times a day     No current facility-administered medications on file prior to visit  She is allergic to penicillins; ciprofloxacin; dilantin [phenytoin]; phenobarbital; aspirin; cephalexin; and mephobarbital     Review of Systems   Constitutional: Negative for activity change and appetite change  HENT: Negative for congestion, hearing loss, sore throat and trouble swallowing  Eyes: Negative for discharge and visual disturbance  Respiratory: Negative for cough, chest tightness, shortness of breath and wheezing  Cardiovascular: Negative for chest pain and palpitations  Gastrointestinal: Negative for abdominal pain  Endocrine: Negative for cold intolerance and heat intolerance  Genitourinary: Negative for difficulty urinating  Musculoskeletal: Negative for gait problem  Skin: Negative for color change, rash and wound  Neurological: Negative for dizziness, speech difficulty and headaches  Psychiatric/Behavioral: Negative for behavioral problems and confusion  The patient is not nervous/anxious            Objective:      /98   Pulse 82   Temp 98 6 °F (37 °C) (Oral)   Resp 16   Ht 5' 4 8" (1 646 m)   Wt 74 6 kg (164 lb 6 4 oz)   BMI 27 53 kg/m²     Below is the patient's most recent value for Albumin, ALT, AST, BUN, Calcium, Chloride, Cholesterol, CO2, Creatinine, GFR, Glucose, HDL, Hematocrit, Hemoglobin, Hemoglobin A1C, LDL, Magnesium, Phosphorus, Platelets, Potassium, PSA, Sodium, Triglycerides, and WBC  Lab Results   Component Value Date    ALT 43 11/01/2019    AST 21 11/01/2019    BUN 19 11/01/2019    CALCIUM 8 9 11/01/2019     11/01/2019    CHOL 180 09/06/2016    CO2 30 11/01/2019    CREATININE 0 79 11/01/2019    HDL 74 11/01/2019    HCT 40 5 11/01/2019    HGB 12 8 11/01/2019     11/01/2019    K 3 9 11/01/2019     09/06/2016    TRIG 49 11/01/2019    WBC 5 38 11/01/2019     Note: for a comprehensive list of the patient's lab results, access the Results Review activity  Physical Exam   Constitutional: She is oriented to person, place, and time  She appears well-developed and well-nourished  No distress  HENT:   Head: Normocephalic and atraumatic  Nose: Nose normal    Eyes: Pupils are equal, round, and reactive to light  Conjunctivae and EOM are normal  No scleral icterus  Neck: Normal range of motion  Neck supple  Cardiovascular: Normal rate  Pulmonary/Chest: Effort normal  No respiratory distress  Abdominal: Soft  Musculoskeletal: Normal range of motion  She exhibits no edema or deformity  Neurological: She is alert and oriented to person, place, and time  No cranial nerve deficit  Skin: Skin is warm and dry  No rash noted  She is not diaphoretic  No erythema  No pallor  L upper shoulder scar without fluctuance, induration or erythema   Psychiatric: She has a normal mood and affect  Her behavior is normal  Judgment and thought content normal    Nursing note and vitals reviewed            Procedures

## 2020-07-23 ENCOUNTER — TELEMEDICINE (OUTPATIENT)
Dept: INTERNAL MEDICINE CLINIC | Facility: CLINIC | Age: 50
End: 2020-07-23
Payer: COMMERCIAL

## 2020-07-23 ENCOUNTER — TELEPHONE (OUTPATIENT)
Dept: INTERNAL MEDICINE CLINIC | Facility: CLINIC | Age: 50
End: 2020-07-23

## 2020-07-23 VITALS — SYSTOLIC BLOOD PRESSURE: 141 MMHG | DIASTOLIC BLOOD PRESSURE: 69 MMHG | HEART RATE: 84 BPM | TEMPERATURE: 95.6 F

## 2020-07-23 DIAGNOSIS — Z20.828 EXPOSURE TO SARS-ASSOCIATED CORONAVIRUS: Primary | ICD-10-CM

## 2020-07-23 DIAGNOSIS — Z20.828 EXPOSURE TO SARS-ASSOCIATED CORONAVIRUS: ICD-10-CM

## 2020-07-23 PROCEDURE — 1036F TOBACCO NON-USER: CPT | Performed by: NURSE PRACTITIONER

## 2020-07-23 PROCEDURE — 3078F DIAST BP <80 MM HG: CPT | Performed by: NURSE PRACTITIONER

## 2020-07-23 PROCEDURE — 99214 OFFICE O/P EST MOD 30 MIN: CPT | Performed by: NURSE PRACTITIONER

## 2020-07-23 PROCEDURE — U0003 INFECTIOUS AGENT DETECTION BY NUCLEIC ACID (DNA OR RNA); SEVERE ACUTE RESPIRATORY SYNDROME CORONAVIRUS 2 (SARS-COV-2) (CORONAVIRUS DISEASE [COVID-19]), AMPLIFIED PROBE TECHNIQUE, MAKING USE OF HIGH THROUGHPUT TECHNOLOGIES AS DESCRIBED BY CMS-2020-01-R: HCPCS

## 2020-07-23 PROCEDURE — 3077F SYST BP >= 140 MM HG: CPT | Performed by: NURSE PRACTITIONER

## 2020-07-23 NOTE — TELEPHONE ENCOUNTER
COVID Pre-Visit Screening     1  Is this a family member screening? No  2  Have you traveled outside of your state in the past 2 weeks? Yes: Quarantine recommendations for [place] or [place] were reviewed with patient and patient DOES NOT meet criteria for quarantine: Yes  3  Do you presently have a fever or flu-like symptoms? No  4  Do you have symptoms of an upper respiratory infection like runny nose, sore throat, or cough? Yes  5  Are you suffering from new headache that you have not had in the past?  No  6  Do you have/have you experienced any new shortness of breath recently? No  7  Do you have any new diarrhea, nausea or vomiting? No  8  Have you been in contact with anyone who has been sick or diagnosed with COVID-19? Yes  9  Do you have any new loss of taste or smell? No  10  Are you able to wear a mask without a valve for the entire visit?  Yes

## 2020-07-23 NOTE — PROGRESS NOTES
COVID-19 Virtual Visit     Assessment/Plan:    Problem List Items Addressed This Visit     None      Visit Diagnoses     Exposure to SARS-associated coronavirus    -  Primary    Relevant Orders    MISC COVID-19 TEST- Collected at [facility] or [facility]        This virtual check-in was done via sougou and patient was informed that this is not a secure, HIPAA-complaint platform  She agrees to proceed     Disposition:      I referred Nava to one of our centralized sites for a COVID-19 swab    I spent 15 minutes with patient today in which greater than 50% of the time was spent in counseling/coordination of care regarding COVID-19 testing, self isolation, symptom management    Encounter provider LEIF Martínez    Provider located at [address] [place] [address]    Recent Visits  No visits were found meeting these conditions  Showing recent visits within past 7 days and meeting all other requirements     Today's Visits  Date Type Provider Dept   07/23/20 Telemedicine [address]   07/23/20 Telephone Fiona Noon[facility] today's visits and meeting all other requirements     Future Appointments  Date Type Provider Dept   07/23/20 Telemedicine [address]   Showing future appointments within next 150 days and meeting all other requirements        Patient agrees to participate in a virtual check in via telephone or video visit instead of presenting to the office to address urgent/immediate medical needs  Patient is aware this is a billable service  After connecting through [employer], the patient was identified by name and date of birth  Ap Lan was informed that this was a telemedicine visit and that the exam was being conducted confidentially over secure lines  My office door was closed  No one else was in the room  Gilberto Gann acknowledged consent and understanding of privacy and security of the telemedicine visit  I informed the patient that I have reviewed her record in Epic and presented the opportunity for her to ask any questions regarding the visit today  The patient agreed to participate  Subjective  Gilberto Gann is a 44 y o  female who is concerned about COVID-19  The patient states she traveled to [place] the weekend of July 11th to see her daughter  She states that there was a party at [facility] they were at with multiple other people  She was there approximately 3 hours in the house  She found out today that an attendee at the party just tested positive for the corona virus  The patient herself is experiencing symptoms of postnasal drip, rhinorrhea, and chest tightness  She does have a history of asthma  She is quite anxious regarding being exposed to the corona virus  Corona virus testing was ordered for the patient  I did discussed symptom management with her  In addition I made the patient aware that she should self isolate and should not be on the community  If she needs to go out she should wear mask  These results should be back anywhere from 2-10 days  The patient has been instructed should her symptoms worsen in the next several days she should either contact the office to be evaluated in the emergency room  She reports chest tightness, post nasal drip, rhinnorhea  She has not experienced fever, cough, shortness of breath, sore throat and fatigue She has had contact with a person who is under investigation for or who is positive for COVID-19 within the last 14 days  She has not been hospitalized recently for fever and/or lower respiratory symptoms      Past Medical History:   Diagnosis Date    Asthma     Hypoglycemia     Idiopathic angioedema     Seizures (HCC)     as a baby       Past Surgical History:   Procedure Laterality Date    ND COLONOSCOPY FLX DX W/COLLJ [facility] INPATIENT REHABILITATION WHEN PFRMD N/A 1/10/2019    Procedure: COLONOSCOPY;  Surgeon: Concha Mancia MD;  Location: [facility]; Service: Gastroenterology    WRIST SURGERY      mass excision       Current Outpatient Medications   Medication Sig Dispense Refill    albuterol (VENTOLIN HFA) 90 mcg/act inhaler Inhale 2 puffs every 4 (four) hours as needed for wheezing 1 Inhaler 1    Cholecalciferol (VITAMIN D3) 2000 units capsule Take by mouth daily      fexofenadine (ALLEGRA ALLERGY) 60 MG tablet Take 60 mg by mouth as needed        fluticasone (FLONASE) 50 mcg/act nasal spray 2 sprays into each nostril as needed        ipratropium-albuterol (DUO-NEB) 0 5-2 5 mg/3 mL nebulizer solution Take 1 vial (3 mL total) by nebulization every 4 (four) hours as needed for shortness of breath 120 vial 3    propranolol (INDERAL) 10 mg tablet Take 1 tablet by mouth 2 (two) times a day as needed      Turmeric 500 MG CAPS Take by mouth 2 (two) times a day       No current facility-administered medications for this visit  Allergies   Allergen Reactions    Penicillins Hives    Ciprofloxacin Swelling    Dilantin [Phenytoin] Swelling     Other reaction(s): Unknown    Phenobarbital Swelling     Other reaction(s): Unknown    Aspirin Other (See Comments)     Other reaction(s): Unknown  Pt unsure    Cephalexin      Other reaction(s): Unknown    Mephobarbital      Other reaction(s): Unknown  Other reaction(s): Unknown       Review of Systems   Constitutional: Negative for activity change, fatigue and fever  HENT: Positive for postnasal drip, rhinorrhea and sinus pressure  Negative for congestion, hearing loss, trouble swallowing and voice change  Eyes: Negative for photophobia, pain, discharge and visual disturbance  Respiratory: Positive for chest tightness  Negative for cough and shortness of breath  Cardiovascular: Negative for chest pain, palpitations and leg swelling     Gastrointestinal: Negative for abdominal pain, blood in stool, constipation, nausea and vomiting  Endocrine: Negative for cold intolerance and heat intolerance  Genitourinary: Negative for difficulty urinating, frequency, hematuria, urgency, vaginal bleeding and vaginal discharge  Musculoskeletal: Negative for arthralgias and myalgias  Skin: Negative  Neurological: Negative for dizziness, weakness, numbness and headaches  Psychiatric/Behavioral: Negative for decreased concentration  The patient is not nervous/anxious  Video Exam    There were no vitals filed for this visit  Deb Santoyo appears healthy, alert, mild distress, cooperative  Physical Exam   Constitutional: She is oriented to person, place, and time  She appears well-developed and well-nourished  No distress  HENT:   Head: Normocephalic and atraumatic  Eyes: Pupils are equal, round, and reactive to light  EOM are normal    Neck: Normal range of motion  Pulmonary/Chest: Effort normal    Neurological: She is alert and oriented to person, place, and time  Psychiatric: She has a normal mood and affect  Her behavior is normal  Judgment and thought content normal         VIRTUAL VISIT DISCLAIMER    Marcia Aparicio acknowledges that she has consented to an online visit or consultation  She understands that the online visit is based solely on information provided by her, and that, in the absence of a face-to-face physical evaluation by the physician, the diagnosis she receives is both limited and provisional in terms of accuracy and completeness  This is not intended to replace a full medical face-to-face evaluation by the physician  Marcia Aparicio understands and accepts these terms

## 2020-07-26 LAB — SARS-COV-2 RNA SPEC QL NAA+PROBE: NOT DETECTED

## 2020-11-03 ENCOUNTER — CLINICAL SUPPORT (OUTPATIENT)
Dept: INTERNAL MEDICINE CLINIC | Facility: CLINIC | Age: 50
End: 2020-11-03
Payer: COMMERCIAL

## 2020-11-03 DIAGNOSIS — Z23 ENCOUNTER FOR IMMUNIZATION: Primary | ICD-10-CM

## 2020-11-03 PROCEDURE — 90471 IMMUNIZATION ADMIN: CPT

## 2020-11-03 PROCEDURE — 90682 RIV4 VACC RECOMBINANT DNA IM: CPT

## 2021-01-08 ENCOUNTER — TELEPHONE (OUTPATIENT)
Dept: INTERNAL MEDICINE CLINIC | Facility: CLINIC | Age: 51
End: 2021-01-08

## 2021-01-08 NOTE — TELEPHONE ENCOUNTER
PATIENT GOT NOTICE FORM HER JOB TODAY THAT SHE CAN GET THE COVID VACCINE 1/11-SHE HAS AN APPT SCHEDULED AND WOULD LIKE TO KNOW IF YOU AGREE WITH THIS?       CB # [id number]

## 2021-01-11 ENCOUNTER — IMMUNIZATIONS (OUTPATIENT)
Dept: FAMILY MEDICINE CLINIC | Facility: HOSPITAL | Age: 51
End: 2021-01-11

## 2021-01-11 DIAGNOSIS — Z23 ENCOUNTER FOR IMMUNIZATION: ICD-10-CM

## 2021-01-11 PROCEDURE — 91301 SARS-COV-2 / COVID-19 MRNA VACCINE (MODERNA) 100 MCG: CPT

## 2021-01-11 PROCEDURE — 0011A SARS-COV-2 / COVID-19 MRNA VACCINE (MODERNA) 100 MCG: CPT

## 2021-01-15 ENCOUNTER — LAB (OUTPATIENT)
Dept: LAB | Facility: HOSPITAL | Age: 51
End: 2021-01-15
Attending: INTERNAL MEDICINE
Payer: COMMERCIAL

## 2021-01-15 DIAGNOSIS — I10 ESSENTIAL HYPERTENSION: ICD-10-CM

## 2021-01-15 DIAGNOSIS — Z00.00 WELL ADULT EXAM: ICD-10-CM

## 2021-01-15 DIAGNOSIS — E03.8 SUBCLINICAL HYPOTHYROIDISM: ICD-10-CM

## 2021-01-15 LAB
ALBUMIN SERPL BCP-MCNC: 3.6 G/DL (ref 3.5–5)
ALP SERPL-CCNC: 79 U/L (ref 46–116)
ALT SERPL W P-5'-P-CCNC: 36 U/L (ref 12–78)
ANION GAP SERPL CALCULATED.3IONS-SCNC: 6 MMOL/L (ref 4–13)
AST SERPL W P-5'-P-CCNC: 18 U/L (ref 5–45)
BASOPHILS # BLD AUTO: 0.04 THOUSANDS/ΜL (ref 0–0.1)
BASOPHILS NFR BLD AUTO: 1 % (ref 0–1)
BILIRUB SERPL-MCNC: 0.3 MG/DL (ref 0.2–1)
BUN SERPL-MCNC: 15 MG/DL (ref 5–25)
CALCIUM SERPL-MCNC: 8.9 MG/DL (ref 8.3–10.1)
CHLORIDE SERPL-SCNC: 104 MMOL/L (ref 100–108)
CHOLEST SERPL-MCNC: 153 MG/DL (ref 50–200)
CO2 SERPL-SCNC: 31 MMOL/L (ref 21–32)
CREAT SERPL-MCNC: 0.88 MG/DL (ref 0.6–1.3)
EOSINOPHIL # BLD AUTO: 0.19 THOUSAND/ΜL (ref 0–0.61)
EOSINOPHIL NFR BLD AUTO: 3 % (ref 0–6)
ERYTHROCYTE [DISTWIDTH] IN BLOOD BY AUTOMATED COUNT: 13.1 % (ref 11.6–15.1)
EST. AVERAGE GLUCOSE BLD GHB EST-MCNC: 114 MG/DL
GFR SERPL CREATININE-BSD FRML MDRD: 76 ML/MIN/1.73SQ M
GLUCOSE P FAST SERPL-MCNC: 106 MG/DL (ref 65–99)
HBA1C MFR BLD: 5.6 %
HCT VFR BLD AUTO: 39.9 % (ref 34.8–46.1)
HDLC SERPL-MCNC: 72 MG/DL
HGB BLD-MCNC: 12.7 G/DL (ref 11.5–15.4)
IMM GRANULOCYTES # BLD AUTO: 0.02 THOUSAND/UL (ref 0–0.2)
IMM GRANULOCYTES NFR BLD AUTO: 0 % (ref 0–2)
LDLC SERPL CALC-MCNC: 72 MG/DL (ref 0–100)
LYMPHOCYTES # BLD AUTO: 1.74 THOUSANDS/ΜL (ref 0.6–4.47)
LYMPHOCYTES NFR BLD AUTO: 30 % (ref 14–44)
MCH RBC QN AUTO: 29 PG (ref 26.8–34.3)
MCHC RBC AUTO-ENTMCNC: 31.8 G/DL (ref 31.4–37.4)
MCV RBC AUTO: 91 FL (ref 82–98)
MONOCYTES # BLD AUTO: 0.58 THOUSAND/ΜL (ref 0.17–1.22)
MONOCYTES NFR BLD AUTO: 10 % (ref 4–12)
NEUTROPHILS # BLD AUTO: 3.25 THOUSANDS/ΜL (ref 1.85–7.62)
NEUTS SEG NFR BLD AUTO: 56 % (ref 43–75)
NONHDLC SERPL-MCNC: 81 MG/DL
NRBC BLD AUTO-RTO: 0 /100 WBCS
PLATELET # BLD AUTO: 283 THOUSANDS/UL (ref 149–390)
PMV BLD AUTO: 10.6 FL (ref 8.9–12.7)
POTASSIUM SERPL-SCNC: 3.8 MMOL/L (ref 3.5–5.3)
PROT SERPL-MCNC: 7.4 G/DL (ref 6.4–8.2)
RBC # BLD AUTO: 4.38 MILLION/UL (ref 3.81–5.12)
SODIUM SERPL-SCNC: 141 MMOL/L (ref 136–145)
T4 FREE SERPL-MCNC: 0.84 NG/DL (ref 0.76–1.46)
TRIGL SERPL-MCNC: 44 MG/DL
TSH SERPL DL<=0.05 MIU/L-ACNC: 4.13 UIU/ML (ref 0.36–3.74)
WBC # BLD AUTO: 5.82 THOUSAND/UL (ref 4.31–10.16)

## 2021-01-15 PROCEDURE — 84439 ASSAY OF FREE THYROXINE: CPT

## 2021-01-15 PROCEDURE — 36415 COLL VENOUS BLD VENIPUNCTURE: CPT

## 2021-01-15 PROCEDURE — 85025 COMPLETE CBC W/AUTO DIFF WBC: CPT

## 2021-01-15 PROCEDURE — 84443 ASSAY THYROID STIM HORMONE: CPT

## 2021-01-15 PROCEDURE — 83036 HEMOGLOBIN GLYCOSYLATED A1C: CPT

## 2021-01-15 PROCEDURE — 80053 COMPREHEN METABOLIC PANEL: CPT

## 2021-01-15 PROCEDURE — 80061 LIPID PANEL: CPT

## 2021-01-18 ENCOUNTER — TELEMEDICINE (OUTPATIENT)
Dept: INTERNAL MEDICINE CLINIC | Facility: CLINIC | Age: 51
End: 2021-01-18
Payer: COMMERCIAL

## 2021-01-18 VITALS
BODY MASS INDEX: 26.96 KG/M2 | SYSTOLIC BLOOD PRESSURE: 129 MMHG | TEMPERATURE: 98.9 F | DIASTOLIC BLOOD PRESSURE: 69 MMHG | WEIGHT: 161 LBS

## 2021-01-18 DIAGNOSIS — B34.9 VIRAL INFECTION, UNSPECIFIED: Primary | ICD-10-CM

## 2021-01-18 PROCEDURE — 99213 OFFICE O/P EST LOW 20 MIN: CPT | Performed by: INTERNAL MEDICINE

## 2021-01-18 NOTE — PROGRESS NOTES
COVID-19 Virtual Visit     Assessment/Plan:    Problem List Items Addressed This Visit     None      Visit Diagnoses     Viral infection, unspecified    -  Primary    Relevant Orders    Novel Coronavirus (Covid-19),PCR [facility] - Collected in Office         Disposition:     I recommended self-quarantine for 14 days and to call back for worsening symptoms or development of shortness of breath  I recommended the patient to come to our office to perform PCR testing for COVID-19  I have spent 12 minutes directly with the patient  Greater than 50% of this time was spent in counseling/coordination of care regarding: prognosis, instructions for management and impressions  Encounter provider Nicci Acosta MD    Provider located at [address] [place] [address]    Recent Visits  No visits were found meeting these conditions  Showing recent visits within past 7 days and meeting all other requirements     Today's Visits  Date Type Provider Dept   01/18/21 Telemedicine Nicci Acosta  [facility] today's visits and meeting all other requirements     Future Appointments  No visits were found meeting these conditions  Showing future appointments within next 150 days and meeting all other requirements      This virtual check-in was done via [employer] and patient was informed that this is not a secure, HIPAA-compliant platform  She agrees to proceed  Patient agrees to participate in a virtual check in via telephone or video visit instead of presenting to the office to address urgent/immediate medical needs  Patient is aware this is a billable service  After connecting through [facility], the patient was identified by name and date of birth  Ryanne Amaya was informed that this was a telemedicine visit and that the exam was being conducted confidentially over secure lines  My office door was closed   No one else was in the room  Van Mosqueda acknowledged consent and understanding of privacy and security of the telemedicine visit  I informed the patient that I have reviewed her record in Epic and presented the opportunity for her to ask any questions regarding the visit today  The patient agreed to participate  Subjective:   Van Mosqueda is a 44 y o  female who is concerned about COVID-19  Patient's symptoms include nasal congestion, rhinorrhea, cough, chest tightness, myalgias and headache  Patient denies fever, chills, fatigue, malaise, sore throat, anosmia, loss of taste, shortness of breath, abdominal pain, nausea, vomiting and diarrhea  Date of symptom onset: 1/17/2021    Exposure:   Contact with a person who is under investigation (PUI) for or who is positive for COVID-19 within the last 14 days?: No    Hospitalized recently for fever and/or lower respiratory symptoms?: No      Currently a healthcare worker that is involved in direct patient care?: No      Works in a special setting where the risk of COVID-19 transmission may be high? (this may include long-term care, correctional and jail facilities; homeless shelters; assisted-living facilities and group homes ): No      Resident in a special setting where the risk of COVID-19 transmission may be high? (this may include long-term care, correctional and jail facilities; homeless shelters; assisted-living facilities and group homes ): No      Allergy sx several x this month improved w/ allergy rx, but took this am and no improvement        Got moderna vaccine on 1/11    Lab Results   Component Value Date    SARSCOV2 Not Detected 07/23/2020     Past Medical History:   Diagnosis Date    Asthma     Hypoglycemia     Idiopathic angioedema     Seizures ([facility] [address] )     as a baby     Past Surgical History:   Procedure Laterality Date    [place] COLONOSCOPY FLX DX W/COLLJ [facility] WHEN PFRMD N/A 1/10/2019    Procedure: COLONOSCOPY;  Surgeon: Isabel Witt Ira Martin MD;  Location: [facility]; Service: Gastroenterology    WRIST SURGERY      mass excision     Current Outpatient Medications   Medication Sig Dispense Refill    albuterol (VENTOLIN HFA) 90 mcg/act inhaler Inhale 2 puffs every 4 (four) hours as needed for wheezing 1 Inhaler 1    Cholecalciferol (VITAMIN D3) 2000 units capsule Take by mouth daily      fexofenadine (ALLEGRA ALLERGY) 60 MG tablet Take 60 mg by mouth as needed        fluticasone (FLONASE) 50 mcg/act nasal spray 2 sprays into each nostril as needed        ipratropium-albuterol (DUO-NEB) 0 5-2 5 mg/3 mL nebulizer solution Take 1 vial (3 mL total) by nebulization every 4 (four) hours as needed for shortness of breath 120 vial 3    propranolol (INDERAL) 10 mg tablet Take 1 tablet by mouth 2 (two) times a day as needed      Turmeric 500 MG CAPS Take by mouth 2 (two) times a day       No current facility-administered medications for this visit  Allergies   Allergen Reactions    Penicillins Hives    Ciprofloxacin Swelling    Dilantin [Phenytoin] Swelling     Other reaction(s): Unknown    Phenobarbital Swelling     Other reaction(s): Unknown    Aspirin Other (See Comments)     Other reaction(s): Unknown  Pt unsure    Cephalexin      Other reaction(s): Unknown    Mephobarbital      Other reaction(s): Unknown  Other reaction(s): Unknown       Review of Systems   Constitutional: Negative for chills, fatigue and fever  HENT: Positive for congestion and rhinorrhea  Negative for sore throat  Respiratory: Positive for cough and chest tightness  Negative for shortness of breath  Gastrointestinal: Negative for abdominal pain, diarrhea, nausea and vomiting  Musculoskeletal: Positive for myalgias  Neurological: Positive for headaches       Objective:    Vitals:    01/18/21 1836   BP: 129/69   Temp: 98 9 °F (37 2 °C)   Weight: 73 kg (161 lb)       Physical Exam  VIRTUAL VISIT DISCLAIMER    Delfin Terrell acknowledges that she has consented to an online visit or consultation  She understands that the online visit is based solely on information provided by her, and that, in the absence of a face-to-face physical evaluation by the physician, the diagnosis she receives is both limited and provisional in terms of accuracy and completeness  This is not intended to replace a full medical face-to-face evaluation by the physician  Glen Rodrigez understands and accepts these terms

## 2021-01-19 PROCEDURE — U0005 INFEC AGEN DETEC AMPLI PROBE: HCPCS | Performed by: INTERNAL MEDICINE

## 2021-01-19 PROCEDURE — U0003 INFECTIOUS AGENT DETECTION BY NUCLEIC ACID (DNA OR RNA); SEVERE ACUTE RESPIRATORY SYNDROME CORONAVIRUS 2 (SARS-COV-2) (CORONAVIRUS DISEASE [COVID-19]), AMPLIFIED PROBE TECHNIQUE, MAKING USE OF HIGH THROUGHPUT TECHNOLOGIES AS DESCRIBED BY CMS-2020-01-R: HCPCS | Performed by: INTERNAL MEDICINE

## 2021-01-20 ENCOUNTER — TELEPHONE (OUTPATIENT)
Dept: INTERNAL MEDICINE CLINIC | Facility: CLINIC | Age: 51
End: 2021-01-20

## 2021-01-20 LAB — SARS-COV-2 RNA RESP QL NAA+PROBE: POSITIVE

## 2021-01-20 NOTE — TELEPHONE ENCOUNTER
----- Message from Carlos Guillermo MD sent at 1/20/2021  3:58 PM EST -----  Please add to my schedule for COVID follow-up tomorrow

## 2021-01-21 ENCOUNTER — TELEMEDICINE (OUTPATIENT)
Dept: INTERNAL MEDICINE CLINIC | Facility: CLINIC | Age: 51
End: 2021-01-21
Payer: COMMERCIAL

## 2021-01-21 VITALS — DIASTOLIC BLOOD PRESSURE: 80 MMHG | SYSTOLIC BLOOD PRESSURE: 128 MMHG | TEMPERATURE: 96.2 F

## 2021-01-21 DIAGNOSIS — U07.1 COVID-19: Primary | ICD-10-CM

## 2021-01-21 PROCEDURE — 3074F SYST BP LT 130 MM HG: CPT | Performed by: INTERNAL MEDICINE

## 2021-01-21 PROCEDURE — 3079F DIAST BP 80-89 MM HG: CPT | Performed by: INTERNAL MEDICINE

## 2021-01-21 PROCEDURE — 99213 OFFICE O/P EST LOW 20 MIN: CPT | Performed by: INTERNAL MEDICINE

## 2021-01-21 NOTE — PROGRESS NOTES
COVID-19 Virtual Visit     Assessment/Plan:    Problem List Items Addressed This Visit     None         Disposition:     I recommended continued isolation until at least 24 hours have passed since recovery defined as resolution of fever without the use of fever-reducing medications AND improvement in COVID symptoms AND 10 days have passed since onset of symptoms (or 10 days have passed since date of first positive viral diagnostic test for asymptomatic patients)  I have spent 16 minutes directly with the patient  Greater than 50% of this time was spent in counseling/coordination of care regarding: diagnostic results, prognosis, instructions for management, patient and family education, risk factor reductions and impressions  Encounter provider Ronal Mas MD    Provider located at [address] [place] [address]    Recent Visits  Date Type Provider Dept   01/20/21 Telephone [address]   [facility] recent visits within past 7 days and meeting all other requirements     Today's Visits  Date Type Provider Dept   01/21/21 Telemedicine MD Abdullahi Martini 7 today's visits and meeting all other requirements     Future Appointments  No visits were found meeting these conditions  Showing future appointments within next 150 days and meeting all other requirements      This virtual check-in was done via [employer] and patient was informed that this is not a secure, HIPAA-compliant platform  She agrees to proceed  Patient agrees to participate in a virtual check in via telephone or video visit instead of presenting to the office to address urgent/immediate medical needs  Patient is aware this is a billable service      After connecting through [facility], the patient was identified by name and date of birth  Maggi Moncada was informed that this was a telemedicine visit and that the exam was being conducted confidentially over secure lines  Maggi Moncada acknowledged consent and understanding of privacy and security of the telemedicine visit  I informed the patient that I have reviewed her record in Epic and presented the opportunity for her to ask any questions regarding the visit today  The patient agreed to participate  Subjective:   Magig Moncada is a 44 y o  female who has been screened for COVID-19  Symptom change since last report: improving  Patient's symptoms include fatigue, cough, shortness of breath, chest tightness, diarrhea and myalgias  Patient denies fever, chills, malaise, congestion, rhinorrhea, sore throat, anosmia, loss of taste, abdominal pain, nausea, vomiting and headaches  Raz Whitaker has been staying home and has isolated themselves in her home  She is taking care to not share personal items and is cleaning all surfaces that are touched often, like counters, tabletops, and doorknobs using household cleaning sprays or wipes  She is wearing a mask when she leaves her room  Date of symptom onset: 1/17/2021  Date of positive COVID-19 PCR: 1/19/2021    Lab Results   Component Value Date    SARSCOV2 Positive (A) 01/19/2021    SARSCOV2 Not Detected 07/23/2020     Past Medical History:   Diagnosis Date    Asthma     Hypoglycemia     Idiopathic angioedema     Seizures ([address] )     as a baby     Past Surgical History:   Procedure Laterality Date    [place] COLONOSCOPY FLX DX W/COLLJ SPEC WHEN PFRMD N/A 1/10/2019    Procedure: COLONOSCOPY;  Surgeon: Ld Tovar MD;  Location: [facility];   Service: Gastroenterology    WRIST SURGERY      mass excision     Current Outpatient Medications   Medication Sig Dispense Refill    albuterol (VENTOLIN HFA) 90 mcg/act inhaler Inhale 2 puffs every 4 (four) hours as needed for wheezing 1 Inhaler 1    Cholecalciferol (VITAMIN D3) 2000 units capsule Take by mouth daily      fexofenadine (ALLEGRA ALLERGY) 60 MG tablet Take 60 mg by mouth as needed        fluticasone (FLONASE) 50 mcg/act nasal spray 2 sprays into each nostril as needed        ipratropium-albuterol (DUO-NEB) 0 5-2 5 mg/3 mL nebulizer solution Take 1 vial (3 mL total) by nebulization every 4 (four) hours as needed for shortness of breath 120 vial 3    propranolol (INDERAL) 10 mg tablet Take 1 tablet by mouth 2 (two) times a day as needed      Turmeric 500 MG CAPS Take by mouth 2 (two) times a day       No current facility-administered medications for this visit  Allergies   Allergen Reactions    Penicillins Hives    Ciprofloxacin Swelling    Dilantin [Phenytoin] Swelling     Other reaction(s): Unknown    Phenobarbital Swelling     Other reaction(s): Unknown    Aspirin Other (See Comments)     Other reaction(s): Unknown  Pt unsure    Cephalexin      Other reaction(s): Unknown    Mephobarbital      Other reaction(s): Unknown  Other reaction(s): Unknown       Review of Systems   Constitutional: Positive for fatigue  Negative for appetite change, chills, diaphoresis, fever and unexpected weight change  HENT: Negative for congestion, hearing loss, rhinorrhea and sore throat  Eyes: Negative for visual disturbance  Respiratory: Positive for cough, chest tightness and shortness of breath  Negative for wheezing  Cardiovascular: Negative for chest pain, palpitations and leg swelling  Gastrointestinal: Positive for diarrhea  Negative for abdominal pain, blood in stool, nausea and vomiting  Endocrine: Negative for cold intolerance, heat intolerance, polydipsia and polyuria  Genitourinary: Negative for difficulty urinating, dysuria, frequency and urgency  Musculoskeletal: Positive for myalgias  Negative for arthralgias  Skin: Negative for rash  Neurological: Negative for dizziness, weakness, light-headedness and headaches     Hematological: Does not bruise/bleed easily  Psychiatric/Behavioral: Negative for dysphoric mood and sleep disturbance  Objective:    Vitals:    01/21/21 1234   BP: 128/80   Temp: (!) 96 2 °F (35 7 °C)       Physical Exam  Constitutional:       Appearance: She is well-developed  HENT:      Head: Normocephalic and atraumatic  Pulmonary:      Effort: Pulmonary effort is normal    Neurological:      Mental Status: She is oriented to person, place, and time  Psychiatric:         Behavior: Behavior normal  Behavior is cooperative  Thought Content: Thought content normal          Judgment: Judgment normal        VIRTUAL VISIT DISCLAIMER    Glen Rodrigez acknowledges that she has consented to an online visit or consultation  She understands that the online visit is based solely on information provided by her, and that, in the absence of a face-to-face physical evaluation by the physician, the diagnosis she receives is both limited and provisional in terms of accuracy and completeness  This is not intended to replace a full medical face-to-face evaluation by the physician  Glen Rodrigez understands and accepts these terms

## 2021-01-22 ENCOUNTER — TELEPHONE (OUTPATIENT)
Dept: INTERNAL MEDICINE CLINIC | Facility: CLINIC | Age: 51
End: 2021-01-22

## 2021-01-22 NOTE — TELEPHONE ENCOUNTER
Patient couldn't remember how much zinc you told her to take a day from yesterday when she had her visit   She can be reached at [phone]

## 2021-02-03 ENCOUNTER — TELEMEDICINE (OUTPATIENT)
Dept: INTERNAL MEDICINE CLINIC | Facility: CLINIC | Age: 51
End: 2021-02-03
Payer: COMMERCIAL

## 2021-02-03 VITALS — DIASTOLIC BLOOD PRESSURE: 85 MMHG | SYSTOLIC BLOOD PRESSURE: 138 MMHG

## 2021-02-03 DIAGNOSIS — U07.1 COVID-19: Primary | ICD-10-CM

## 2021-02-03 PROCEDURE — 99213 OFFICE O/P EST LOW 20 MIN: CPT | Performed by: INTERNAL MEDICINE

## 2021-02-03 NOTE — PROGRESS NOTES
COVID-19 Virtual Visit     Assessment/Plan:    Problem List Items Addressed This Visit     None         Disposition:     I recommended continued isolation until at least 24 hours have passed since recovery defined as resolution of fever without the use of fever-reducing medications AND improvement in COVID symptoms AND 10 days have passed since onset of symptoms (or 10 days have passed since date of first positive viral diagnostic test for asymptomatic patients)  Patient was encouraged to get home pulse oximeter  If not available, then I have encouraged her to schedule follow-up with me in the office for ambulatory pulse ox evaluation  Continue nebulizers and antihistamine and Flonase as needed  Okay to continue vitamin D, zinc and vitamin-C  There are no contraindications to having her vaccine next week but it is also okay to put it off for another week or 2 if she is not feeling back to baseline  I have spent 15 minutes directly with the patient  Greater than 50% of this time was spent in counseling/coordination of care regarding: prognosis, risks and benefits of treatment options, patient and family education, risk factor reductions and impressions  Encounter provider Madeleine Murillo MD    Provider located at [address] [place] [address]    Recent Visits  No visits were found meeting these conditions  Showing recent visits within past 7 days and meeting all other requirements     Today's Visits  Date Type Provider Dept   02/03/21 Telemedicine Madeleine Murillo MD [address] [place] [address] today's visits and meeting all other requirements     Future Appointments  No visits were found meeting these conditions     Showing future appointments within next 150 days and meeting all other requirements      This virtual check-in was done via [employer] and patient was informed that this is not a secure, HIPAA-compliant platform  She agrees to proceed  Patient agrees to participate in a virtual check in via telephone or video visit instead of presenting to the office to address urgent/immediate medical needs  Patient is aware this is a billable service  After connecting through [facility], the patient was identified by name and date of birth  Reina Fuller was informed that this was a telemedicine visit and that the exam was being conducted confidentially over secure lines  Reina Fuller acknowledged consent and understanding of privacy and security of the telemedicine visit  I informed the patient that I have reviewed her record in Epic and presented the opportunity for her to ask any questions regarding the visit today  The patient agreed to participate  Subjective:   Reina Fuller is a 44 y o  female who has been screened for COVID-19  Symptom change since last report: improving  Patient's symptoms include cough, shortness of breath and chest tightness  Patient denies fever, chills, fatigue, malaise, congestion, rhinorrhea, sore throat, anosmia, loss of taste, abdominal pain, nausea, vomiting, diarrhea, myalgias and headaches  Date of symptom onset: 1/17/2021  Date of positive COVID-19 PCR: 1/19/2021     She is having persistent shortness of breath, mostly exertion related dyspnea like climbing stairs that resolves pretty quickly  She has not minimal nonproductive cough  She gets some improvement from nebulizer and inhaler and does not feel that she is wheezing  She does not feel she needs steroids at this time she is just concerned with duration of symptoms    She is also questioning whether she should receive her 2nd dose of vaccine as scheduled on February 9th    Lab Results   Component Value Date    SARSCOV2 Positive (A) 01/19/2021    [facility] Not Detected 07/23/2020     Past Medical History:   Diagnosis Date    Asthma     Hypoglycemia     Idiopathic angioedema     Seizures ([facility] [address] ) as a baby     Past Surgical History:   Procedure Laterality Date    [place] COLONOSCOPY FLX DX W/COLLJ SPEC WHEN PFRMD N/A 1/10/2019    Procedure: COLONOSCOPY;  Surgeon: Fan Yao MD;  [facility]: [facility]; Service: Gastroenterology    WRIST SURGERY      mass excision     Current Outpatient Medications   Medication Sig Dispense Refill    albuterol (VENTOLIN HFA) 90 mcg/act inhaler Inhale 2 puffs every 4 (four) hours as needed for wheezing 1 Inhaler 1    Cholecalciferol (VITAMIN D3) 2000 units capsule Take by mouth daily      fexofenadine (ALLEGRA ALLERGY) 60 MG tablet Take 60 mg by mouth as needed        fluticasone (FLONASE) 50 mcg/act nasal spray 2 sprays into each nostril as needed        ipratropium-albuterol (DUO-NEB) 0 5-2 5 mg/3 mL nebulizer solution Take 1 vial (3 mL total) by nebulization every 4 (four) hours as needed for shortness of breath 120 vial 3    propranolol (INDERAL) 10 mg tablet Take 1 tablet by mouth 2 (two) times a day as needed      Turmeric 500 MG CAPS Take by mouth 2 (two) times a day       No current facility-administered medications for this visit  Allergies   Allergen Reactions    Penicillins Hives    Ciprofloxacin Swelling    Dilantin [Phenytoin] Swelling     Other reaction(s): Unknown    Phenobarbital Swelling     Other reaction(s): Unknown    Aspirin Other (See Comments)     Other reaction(s): Unknown  Pt unsure    Cephalexin      Other reaction(s): Unknown    Mephobarbital      Other reaction(s): Unknown  Other reaction(s): Unknown       Review of Systems   Constitutional: Negative for appetite change, chills, diaphoresis, fatigue, fever and unexpected weight change  HENT: Negative for congestion, hearing loss, rhinorrhea and sore throat  Eyes: Negative for visual disturbance  Respiratory: Positive for cough, chest tightness and shortness of breath  Negative for wheezing  Cardiovascular: Negative for chest pain, palpitations and leg swelling  Gastrointestinal: Negative for abdominal pain, blood in stool, diarrhea, nausea and vomiting  Endocrine: Negative for cold intolerance, heat intolerance, polydipsia and polyuria  Genitourinary: Negative for difficulty urinating, dysuria, frequency and urgency  Musculoskeletal: Negative for arthralgias and myalgias  Skin: Negative for rash  Neurological: Negative for dizziness, weakness, light-headedness and headaches  Hematological: Does not bruise/bleed easily  Psychiatric/Behavioral: Negative for dysphoric mood and sleep disturbance  Objective:    Vitals:    02/03/21 1430   BP: 138/85       Physical Exam  Constitutional:       Appearance: She is well-developed  HENT:      Head: Normocephalic and atraumatic  Pulmonary:      Effort: Pulmonary effort is normal    Neurological:      Mental Status: She is oriented to person, place, and time  Psychiatric:         Behavior: Behavior normal  Behavior is cooperative  Thought Content: Thought content normal          Judgment: Judgment normal        VIRTUAL VISIT DISCLAIMER    Reina Fuller acknowledges that she has consented to an online visit or consultation  She understands that the online visit is based solely on information provided by her, and that, in the absence of a face-to-face physical evaluation by the physician, the diagnosis she receives is both limited and provisional in terms of accuracy and completeness  This is not intended to replace a full medical face-to-face evaluation by the physician  Reina Fuller understands and accepts these terms

## 2021-02-09 ENCOUNTER — IMMUNIZATIONS (OUTPATIENT)
Dept: FAMILY MEDICINE CLINIC | Facility: HOSPITAL | Age: 51
End: 2021-02-09

## 2021-02-09 DIAGNOSIS — Z23 ENCOUNTER FOR IMMUNIZATION: Primary | ICD-10-CM

## 2021-02-09 PROCEDURE — 91301 SARS-COV-2 / COVID-19 MRNA VACCINE (MODERNA) 100 MCG: CPT

## 2021-02-09 PROCEDURE — 0012A SARS-COV-2 / COVID-19 MRNA VACCINE (MODERNA) 100 MCG: CPT

## 2021-11-02 ENCOUNTER — TELEPHONE (OUTPATIENT)
Dept: INTERNAL MEDICINE CLINIC | Facility: CLINIC | Age: 51
End: 2021-11-02

## 2021-11-04 ENCOUNTER — APPOINTMENT (OUTPATIENT)
Dept: LAB | Facility: HOSPITAL | Age: 51
End: 2021-11-04
Payer: COMMERCIAL

## 2021-11-04 DIAGNOSIS — I10 ESSENTIAL HYPERTENSION: ICD-10-CM

## 2021-11-04 DIAGNOSIS — R73.01 IFG (IMPAIRED FASTING GLUCOSE): ICD-10-CM

## 2021-11-04 DIAGNOSIS — E55.9 VITAMIN D DEFICIENCY: ICD-10-CM

## 2021-11-04 DIAGNOSIS — E03.8 SUBCLINICAL HYPOTHYROIDISM: ICD-10-CM

## 2021-11-04 LAB
25(OH)D3 SERPL-MCNC: 20.6 NG/ML (ref 30–100)
ALBUMIN SERPL BCP-MCNC: 3.7 G/DL (ref 3.5–5)
ALP SERPL-CCNC: 81 U/L (ref 46–116)
ALT SERPL W P-5'-P-CCNC: 45 U/L (ref 12–78)
ANION GAP SERPL CALCULATED.3IONS-SCNC: 9 MMOL/L (ref 4–13)
AST SERPL W P-5'-P-CCNC: 20 U/L (ref 5–45)
BASOPHILS # BLD AUTO: 0.04 THOUSANDS/ΜL (ref 0–0.1)
BASOPHILS NFR BLD AUTO: 1 % (ref 0–1)
BILIRUB SERPL-MCNC: 0.43 MG/DL (ref 0.2–1)
BUN SERPL-MCNC: 22 MG/DL (ref 5–25)
CALCIUM SERPL-MCNC: 8.6 MG/DL (ref 8.3–10.1)
CHLORIDE SERPL-SCNC: 105 MMOL/L (ref 100–108)
CHOLEST SERPL-MCNC: 207 MG/DL (ref 50–200)
CO2 SERPL-SCNC: 28 MMOL/L (ref 21–32)
CREAT SERPL-MCNC: 0.93 MG/DL (ref 0.6–1.3)
EOSINOPHIL # BLD AUTO: 0.22 THOUSAND/ΜL (ref 0–0.61)
EOSINOPHIL NFR BLD AUTO: 4 % (ref 0–6)
ERYTHROCYTE [DISTWIDTH] IN BLOOD BY AUTOMATED COUNT: 13.4 % (ref 11.6–15.1)
EST. AVERAGE GLUCOSE BLD GHB EST-MCNC: 114 MG/DL
GFR SERPL CREATININE-BSD FRML MDRD: 71 ML/MIN/1.73SQ M
GLUCOSE P FAST SERPL-MCNC: 97 MG/DL (ref 65–99)
HBA1C MFR BLD: 5.6 %
HCT VFR BLD AUTO: 38.6 % (ref 34.8–46.1)
HDLC SERPL-MCNC: 77 MG/DL
HGB BLD-MCNC: 12.6 G/DL (ref 11.5–15.4)
IMM GRANULOCYTES # BLD AUTO: 0.02 THOUSAND/UL (ref 0–0.2)
IMM GRANULOCYTES NFR BLD AUTO: 0 % (ref 0–2)
LDLC SERPL CALC-MCNC: 113 MG/DL (ref 0–100)
LYMPHOCYTES # BLD AUTO: 2.1 THOUSANDS/ΜL (ref 0.6–4.47)
LYMPHOCYTES NFR BLD AUTO: 39 % (ref 14–44)
MCH RBC QN AUTO: 30 PG (ref 26.8–34.3)
MCHC RBC AUTO-ENTMCNC: 32.6 G/DL (ref 31.4–37.4)
MCV RBC AUTO: 92 FL (ref 82–98)
MONOCYTES # BLD AUTO: 0.51 THOUSAND/ΜL (ref 0.17–1.22)
MONOCYTES NFR BLD AUTO: 9 % (ref 4–12)
NEUTROPHILS # BLD AUTO: 2.52 THOUSANDS/ΜL (ref 1.85–7.62)
NEUTS SEG NFR BLD AUTO: 47 % (ref 43–75)
NONHDLC SERPL-MCNC: 130 MG/DL
NRBC BLD AUTO-RTO: 0 /100 WBCS
PLATELET # BLD AUTO: 278 THOUSANDS/UL (ref 149–390)
PMV BLD AUTO: 9.9 FL (ref 8.9–12.7)
POTASSIUM SERPL-SCNC: 4.2 MMOL/L (ref 3.5–5.3)
PROT SERPL-MCNC: 7.2 G/DL (ref 6.4–8.2)
RBC # BLD AUTO: 4.2 MILLION/UL (ref 3.81–5.12)
SODIUM SERPL-SCNC: 142 MMOL/L (ref 136–145)
T4 FREE SERPL-MCNC: 0.8 NG/DL (ref 0.76–1.46)
TRIGL SERPL-MCNC: 84 MG/DL
TSH SERPL DL<=0.05 MIU/L-ACNC: 4.94 UIU/ML (ref 0.36–3.74)
WBC # BLD AUTO: 5.41 THOUSAND/UL (ref 4.31–10.16)

## 2021-11-04 PROCEDURE — 80053 COMPREHEN METABOLIC PANEL: CPT

## 2021-11-04 PROCEDURE — 82306 VITAMIN D 25 HYDROXY: CPT

## 2021-11-04 PROCEDURE — 84439 ASSAY OF FREE THYROXINE: CPT

## 2021-11-04 PROCEDURE — 84443 ASSAY THYROID STIM HORMONE: CPT

## 2021-11-04 PROCEDURE — 80061 LIPID PANEL: CPT

## 2021-11-04 PROCEDURE — 83036 HEMOGLOBIN GLYCOSYLATED A1C: CPT

## 2021-11-04 PROCEDURE — 36415 COLL VENOUS BLD VENIPUNCTURE: CPT

## 2021-11-04 PROCEDURE — 85025 COMPLETE CBC W/AUTO DIFF WBC: CPT

## 2021-11-09 ENCOUNTER — OFFICE VISIT (OUTPATIENT)
Dept: INTERNAL MEDICINE CLINIC | Facility: CLINIC | Age: 51
End: 2021-11-09
Payer: COMMERCIAL

## 2021-11-09 VITALS
DIASTOLIC BLOOD PRESSURE: 88 MMHG | BODY MASS INDEX: 28.32 KG/M2 | HEIGHT: 65 IN | WEIGHT: 170 LBS | OXYGEN SATURATION: 98 % | TEMPERATURE: 98.9 F | HEART RATE: 94 BPM | SYSTOLIC BLOOD PRESSURE: 138 MMHG

## 2021-11-09 DIAGNOSIS — Z12.4 SCREENING FOR CERVICAL CANCER: ICD-10-CM

## 2021-11-09 DIAGNOSIS — Z23 NEED FOR VACCINATION: Primary | ICD-10-CM

## 2021-11-09 DIAGNOSIS — J45.20 MILD INTERMITTENT ASTHMA, UNSPECIFIED WHETHER COMPLICATED: ICD-10-CM

## 2021-11-09 DIAGNOSIS — Z13.6 SCREENING FOR CARDIOVASCULAR CONDITION: ICD-10-CM

## 2021-11-09 DIAGNOSIS — Z11.4 SCREENING FOR HIV (HUMAN IMMUNODEFICIENCY VIRUS): ICD-10-CM

## 2021-11-09 DIAGNOSIS — R73.01 IFG (IMPAIRED FASTING GLUCOSE): ICD-10-CM

## 2021-11-09 DIAGNOSIS — E03.8 SUBCLINICAL HYPOTHYROIDISM: ICD-10-CM

## 2021-11-09 DIAGNOSIS — Z11.59 NEED FOR HEPATITIS C SCREENING TEST: ICD-10-CM

## 2021-11-09 DIAGNOSIS — F41.9 ANXIETY: ICD-10-CM

## 2021-11-09 DIAGNOSIS — I10 ESSENTIAL HYPERTENSION: ICD-10-CM

## 2021-11-09 DIAGNOSIS — Z23 ENCOUNTER FOR IMMUNIZATION: ICD-10-CM

## 2021-11-09 DIAGNOSIS — E55.9 MILD VITAMIN D DEFICIENCY: ICD-10-CM

## 2021-11-09 PROCEDURE — 1036F TOBACCO NON-USER: CPT | Performed by: NURSE PRACTITIONER

## 2021-11-09 PROCEDURE — 90682 RIV4 VACC RECOMBINANT DNA IM: CPT | Performed by: NURSE PRACTITIONER

## 2021-11-09 PROCEDURE — 90471 IMMUNIZATION ADMIN: CPT | Performed by: NURSE PRACTITIONER

## 2021-11-09 PROCEDURE — 3008F BODY MASS INDEX DOCD: CPT | Performed by: NURSE PRACTITIONER

## 2021-11-09 PROCEDURE — 3725F SCREEN DEPRESSION PERFORMED: CPT | Performed by: NURSE PRACTITIONER

## 2021-11-09 PROCEDURE — 99396 PREV VISIT EST AGE 40-64: CPT | Performed by: NURSE PRACTITIONER

## 2021-11-09 RX ORDER — ESCITALOPRAM OXALATE 5 MG/1
TABLET ORAL
Qty: 30 TABLET | Refills: 1 | Status: SHIPPED | OUTPATIENT
Start: 2021-11-09

## 2021-11-12 ENCOUNTER — TELEPHONE (OUTPATIENT)
Dept: INTERNAL MEDICINE CLINIC | Facility: CLINIC | Age: 51
End: 2021-11-12

## 2021-11-12 PROCEDURE — U0003 INFECTIOUS AGENT DETECTION BY NUCLEIC ACID (DNA OR RNA); SEVERE ACUTE RESPIRATORY SYNDROME CORONAVIRUS 2 (SARS-COV-2) (CORONAVIRUS DISEASE [COVID-19]), AMPLIFIED PROBE TECHNIQUE, MAKING USE OF HIGH THROUGHPUT TECHNOLOGIES AS DESCRIBED BY CMS-2020-01-R: HCPCS | Performed by: INTERNAL MEDICINE

## 2021-11-12 PROCEDURE — U0005 INFEC AGEN DETEC AMPLI PROBE: HCPCS | Performed by: INTERNAL MEDICINE

## 2021-12-01 ENCOUNTER — IMMUNIZATIONS (OUTPATIENT)
Dept: FAMILY MEDICINE CLINIC | Facility: HOSPITAL | Age: 51
End: 2021-12-01

## 2021-12-01 DIAGNOSIS — Z23 ENCOUNTER FOR IMMUNIZATION: Primary | ICD-10-CM

## 2021-12-01 PROCEDURE — 0064A COVID-19 MODERNA VACC 0.25 ML BOOSTER: CPT

## 2021-12-01 PROCEDURE — 91306 COVID-19 MODERNA VACC 0.25 ML BOOSTER: CPT

## 2022-04-13 ENCOUNTER — TELEPHONE (OUTPATIENT)
Dept: INTERNAL MEDICINE CLINIC | Facility: CLINIC | Age: 52
End: 2022-04-13

## 2022-04-13 NOTE — TELEPHONE ENCOUNTER
YESTERDAY   SHE  FELL  DOWN  SOME  STAIRS  WANTS  TO  KNOW  IF   ALFIE   WOULD  SEE  HER  TODAY  AND  ORDER  SOME  XRAYS

## 2022-04-14 ENCOUNTER — OFFICE VISIT (OUTPATIENT)
Dept: INTERNAL MEDICINE CLINIC | Facility: CLINIC | Age: 52
End: 2022-04-14
Payer: OTHER MISCELLANEOUS

## 2022-04-14 VITALS
DIASTOLIC BLOOD PRESSURE: 82 MMHG | SYSTOLIC BLOOD PRESSURE: 114 MMHG | WEIGHT: 161.6 LBS | RESPIRATION RATE: 16 BRPM | BODY MASS INDEX: 26.92 KG/M2 | HEART RATE: 84 BPM | HEIGHT: 65 IN

## 2022-04-14 DIAGNOSIS — M54.2 CERVICALGIA: ICD-10-CM
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
## 2022-04-14 DIAGNOSIS — R51.9 NONINTRACTABLE HEADACHE, UNSPECIFIED CHRONICITY PATTERN, UNSPECIFIED HEADACHE TYPE: ICD-10-CM

## 2022-04-14 DIAGNOSIS — M53.3 COCCYDYNIA: ICD-10-CM

## 2022-04-14 PROCEDURE — 99213 OFFICE O/P EST LOW 20 MIN: CPT | Performed by: INTERNAL MEDICINE

## 2022-04-14 NOTE — PROGRESS NOTES
Assessment/Plan:    Diagnoses and all orders for this visit:    Fall down stairs, initial encounter    Nonintractable headache, unspecified chronicity pattern, unspecified headache type    Cervicalgia    Coccydynia              Patient Instructions   I explained my expectation is that these are soft tissue injuries that should improve gradually over the next several days to weeks  I expect the coccyx pain to likely take more than 2 weeks to completely resolve  I explained that none of these injuries appear to warrant x-rays now because even if there is occult nondisplaced fracture it would not change our management at this time  Patient is encouraged to contact me if symptoms worsen or if they fail to improve as outlined which would prompt further workup  Continue with over-the-counter analgesics, heating pads, consider topical anti-inflammatory such as Voltaren  Subjective:      Patient ID: Jarocho Gomez is a 46 y o  female    Patient presents acutely with concerns for headache, neck, back and coccygeal pain status post fall down the stairs that occurred 2 days ago  She was staying at a hotel for a conference  She started walking down the stairs and there was a slippery substance on the stairs and she quickly slipped without exactly knowing what was happening and tried her best not to hit her head and went down half a flight of stairs  She had immediate pain in the coccyx and back region and as the evening progressed she had progressively worsening neck and headache  She does not recall striking the head  There was no loss of consciousness  There has been no subsequent nausea, vomiting, confusion, visual disturbance or focal numbness, tingling or weakness  She has been using Tylenol for the pain  She notes decreased range of motion of the neck    She went to a local hospital initially to be evaluated however it was creepy and in a bad section of town so she decided to wait and come here instead  Current Outpatient Medications:     albuterol (VENTOLIN HFA) 90 mcg/act inhaler, Inhale 2 puffs every 4 (four) hours as needed for wheezing, Disp: 1 Inhaler, Rfl: 1    Cholecalciferol (VITAMIN D3) 2000 units capsule, Take by mouth daily, Disp: , Rfl:     fexofenadine (ALLEGRA ALLERGY) 60 MG tablet, Take 60 mg by mouth as needed  , Disp: , Rfl:     fluticasone (FLONASE) 50 mcg/act nasal spray, 2 sprays into each nostril as needed  , Disp: , Rfl:     ipratropium-albuterol (DUO-NEB) 0 5-2 5 mg/3 mL nebulizer solution, Take 1 vial (3 mL total) by nebulization every 4 (four) hours as needed for shortness of breath, Disp: 120 vial, Rfl: 3    propranolol (INDERAL) 10 mg tablet, Take 1 tablet by mouth 2 (two) times a day as needed, Disp: , Rfl:     Turmeric 500 MG CAPS, Take by mouth 2 (two) times a day, Disp: , Rfl:     escitalopram (LEXAPRO) 5 mg tablet, Take 1/2 tablet at bedtime (Patient not taking: Reported on 4/14/2022 ), Disp: 30 tablet, Rfl: 1    No results found for this or any previous visit (from the past 1008 hour(s))  The following portions of the patient's history were reviewed and updated as appropriate: allergies, current medications, past family history, past medical history, past social history, past surgical history and problem list      Review of Systems   Constitutional: Negative for appetite change, chills, diaphoresis, fatigue, fever and unexpected weight change  HENT: Negative for congestion, hearing loss and rhinorrhea  Eyes: Negative for visual disturbance  Respiratory: Negative for cough, chest tightness, shortness of breath and wheezing  Cardiovascular: Negative for chest pain, palpitations and leg swelling  Gastrointestinal: Negative for abdominal pain and blood in stool  Endocrine: Negative for cold intolerance, heat intolerance, polydipsia and polyuria  Genitourinary: Negative for difficulty urinating, dysuria, frequency and urgency  Musculoskeletal: Positive for arthralgias, back pain, myalgias and neck pain  Skin: Negative for rash  Neurological: Negative for dizziness, weakness, light-headedness and headaches  Hematological: Does not bruise/bleed easily  Psychiatric/Behavioral: Negative for dysphoric mood and sleep disturbance  Objective:      Vitals:    04/14/22 1504   BP: 114/82   Pulse: 84   Resp: 16          Physical Exam  Constitutional:       Appearance: She is well-developed  HENT:      Head: Normocephalic and atraumatic  Nose: Nose normal    Eyes:      General: No scleral icterus  Conjunctiva/sclera: Conjunctivae normal       Pupils: Pupils are equal, round, and reactive to light  Neck:      Thyroid: No thyromegaly  Vascular: No JVD  Trachea: No tracheal deviation  Cardiovascular:      Rate and Rhythm: Normal rate and regular rhythm  Heart sounds: No murmur heard  No friction rub  No gallop  Pulmonary:      Effort: Pulmonary effort is normal  No respiratory distress  Breath sounds: Normal breath sounds  No wheezing or rales  Musculoskeletal:         General: No deformity  Cervical back: Normal range of motion and neck supple  Comments: Bilateral shoulders with full range of motion, neck range of motion limited to left rotation proximally 45°, right rotation 45-60 degrees  Limited forward flexion and extension of the neck, unable to touch chin to chest     There is no cervical spinous process tenderness or palpable bony deformity    There is midthoracic spinous process tenderness without palpable bony deformity or crepitus  There is no lumbar spinous process tenderness    There is right sacroiliac tenderness  Ribs are normal on inspection and palpation    There is no soft tissue bruising or erythema noted   Lymphadenopathy:      Cervical: No cervical adenopathy  Skin:     General: Skin is warm and dry  Coloration: Skin is not pale        Findings: No erythema or rash  Neurological:      Mental Status: She is alert and oriented to person, place, and time  Cranial Nerves: No cranial nerve deficit  Psychiatric:         Behavior: Behavior normal          Thought Content:  Thought content normal          Judgment: Judgment normal

## 2022-04-14 NOTE — PATIENT INSTRUCTIONS
I explained my expectation is that these are soft tissue injuries that should improve gradually over the next several days to weeks  I expect the coccyx pain to likely take more than 2 weeks to completely resolve  I explained that none of these injuries appear to warrant x-rays now because even if there is occult nondisplaced fracture it would not change our management at this time  Patient is encouraged to contact me if symptoms worsen or if they fail to improve as outlined which would prompt further workup  Continue with over-the-counter analgesics, heating pads, consider topical anti-inflammatory such as Voltaren

## 2022-04-25 DIAGNOSIS — J45.909 UNCOMPLICATED ASTHMA, UNSPECIFIED ASTHMA SEVERITY, UNSPECIFIED WHETHER PERSISTENT: ICD-10-CM

## 2022-04-25 RX ORDER — IPRATROPIUM BROMIDE AND ALBUTEROL SULFATE 2.5; .5 MG/3ML; MG/3ML
3 SOLUTION RESPIRATORY (INHALATION) EVERY 4 HOURS PRN
Qty: 75 ML | Refills: 1 | Status: SHIPPED | OUTPATIENT
Start: 2022-04-25

## 2022-04-27 ENCOUNTER — TELEMEDICINE (OUTPATIENT)
Dept: INTERNAL MEDICINE CLINIC | Facility: CLINIC | Age: 52
End: 2022-04-27
Payer: COMMERCIAL

## 2022-04-27 DIAGNOSIS — U07.1 COVID-19: Primary | ICD-10-CM

## 2022-04-27 PROCEDURE — 99442 PR PHYS/QHP TELEPHONE EVALUATION 11-20 MIN: CPT | Performed by: INTERNAL MEDICINE

## 2022-04-27 RX ORDER — BUDESONIDE 180 UG/1
4 AEROSOL, POWDER RESPIRATORY (INHALATION) 2 TIMES DAILY
Qty: 1 EACH | Refills: 0 | Status: SHIPPED | OUTPATIENT
Start: 2022-04-27 | End: 2022-05-11

## 2022-04-27 NOTE — PROGRESS NOTES
COVID-19 Outpatient Progress Note    Assessment/Plan:    Problem List Items Addressed This Visit     None         Disposition:     Discussed vitamin D, vitamin C, and/or zinc supplementation with patient  I have spent 12 minutes directly with the patient  Greater than 50% of this time was spent in counseling/coordination of care regarding: prognosis, risks and benefits of treatment options and impressions  Isolate 5 days, mask for an additional 5 days, continue nebulizer as needed, add budesonide     Encounter provider Ancelmo Andrea MD    Provider located at [address] [place] [address]    Recent Visits  No visits were found meeting these conditions  Showing recent visits within past 7 days and meeting all other requirements  Future Appointments  No visits were found meeting these conditions  Showing future appointments within next 150 days and meeting all other requirements       Patient agrees to participate in a virtual check in via telephone or video visit instead of presenting to the office to address urgent/immediate medical needs  Patient is aware this is a billable service  After connecting through [facility], the patient was identified by name and date of birth  Glen Rodrigez was informed that this was a telemedicine visit and that the exam was being conducted confidentially over secure lines  My office door was closed  No one else was in the room  Glen Rodrigez acknowledged consent and understanding of privacy and security of the telemedicine visit  I informed the patient that I have reviewed her record in Epic and presented the opportunity for her to ask any questions regarding the visit today  The patient agreed to participate      Verification of patient location:  Patient is located in the following state in which I hold an active license: [place]    Subjective:   Glen Rodrigez is a 46 y o  female who is concerned about COVID-19  Patient's symptoms include fever, chills, fatigue, malaise, nasal congestion, sore throat, cough, chest tightness, myalgias and headache  Patient denies rhinorrhea, anosmia, loss of taste, shortness of breath, abdominal pain, nausea, vomiting and diarrhea  - Date of symptom onset: 4/24/2022  - Date of exposure: 4/21/2022      COVID-19 vaccination status: Fully vaccinated with booster    Exposure:   Contact with a person who is under investigation (PUI) for or who is positive for COVID-19 within the last 14 days?: Yes    Hospitalized recently for fever and/or lower respiratory symptoms?: No      Currently a healthcare worker that is involved in direct patient care?: No      Works in a special setting where the risk of COVID-19 transmission may be high? (this may include long-term care, correctional and [facility] facilities; homeless shelters; assisted-living facilities and group homes ): No      Resident in a special setting where the risk of COVID-19 transmission may be high? (this may include long-term care, correctional and [facility] facilities; homeless shelters; assisted-living facilities and group homes ): No      Lab Results   Component Value Date    SARSCOV2 Negative 11/12/2021    [facility] 98 Not Detected 07/23/2020     Past Medical History:   Diagnosis Date    Asthma     Hypoglycemia     Idiopathic angioedema     Seizures ([address] )     as a baby     Past Surgical History:   Procedure Laterality Date    AK COLONOSCOPY FLX DX W/COLLJ SPEC WHEN PFRMD N/A 1/10/2019    Procedure: COLONOSCOPY;  Surgeon: Boo Lorenzo MD;  Location: [place] [facility];   Service: Gastroenterology    WRIST SURGERY      mass excision     Current Outpatient Medications   Medication Sig Dispense Refill    albuterol (VENTOLIN HFA) 90 mcg/act inhaler Inhale 2 puffs every 4 (four) hours as needed for wheezing 1 Inhaler 1    Cholecalciferol (VITAMIN D3) 2000 units capsule Take by mouth daily      escitalopram (LEXAPRO) 5 mg tablet Take 1/2 tablet at bedtime (Patient not taking: Reported on 4/14/2022 ) 30 tablet 1    fexofenadine (ALLEGRA ALLERGY) 60 MG tablet Take 60 mg by mouth as needed        fluticasone (FLONASE) 50 mcg/act nasal spray 2 sprays into each nostril as needed        ipratropium-albuterol (DUO-NEB) 0 5-2 5 mg/3 mL nebulizer solution Take 3 mL by nebulization every 4 (four) hours as needed for shortness of breath 75 mL 1    propranolol (INDERAL) 10 mg tablet Take 1 tablet by mouth 2 (two) times a day as needed      Turmeric 500 MG CAPS Take by mouth 2 (two) times a day       No current facility-administered medications for this visit  Allergies   Allergen Reactions    Penicillins Hives    Ciprofloxacin Swelling    Dilantin [Phenytoin] Swelling     Other reaction(s): Unknown    Phenobarbital Swelling     Other reaction(s): Unknown    Aspirin Other (See Comments)     Other reaction(s): Unknown  Pt unsure    Cephalexin      Other reaction(s): Unknown    Mephobarbital      Other reaction(s): Unknown  Other reaction(s): Unknown       Review of Systems   Constitutional: Positive for chills, fatigue and fever  HENT: Positive for congestion and sore throat  Negative for rhinorrhea  Respiratory: Positive for cough and chest tightness  Negative for shortness of breath  Gastrointestinal: Negative for abdominal pain, diarrhea, nausea and vomiting  Musculoskeletal: Positive for myalgias  Neurological: Positive for headaches  Objective: There were no vitals filed for this visit  Physical Exam  Constitutional:       Appearance: She is well-developed  HENT:      Head: Normocephalic and atraumatic  Pulmonary:      Effort: Pulmonary effort is normal    Neurological:      Mental Status: She is oriented to person, place, and time  Psychiatric:         Behavior: Behavior normal  Behavior is cooperative  Thought Content:  Thought content normal          Judgment: Judgment normal          VIRTUAL VISIT DISCLAIMER    Coradoug Rodgersak verbally agrees to participate in [employer]  Pt is aware that [employer] could be limited without vital signs or the ability to perform a full hands-on physical Adali Saleh understands she or the provider may request at any time to terminate the video visit and request the patient to seek care or treatment in person

## 2022-05-22 DIAGNOSIS — J40 BRONCHITIS: ICD-10-CM

## 2022-05-23 RX ORDER — ALBUTEROL SULFATE 90 UG/1
2 AEROSOL, METERED RESPIRATORY (INHALATION) EVERY 4 HOURS PRN
Qty: 18 G | Refills: 3 | Status: SHIPPED | OUTPATIENT
Start: 2022-05-23

## 2022-10-19 ENCOUNTER — CLINICAL SUPPORT (OUTPATIENT)
Dept: INTERNAL MEDICINE CLINIC | Facility: CLINIC | Age: 52
End: 2022-10-19
Payer: COMMERCIAL

## 2022-10-19 DIAGNOSIS — Z23 ENCOUNTER FOR VACCINATION: Primary | ICD-10-CM

## 2022-10-19 PROCEDURE — 90682 RIV4 VACC RECOMBINANT DNA IM: CPT

## 2022-10-19 PROCEDURE — 90471 IMMUNIZATION ADMIN: CPT

## 2022-11-08 ENCOUNTER — APPOINTMENT (OUTPATIENT)
Dept: LAB | Facility: HOSPITAL | Age: 52
End: 2022-11-08

## 2022-11-08 DIAGNOSIS — Z13.6 SCREENING FOR CARDIOVASCULAR CONDITION: ICD-10-CM

## 2022-11-08 DIAGNOSIS — E55.9 MILD VITAMIN D DEFICIENCY: ICD-10-CM

## 2022-11-08 DIAGNOSIS — R73.01 IFG (IMPAIRED FASTING GLUCOSE): ICD-10-CM

## 2022-11-08 DIAGNOSIS — I10 ESSENTIAL HYPERTENSION: ICD-10-CM

## 2022-11-08 DIAGNOSIS — E03.8 SUBCLINICAL HYPOTHYROIDISM: ICD-10-CM

## 2022-11-08 LAB
25(OH)D3 SERPL-MCNC: 20.8 NG/ML (ref 30–100)
ALBUMIN SERPL BCP-MCNC: 3.3 G/DL (ref 3.5–5)
ALP SERPL-CCNC: 78 U/L (ref 46–116)
ALT SERPL W P-5'-P-CCNC: 33 U/L (ref 12–78)
ANION GAP SERPL CALCULATED.3IONS-SCNC: 5 MMOL/L (ref 4–13)
AST SERPL W P-5'-P-CCNC: 17 U/L (ref 5–45)
BASOPHILS # BLD AUTO: 0.06 THOUSANDS/ÂΜL (ref 0–0.1)
BASOPHILS NFR BLD AUTO: 1 % (ref 0–1)
BILIRUB SERPL-MCNC: 0.41 MG/DL (ref 0.2–1)
BUN SERPL-MCNC: 13 MG/DL (ref 5–25)
CALCIUM ALBUM COR SERPL-MCNC: 9.6 MG/DL (ref 8.3–10.1)
CALCIUM SERPL-MCNC: 9 MG/DL (ref 8.3–10.1)
CHLORIDE SERPL-SCNC: 108 MMOL/L (ref 96–108)
CHOLEST SERPL-MCNC: 203 MG/DL
CO2 SERPL-SCNC: 27 MMOL/L (ref 21–32)
CREAT SERPL-MCNC: 0.89 MG/DL (ref 0.6–1.3)
EOSINOPHIL # BLD AUTO: 0.22 THOUSAND/ÂΜL (ref 0–0.61)
EOSINOPHIL NFR BLD AUTO: 4 % (ref 0–6)
ERYTHROCYTE [DISTWIDTH] IN BLOOD BY AUTOMATED COUNT: 13.7 % (ref 11.6–15.1)
EST. AVERAGE GLUCOSE BLD GHB EST-MCNC: 114 MG/DL
GFR SERPL CREATININE-BSD FRML MDRD: 74 ML/MIN/1.73SQ M
GLUCOSE P FAST SERPL-MCNC: 106 MG/DL (ref 65–99)
HBA1C MFR BLD: 5.6 %
HCT VFR BLD AUTO: 38.4 % (ref 34.8–46.1)
HDLC SERPL-MCNC: 77 MG/DL
HGB BLD-MCNC: 12.6 G/DL (ref 11.5–15.4)
IMM GRANULOCYTES # BLD AUTO: 0.01 THOUSAND/UL (ref 0–0.2)
IMM GRANULOCYTES NFR BLD AUTO: 0 % (ref 0–2)
LDLC SERPL CALC-MCNC: 110 MG/DL (ref 0–100)
LYMPHOCYTES # BLD AUTO: 2.05 THOUSANDS/ÂΜL (ref 0.6–4.47)
LYMPHOCYTES NFR BLD AUTO: 35 % (ref 14–44)
MCH RBC QN AUTO: 29.6 PG (ref 26.8–34.3)
MCHC RBC AUTO-ENTMCNC: 32.8 G/DL (ref 31.4–37.4)
MCV RBC AUTO: 90 FL (ref 82–98)
MONOCYTES # BLD AUTO: 0.51 THOUSAND/ÂΜL (ref 0.17–1.22)
MONOCYTES NFR BLD AUTO: 9 % (ref 4–12)
NEUTROPHILS # BLD AUTO: 3.03 THOUSANDS/ÂΜL (ref 1.85–7.62)
NEUTS SEG NFR BLD AUTO: 51 % (ref 43–75)
NONHDLC SERPL-MCNC: 126 MG/DL
NRBC BLD AUTO-RTO: 0 /100 WBCS
PLATELET # BLD AUTO: 299 THOUSANDS/UL (ref 149–390)
PMV BLD AUTO: 10 FL (ref 8.9–12.7)
POTASSIUM SERPL-SCNC: 4.1 MMOL/L (ref 3.5–5.3)
PROT SERPL-MCNC: 7.5 G/DL (ref 6.4–8.4)
RBC # BLD AUTO: 4.25 MILLION/UL (ref 3.81–5.12)
SODIUM SERPL-SCNC: 140 MMOL/L (ref 135–147)
TRIGL SERPL-MCNC: 79 MG/DL
TSH SERPL DL<=0.05 MIU/L-ACNC: 3.92 UIU/ML (ref 0.45–4.5)
WBC # BLD AUTO: 5.88 THOUSAND/UL (ref 4.31–10.16)

## 2022-11-14 ENCOUNTER — TELEPHONE (OUTPATIENT)
Dept: ADMINISTRATIVE | Facility: OTHER | Age: 52
End: 2022-11-14

## 2022-11-14 NOTE — TELEPHONE ENCOUNTER
----- Message from Duran Ma sent at 11/11/2022  2:00 PM EST -----  Regarding: mammo  11/11/22 2:00 PM    Hello, our patient Rush You has had Mammogram completed/performed  Please assist in updating the patient chart by pulling the Care Everywhere (CE) document  The date of service is 2022       Thank you,  Duran Bhatt 62

## 2022-11-14 NOTE — TELEPHONE ENCOUNTER
----- Message from Mckinley Stevens sent at 11/11/2022  2:01 PM EST -----  Regarding: pap smear  11/11/22 2:01 PM    Hello, our patient Jessica Ballard has had Pap Smear (HPV) aka Cervical Cancer Screening completed/performed  Please assist in updating the patient chart by pulling the Care Everywhere (CE) document  The date of service is 2021       Thank you,  Mckinley Bhatt 62

## 2022-11-21 ENCOUNTER — OFFICE VISIT (OUTPATIENT)
Dept: INTERNAL MEDICINE CLINIC | Facility: CLINIC | Age: 52
End: 2022-11-21

## 2022-11-21 ENCOUNTER — TELEPHONE (OUTPATIENT)
Dept: ADMINISTRATIVE | Facility: OTHER | Age: 52
End: 2022-11-21

## 2022-11-21 VITALS
RESPIRATION RATE: 18 BRPM | SYSTOLIC BLOOD PRESSURE: 132 MMHG | BODY MASS INDEX: 29.32 KG/M2 | HEART RATE: 84 BPM | HEIGHT: 65 IN | WEIGHT: 176 LBS | DIASTOLIC BLOOD PRESSURE: 88 MMHG

## 2022-11-21 DIAGNOSIS — Z12.31 BREAST CANCER SCREENING BY MAMMOGRAM: ICD-10-CM

## 2022-11-21 DIAGNOSIS — R23.8 OTHER SKIN CHANGES: ICD-10-CM

## 2022-11-21 DIAGNOSIS — J45.20 MILD INTERMITTENT ASTHMA, UNSPECIFIED WHETHER COMPLICATED: ICD-10-CM

## 2022-11-21 DIAGNOSIS — F41.9 ANXIETY: ICD-10-CM

## 2022-11-21 DIAGNOSIS — E03.8 SUBCLINICAL HYPOTHYROIDISM: Primary | ICD-10-CM

## 2022-11-21 DIAGNOSIS — R73.01 IFG (IMPAIRED FASTING GLUCOSE): ICD-10-CM

## 2022-11-21 DIAGNOSIS — E55.9 VITAMIN D DEFICIENCY: ICD-10-CM

## 2022-11-21 DIAGNOSIS — I10 ESSENTIAL HYPERTENSION: ICD-10-CM

## 2022-11-21 DIAGNOSIS — Z00.00 PHYSICAL EXAM: ICD-10-CM

## 2022-11-21 NOTE — TELEPHONE ENCOUNTER
----- Message from Maryann Bennett sent at 11/18/2022  2:36 PM EST -----  Regarding: pap/mammo  11/18/22 2:37 PM    Hello, our patient Chayo Bowers has had Mammogram and Pap Smear (HPV) aka Cervical Cancer Screening completed/performed  Please assist in updating the patient chart by pulling the Care Everywhere (CE) document  The date of service is 21/22       Thank you,  Maryann Bhatt 62

## 2022-11-21 NOTE — PROGRESS NOTES
INTERNAL MEDICINE FOLLOW-UP VISIT  [facility] - [facility] [address] [place] [address]    NAME: Luis Miguel Avery  AGE: 45 y o  SEX: female  : 1970     DATE: 2022     Assessment and Plan:   1  Physical exam   declines vaccines, otherwise up-to-date    2  Subclinical hypothyroidism  Now normalized  - Lipid panel; Future  - TSH, 3rd generation with Free T4 reflex; Future    3  Mild intermittent asthma, unspecified whether complicated   has not felt the same since COVID intermittent shortness of breath with exertion but she has also gained weight  - Complete PFT with post bronchodilator; Future    5  Breast cancer screening by mammogram      6  IFG (impaired fasting glucose)  - Hemoglobin A1C; Future    7  Anxiety    She was prescribed Lexapro she admits that she never took it, she is just anxious to try new medication  We discussed that we could go very very slow and titrate up as tolerated  She will consider    8  Vitamin D deficiency   recommend 2000 units daily  - Vitamin D 25 hydroxy; Future    9  Other skin changes   general concerns for skin changes refer to Dermatology  - Ambulatory Referral to Dermatology; Future    BMI Counseling: Body mass index is 29 29 kg/m²  The BMI is above normal  Nutrition recommendations include decreasing portion sizes and consuming healthier snacks  Exercise recommendations include exercising 3-5 times per week  No pharmacotherapy was ordered  Rationale for BMI follow-up plan is due to patient being overweight or obese  Depression Screening and Follow-up Plan: Patient was screened for depression during today's encounter  They screened negative with a PHQ-2 score of 0  No follow-ups on file  Chief Complaint:     Chief Complaint   Patient presents with   • Physical Exam      History of Present Illness:      here for annual visit, generally feeling well    She feels like ever since she had COVID she continues to have shortness of breath mostly with going up steps  But she also admits that she has gained weight  She is not exercising on a routine basis  She still has tremendous anxiety  She never started the Lexapro  She is just nervous to take the medication she is always thinking what if  Her breathing has otherwise been stable    The following portions of the patient's history were reviewed and updated as appropriate: allergies, current medications, past family history, past medical history, past social history, past surgical history and problem list      Review of Systems:     Review of Systems   Constitutional: Negative for appetite change, chills, diaphoresis, fatigue, fever and unexpected weight change  HENT: Negative for postnasal drip and sneezing  Eyes: Negative for visual disturbance  Respiratory: Negative for chest tightness and shortness of breath  Cardiovascular: Negative for chest pain, palpitations and leg swelling  Gastrointestinal: Negative for abdominal pain and blood in stool  Endocrine: Negative for cold intolerance, heat intolerance, polydipsia, polyphagia and polyuria  Genitourinary: Negative for difficulty urinating, dysuria, frequency and urgency  Musculoskeletal: Negative for arthralgias and myalgias  Skin: Negative for rash and wound  Neurological: Negative for dizziness, weakness, light-headedness and headaches  Hematological: Negative for adenopathy  Psychiatric/Behavioral: Negative for confusion, dysphoric mood and sleep disturbance  The patient is not nervous/anxious           Past Medical History:     Past Medical History:   Diagnosis Date   • Asthma    • Hypoglycemia    • Idiopathic angioedema    • Seizures ([facility] [address] )     as a baby        Current Medications:     Current Outpatient Medications:   •  albuterol (Ventolin HFA) 90 mcg/act inhaler, Inhale 2 puffs every 4 (four) hours as needed for wheezing, Disp: 18 g, Rfl: 3  •  Cholecalciferol (VITAMIN D3) 2000 units capsule, Take by mouth daily, Disp: , Rfl:   •  fexofenadine (ALLEGRA) 60 MG tablet, Take 60 mg by mouth as needed  , Disp: , Rfl:   •  fluticasone (FLONASE) 50 mcg/act nasal spray, 2 sprays into each nostril as needed  , Disp: , Rfl:   •  ipratropium-albuterol (DUO-NEB) 0 5-2 5 mg/3 mL nebulizer solution, Take 3 mL by nebulization every 4 (four) hours as needed for shortness of breath, Disp: 75 mL, Rfl: 1  •  Turmeric 500 MG CAPS, Take by mouth 2 (two) times a day, Disp: , Rfl:      Allergies: Allergies   Allergen Reactions   • Penicillins Hives   • Ciprofloxacin Swelling   • Dilantin [Phenytoin] Swelling     Other reaction(s): Unknown   • Phenobarbital Swelling     Other reaction(s): Unknown   • Aspirin Other (See Comments)     Other reaction(s): Unknown  Pt unsure   • Azithromycin Other (See Comments)   • Cephalexin      Other reaction(s): Unknown   • Mephobarbital      Other reaction(s): Unknown  Other reaction(s): Unknown        Physical Exam:     /88 (BP Location: Left arm, Patient Position: Sitting)   Pulse 84   Resp 18   Ht 5' 5" (1 651 m)   Wt 79 8 kg (176 lb)   BMI 29 29 kg/m²     Physical Exam  Constitutional:       Appearance: She is well-developed and well-nourished  HENT:      Head: Normocephalic and atraumatic  Eyes:      Pupils: Pupils are equal, round, and reactive to light  Neck:      Thyroid: No thyromegaly  Cardiovascular:      Rate and Rhythm: Normal rate and regular rhythm  Heart sounds: No murmur heard  Pulmonary:      Effort: Pulmonary effort is normal       Breath sounds: Normal breath sounds  Abdominal:      General: Bowel sounds are normal       Palpations: Abdomen is soft  Musculoskeletal:         General: Normal range of motion  Cervical back: Normal range of motion and neck supple  Lymphadenopathy:      Cervical: No cervical adenopathy  Skin:     General: Skin is warm and dry  Neurological:      Mental Status: She is alert and oriented to person, place, and time  Data:     Laboratory Results: I have personally reviewed the pertinent laboratory results/reports   Radiology/Other Diagnostic Testing Results: I have personally reviewed pertinent reports        LEIF Lozoay  [facility]

## 2022-11-22 NOTE — TELEPHONE ENCOUNTER
Upon review of the In Basket request we were able to locate, review, and update the patient chart as requested for Mammogram and Pap Smear (HPV) aka Cervical Cancer Screening  Any additional questions or concerns should be emailed to the Practice Liaisons via the appropriate education email address, please do not reply via In Basket      Thank you  Faith Bateman

## 2022-11-22 NOTE — TELEPHONE ENCOUNTER
Upon review of the In Basket request we have found this is a duplicate request and no further action is needed  This request was completed upon initial request, the patient chart is up to date, and this message will now be closed  Any additional questions or concerns should be emailed to the Practice Liaisons via the appropriate education email address, please do not reply via In Basket      Thank you  Michelle Reid MA

## 2022-12-26 ENCOUNTER — NURSE TRIAGE (OUTPATIENT)
Dept: OTHER | Facility: OTHER | Age: 52
End: 2022-12-26

## 2022-12-26 NOTE — TELEPHONE ENCOUNTER
Regarding: coughing/congestion  ----- Message from Lorie Sera sent at 12/26/2022  7:13 AM EST -----  "I started coughing last Wednesday and it has progressively gotten worse  I am congested in my chest  I had chills and sweats last week but never had a fever   I would like to know what I can do and if I can be seen tomorrow?"

## 2022-12-26 NOTE — TELEPHONE ENCOUNTER
Patient with cold symptoms that started 12/21  Patient c/o cough, body aches, chills  Patient denies fever  Patient is taking mucinex for cough but states it is getting worse and she feels more congested  Patient requesting appointment with pcp  Patient advised to be seen at urgent care today since offices are closed, patient agrees with disposition  No further questions  Reason for Disposition  • [1] Continuous (nonstop) coughing interferes with work or school AND [2] no improvement using cough treatment per Care Advice    Answer Assessment - Initial Assessment Questions  1  ONSET: "When did the symptoms start?"       12/21    2  AMOUNT: "How much discharge is there?"      N/A    3  COUGH: "Do you have a cough?" If yes, ask: "Describe the color of your sputum" (clear, white, yellow, green)      Productive cough    4  RESPIRATORY DISTRESS: "Describe your breathing "       Denies    5  FEVER: "Do you have a fever?" If Yes, ask: "What is your temperature, how was it measured, and when did it start?"      Denies    6  SEVERITY: "Overall, how bad are you feeling right now?" (e g , doesn't interfere with normal activities, staying home from school/work, staying in bed)       Fatigue    7  OTHER SYMPTOMS: "Do you have any other symptoms?" (e g , sore throat, earache, wheezing, vomiting)      Body aches, chills    8   PREGNANCY: "Is there any chance you are pregnant?" "When was your last menstrual period?"      Denies    Taking mucinex for cough    Protocols used: COUGH - ACUTE PRODUCTIVE-ADULT-AH, COMMON COLD-ADULT-AH

## 2023-01-23 ENCOUNTER — OFFICE VISIT (OUTPATIENT)
Dept: INTERNAL MEDICINE CLINIC | Facility: CLINIC | Age: 53
End: 2023-01-23

## 2023-01-23 VITALS
RESPIRATION RATE: 20 BRPM | HEIGHT: 65 IN | TEMPERATURE: 97.3 F | BODY MASS INDEX: 29.32 KG/M2 | OXYGEN SATURATION: 98 % | DIASTOLIC BLOOD PRESSURE: 80 MMHG | SYSTOLIC BLOOD PRESSURE: 136 MMHG | WEIGHT: 176 LBS | HEART RATE: 80 BPM

## 2023-01-23 DIAGNOSIS — M25.571 ACUTE RIGHT ANKLE PAIN: Primary | ICD-10-CM

## 2023-01-23 DIAGNOSIS — H57.11 PAIN OF RIGHT EYE: ICD-10-CM

## 2023-01-23 NOTE — PATIENT INSTRUCTIONS
Eye pain:Right eye pain started 3 days ago  Nontraumatic, no redness, no lacrimation  Rates it a 2 - 3 /10, no radiation  Denies headaches  Patient states she was congested with assocuiated coughing about 3 weeks ago  Tested COVID multiple times were negative  Had chills however no fever  Differential diagnosis considered at this time include postviral syndrome, subacute glaucoma, less likely without cellulitis, preseptal cellulitis, optic neuritis and traumatic injury  Etiology of eye pain remains unclear at this time  Will obtain inflammatory markers -CBC, CMP, CRP, ESR will refer to ophthalmology for stat evaluation  Right ankle pain: Started in November 2022 following a fall  Mechanism of fall was inversion of the foot  There was no immediate swelling  She anticpated that it would have gotten better but did not  Tenderness noted on exam with ROM exercises  Will obtain a Xray of the Rt ankle

## 2023-01-23 NOTE — PROGRESS NOTES
Assessment/Plan:     1  Acute right ankle pain  -     XR foot 3+ vw right; Future; Expected date: 01/23/2023    2  Pain of right eye  -     Sedimentation rate, automated; Future; Expected date: 01/24/2023  -     C-reactive protein; Future; Expected date: 01/24/2023  -     CBC and differential; Future; Expected date: 01/24/2023  -     Comprehensive metabolic panel; Future  -     Ambulatory Referral to Ophthalmology; Future; Expected date: 01/24/2023    Diagnoses and all orders for this visit:    Acute right ankle pain  -     XR foot 3+ vw right; Future    Pain of right eye  -     Sedimentation rate, automated; Future  -     C-reactive protein; Future  -     CBC and differential; Future  -     Comprehensive metabolic panel; Future  -     Ambulatory Referral to Ophthalmology; Future        Subjective:          Patient ID: Daily So is a 45 y o  female  Eye pain:Right eye pain started 3 days ago  Nontraumatic, no redness, no lacrimation  Rates it a 2 - 3 /10, no radiation  Denies headaches  Patient states she was congested with assocuiated coughing about 3 weeks ago  Tested COVID multiple times were negative  Had chills however no fever   Right ankle pain: Started in November 2022 following a fall  Mechanism of fall was inversion of the foot  There was no immediate swelling  She anticpated that it would have gotten better but did not  Tenderness noted on exam with ROM exercises  The following portions of the patient's history were reviewed and updated as appropriate: allergies, current medications, past family history, past medical history, past social history, past surgical history and problem list     Review of Systems   Constitutional: Negative for activity change, chills, fatigue and fever  HENT: Negative for ear pain and sore throat  Eyes: Positive for pain  Negative for photophobia, discharge, redness, itching and visual disturbance     Respiratory: Negative for apnea, cough, chest tightness and shortness of breath  Cardiovascular: Negative for chest pain and palpitations  Gastrointestinal: Negative for abdominal pain and vomiting  Genitourinary: Negative for difficulty urinating, dysuria, flank pain and hematuria  Musculoskeletal: Negative for arthralgias and back pain  Skin: Negative for color change and rash  Neurological: Negative for seizures and syncope  All other systems reviewed and are negative  Objective:      /80 (BP Location: Left arm, Patient Position: Sitting, Cuff Size: Standard)   Pulse 80   Temp (!) 97 3 °F (36 3 °C) (Temporal)   Resp 20   Ht 5' 5" (1 651 m)   Wt 79 8 kg (176 lb)   SpO2 98%   BMI 29 29 kg/m²          Physical Exam  Vitals reviewed  Constitutional:       Appearance: Normal appearance  HENT:      Head: Normocephalic and atraumatic  Mouth/Throat:      Mouth: Mucous membranes are moist    Eyes:      General: Lids are normal  No scleral icterus  Right eye: No foreign body, discharge or hordeolum  Left eye: No foreign body, discharge or hordeolum  Extraocular Movements: Extraocular movements intact  Conjunctiva/sclera: Conjunctivae normal       Right eye: Right conjunctiva is not injected  No chemosis, exudate or hemorrhage  Left eye: Left conjunctiva is not injected  No chemosis, exudate or hemorrhage  Pupils: Pupils are equal, round, and reactive to light  Cardiovascular:      Rate and Rhythm: Normal rate and regular rhythm  Pulses: Normal pulses  Heart sounds: Normal heart sounds  Pulmonary:      Effort: Pulmonary effort is normal  No respiratory distress  Breath sounds: No stridor  Abdominal:      General: Bowel sounds are normal  There is no distension  Palpations: Abdomen is soft  There is no mass  Tenderness: There is no abdominal tenderness  Musculoskeletal:         General: No swelling or tenderness  Normal range of motion        Cervical back: Normal range of motion and neck supple  Skin:     General: Skin is warm  Neurological:      Mental Status: She is alert and oriented to person, place, and time     Psychiatric:         Mood and Affect: Mood normal          Behavior: Behavior normal

## 2023-01-24 ENCOUNTER — TELEPHONE (OUTPATIENT)
Dept: INTERNAL MEDICINE CLINIC | Facility: CLINIC | Age: 53
End: 2023-01-24

## 2023-01-24 ENCOUNTER — APPOINTMENT (OUTPATIENT)
Dept: LAB | Facility: HOSPITAL | Age: 53
End: 2023-01-24

## 2023-01-24 ENCOUNTER — HOSPITAL ENCOUNTER (OUTPATIENT)
Dept: RADIOLOGY | Facility: HOSPITAL | Age: 53
Discharge: HOME | End: 2023-01-24

## 2023-01-24 DIAGNOSIS — M25.571 ACUTE RIGHT ANKLE PAIN: ICD-10-CM

## 2023-01-24 DIAGNOSIS — H57.11 PAIN OF RIGHT EYE: ICD-10-CM

## 2023-01-24 DIAGNOSIS — M25.571 ACUTE RIGHT ANKLE PAIN: Primary | ICD-10-CM

## 2023-01-24 LAB
ALBUMIN SERPL BCP-MCNC: 3.7 G/DL (ref 3.5–5)
ALP SERPL-CCNC: 81 U/L (ref 46–116)
ALT SERPL W P-5'-P-CCNC: 41 U/L (ref 12–78)
ANION GAP SERPL CALCULATED.3IONS-SCNC: 8 MMOL/L (ref 4–13)
AST SERPL W P-5'-P-CCNC: 21 U/L (ref 5–45)
BASOPHILS # BLD AUTO: 0.04 THOUSANDS/ÂΜL (ref 0–0.1)
BASOPHILS NFR BLD AUTO: 1 % (ref 0–1)
BILIRUB SERPL-MCNC: 0.26 MG/DL (ref 0.2–1)
BUN SERPL-MCNC: 22 MG/DL (ref 5–25)
CALCIUM SERPL-MCNC: 9.2 MG/DL (ref 8.3–10.1)
CHLORIDE SERPL-SCNC: 103 MMOL/L (ref 96–108)
CO2 SERPL-SCNC: 29 MMOL/L (ref 21–32)
CREAT SERPL-MCNC: 0.85 MG/DL (ref 0.6–1.3)
CRP SERPL QL: 3.1 MG/L
EOSINOPHIL # BLD AUTO: 0.22 THOUSAND/ÂΜL (ref 0–0.61)
EOSINOPHIL NFR BLD AUTO: 4 % (ref 0–6)
ERYTHROCYTE [DISTWIDTH] IN BLOOD BY AUTOMATED COUNT: 13.6 % (ref 11.6–15.1)
ERYTHROCYTE [SEDIMENTATION RATE] IN BLOOD: 13 MM/HOUR (ref 0–29)
GFR SERPL CREATININE-BSD FRML MDRD: 77 ML/MIN/1.73SQ M
GLUCOSE SERPL-MCNC: 108 MG/DL (ref 65–140)
HCT VFR BLD AUTO: 38.3 % (ref 34.8–46.1)
HGB BLD-MCNC: 12.5 G/DL (ref 11.5–15.4)
IMM GRANULOCYTES # BLD AUTO: 0.01 THOUSAND/UL (ref 0–0.2)
IMM GRANULOCYTES NFR BLD AUTO: 0 % (ref 0–2)
LYMPHOCYTES # BLD AUTO: 2.2 THOUSANDS/ÂΜL (ref 0.6–4.47)
LYMPHOCYTES NFR BLD AUTO: 39 % (ref 14–44)
MCH RBC QN AUTO: 29.4 PG (ref 26.8–34.3)
MCHC RBC AUTO-ENTMCNC: 32.6 G/DL (ref 31.4–37.4)
MCV RBC AUTO: 90 FL (ref 82–98)
MONOCYTES # BLD AUTO: 0.54 THOUSAND/ÂΜL (ref 0.17–1.22)
MONOCYTES NFR BLD AUTO: 10 % (ref 4–12)
NEUTROPHILS # BLD AUTO: 2.63 THOUSANDS/ÂΜL (ref 1.85–7.62)
NEUTS SEG NFR BLD AUTO: 46 % (ref 43–75)
NRBC BLD AUTO-RTO: 0 /100 WBCS
PLATELET # BLD AUTO: 281 THOUSANDS/UL (ref 149–390)
PMV BLD AUTO: 10.4 FL (ref 8.9–12.7)
POTASSIUM SERPL-SCNC: 4.1 MMOL/L (ref 3.5–5.3)
PROT SERPL-MCNC: 7.5 G/DL (ref 6.4–8.4)
RBC # BLD AUTO: 4.25 MILLION/UL (ref 3.81–5.12)
SODIUM SERPL-SCNC: 140 MMOL/L (ref 135–147)
WBC # BLD AUTO: 5.64 THOUSAND/UL (ref 4.31–10.16)

## 2023-01-24 NOTE — TELEPHONE ENCOUNTER
Order was put in for XR foot, patient did not have done because Radiologist says the foot XR will not show the ankle   Change the order for XR of the ankle

## 2023-01-27 ENCOUNTER — HOSPITAL ENCOUNTER (OUTPATIENT)
Dept: RADIOLOGY | Facility: HOSPITAL | Age: 53
End: 2023-01-27

## 2023-01-27 DIAGNOSIS — M25.571 ACUTE RIGHT ANKLE PAIN: ICD-10-CM

## 2023-01-30 DIAGNOSIS — M25.571 ACUTE RIGHT ANKLE PAIN: Primary | ICD-10-CM

## 2023-02-07 ENCOUNTER — TELEPHONE (OUTPATIENT)
Dept: INTERNAL MEDICINE CLINIC | Facility: CLINIC | Age: 53
End: 2023-02-07

## 2023-02-07 NOTE — TELEPHONE ENCOUNTER
Pt getting Tdap next Mon, Feb 13- her daughter is having a baby    Are there any other injections that she should get with her daughter being pregnant?

## 2023-02-13 ENCOUNTER — CLINICAL SUPPORT (OUTPATIENT)
Dept: INTERNAL MEDICINE CLINIC | Facility: CLINIC | Age: 53
End: 2023-02-13

## 2023-02-13 DIAGNOSIS — Z23 ENCOUNTER FOR IMMUNIZATION: Primary | ICD-10-CM

## 2023-05-22 ENCOUNTER — HOSPITAL ENCOUNTER (OUTPATIENT)
Dept: PULMONOLOGY | Facility: HOSPITAL | Age: 53
Discharge: HOME | End: 2023-05-22

## 2023-05-22 DIAGNOSIS — J45.20 MILD INTERMITTENT ASTHMA, UNSPECIFIED WHETHER COMPLICATED: ICD-10-CM

## 2023-05-22 RX ORDER — ALBUTEROL SULFATE 2.5 MG/3ML
2.5 SOLUTION RESPIRATORY (INHALATION) ONCE
Status: COMPLETED | OUTPATIENT
Start: 2023-05-22 | End: 2023-05-22

## 2023-05-22 RX ADMIN — ALBUTEROL SULFATE 2.5 MG: 2.5 SOLUTION RESPIRATORY (INHALATION) at 07:50

## 2023-09-26 ENCOUNTER — TELEPHONE (OUTPATIENT)
Age: 53
End: 2023-09-26

## 2023-09-26 NOTE — TELEPHONE ENCOUNTER
Yes, I think it is reasonable to get as long as you have not had serious reaction to previous vaccines and you have not had COVID in the last 3 months.

## 2023-09-26 NOTE — TELEPHONE ENCOUNTER
Patient wanted to know if you think she should get this year's covid booster, since last year you said she should not get it.

## 2023-09-26 NOTE — TELEPHONE ENCOUNTER
Patient said last vaccine she got she was really sick fever and body aches, is that a normal reaction?

## 2023-10-16 ENCOUNTER — CLINICAL SUPPORT (OUTPATIENT)
Age: 53
End: 2023-10-16
Payer: COMMERCIAL

## 2023-10-16 DIAGNOSIS — Z23 ENCOUNTER FOR IMMUNIZATION: Primary | ICD-10-CM

## 2023-10-16 PROCEDURE — 90471 IMMUNIZATION ADMIN: CPT | Performed by: INTERNAL MEDICINE

## 2023-10-16 PROCEDURE — 90686 IIV4 VACC NO PRSV 0.5 ML IM: CPT | Performed by: INTERNAL MEDICINE

## 2023-11-20 ENCOUNTER — TELEPHONE (OUTPATIENT)
Age: 53
End: 2023-11-20

## 2023-11-20 NOTE — TELEPHONE ENCOUNTER
Patient wanted to know if she could still get her labs completed because the  tomorrow. Informed the patient that tomorrow is the last day she can get them completed.  Patient stated that she will be going tomorrow morning to get them completed

## 2023-11-21 ENCOUNTER — APPOINTMENT (OUTPATIENT)
Dept: LAB | Facility: HOSPITAL | Age: 53
End: 2023-11-21
Payer: COMMERCIAL

## 2023-11-21 DIAGNOSIS — I10 ESSENTIAL HYPERTENSION: ICD-10-CM

## 2023-11-21 DIAGNOSIS — E03.8 SUBCLINICAL HYPOTHYROIDISM: ICD-10-CM

## 2023-11-21 DIAGNOSIS — E55.9 VITAMIN D DEFICIENCY: ICD-10-CM

## 2023-11-21 DIAGNOSIS — R73.01 IFG (IMPAIRED FASTING GLUCOSE): ICD-10-CM

## 2023-11-21 LAB
25(OH)D3 SERPL-MCNC: 20.3 NG/ML (ref 30–100)
ALBUMIN SERPL BCP-MCNC: 4.3 G/DL (ref 3.5–5)
ALP SERPL-CCNC: 75 U/L (ref 34–104)
ALT SERPL W P-5'-P-CCNC: 23 U/L (ref 7–52)
ANION GAP SERPL CALCULATED.3IONS-SCNC: 5 MMOL/L
AST SERPL W P-5'-P-CCNC: 16 U/L (ref 13–39)
BASOPHILS # BLD AUTO: 0.03 THOUSANDS/ÂΜL (ref 0–0.1)
BASOPHILS NFR BLD AUTO: 1 % (ref 0–1)
BILIRUB SERPL-MCNC: 0.4 MG/DL (ref 0.2–1)
BUN SERPL-MCNC: 17 MG/DL (ref 5–25)
CALCIUM SERPL-MCNC: 9.4 MG/DL (ref 8.4–10.2)
CHLORIDE SERPL-SCNC: 105 MMOL/L (ref 96–108)
CHOLEST SERPL-MCNC: 186 MG/DL
CO2 SERPL-SCNC: 28 MMOL/L (ref 21–32)
CREAT SERPL-MCNC: 0.87 MG/DL (ref 0.6–1.3)
EOSINOPHIL # BLD AUTO: 0.13 THOUSAND/ÂΜL (ref 0–0.61)
EOSINOPHIL NFR BLD AUTO: 2 % (ref 0–6)
ERYTHROCYTE [DISTWIDTH] IN BLOOD BY AUTOMATED COUNT: 13.9 % (ref 11.6–15.1)
EST. AVERAGE GLUCOSE BLD GHB EST-MCNC: 120 MG/DL
GFR SERPL CREATININE-BSD FRML MDRD: 75 ML/MIN/1.73SQ M
GLUCOSE P FAST SERPL-MCNC: 108 MG/DL (ref 65–99)
HBA1C MFR BLD: 5.8 %
HCT VFR BLD AUTO: 38 % (ref 34.8–46.1)
HDLC SERPL-MCNC: 70 MG/DL
HGB BLD-MCNC: 12.2 G/DL (ref 11.5–15.4)
IMM GRANULOCYTES # BLD AUTO: 0.01 THOUSAND/UL (ref 0–0.2)
IMM GRANULOCYTES NFR BLD AUTO: 0 % (ref 0–2)
LDLC SERPL CALC-MCNC: 100 MG/DL (ref 0–100)
LYMPHOCYTES # BLD AUTO: 2.1 THOUSANDS/ÂΜL (ref 0.6–4.47)
LYMPHOCYTES NFR BLD AUTO: 39 % (ref 14–44)
MCH RBC QN AUTO: 28.8 PG (ref 26.8–34.3)
MCHC RBC AUTO-ENTMCNC: 32.1 G/DL (ref 31.4–37.4)
MCV RBC AUTO: 90 FL (ref 82–98)
MONOCYTES # BLD AUTO: 0.43 THOUSAND/ÂΜL (ref 0.17–1.22)
MONOCYTES NFR BLD AUTO: 8 % (ref 4–12)
NEUTROPHILS # BLD AUTO: 2.76 THOUSANDS/ÂΜL (ref 1.85–7.62)
NEUTS SEG NFR BLD AUTO: 50 % (ref 43–75)
NONHDLC SERPL-MCNC: 116 MG/DL
NRBC BLD AUTO-RTO: 0 /100 WBCS
PLATELET # BLD AUTO: 292 THOUSANDS/UL (ref 149–390)
PMV BLD AUTO: 10.4 FL (ref 8.9–12.7)
POTASSIUM SERPL-SCNC: 4.2 MMOL/L (ref 3.5–5.3)
PROT SERPL-MCNC: 7.1 G/DL (ref 6.4–8.4)
RBC # BLD AUTO: 4.23 MILLION/UL (ref 3.81–5.12)
SODIUM SERPL-SCNC: 138 MMOL/L (ref 135–147)
T4 FREE SERPL-MCNC: 0.61 NG/DL (ref 0.61–1.12)
TRIGL SERPL-MCNC: 80 MG/DL
TSH SERPL DL<=0.05 MIU/L-ACNC: 5.64 UIU/ML (ref 0.45–4.5)
WBC # BLD AUTO: 5.46 THOUSAND/UL (ref 4.31–10.16)

## 2023-11-21 PROCEDURE — 82306 VITAMIN D 25 HYDROXY: CPT

## 2023-11-21 PROCEDURE — 84439 ASSAY OF FREE THYROXINE: CPT

## 2023-11-21 PROCEDURE — 80053 COMPREHEN METABOLIC PANEL: CPT

## 2023-11-21 PROCEDURE — 80061 LIPID PANEL: CPT

## 2023-11-21 PROCEDURE — 85025 COMPLETE CBC W/AUTO DIFF WBC: CPT

## 2023-11-21 PROCEDURE — 36415 COLL VENOUS BLD VENIPUNCTURE: CPT

## 2023-11-21 PROCEDURE — 83036 HEMOGLOBIN GLYCOSYLATED A1C: CPT

## 2023-11-21 PROCEDURE — 84443 ASSAY THYROID STIM HORMONE: CPT

## 2023-11-22 ENCOUNTER — TELEPHONE (OUTPATIENT)
Age: 53
End: 2023-11-22

## 2023-11-28 ENCOUNTER — OFFICE VISIT (OUTPATIENT)
Age: 53
End: 2023-11-28
Payer: COMMERCIAL

## 2023-11-28 VITALS
RESPIRATION RATE: 16 BRPM | WEIGHT: 173 LBS | DIASTOLIC BLOOD PRESSURE: 80 MMHG | OXYGEN SATURATION: 98 % | SYSTOLIC BLOOD PRESSURE: 130 MMHG | HEIGHT: 65 IN | HEART RATE: 69 BPM | BODY MASS INDEX: 28.82 KG/M2

## 2023-11-28 DIAGNOSIS — Z13.0 SCREENING FOR IRON DEFICIENCY ANEMIA: ICD-10-CM

## 2023-11-28 DIAGNOSIS — J45.20 MILD INTERMITTENT ASTHMA, UNSPECIFIED WHETHER COMPLICATED: ICD-10-CM

## 2023-11-28 DIAGNOSIS — M79.671 RIGHT FOOT PAIN: ICD-10-CM

## 2023-11-28 DIAGNOSIS — Z12.11 ENCOUNTER FOR SCREENING FOR MALIGNANT NEOPLASM OF COLON: ICD-10-CM

## 2023-11-28 DIAGNOSIS — R73.09 ELEVATED RANDOM BLOOD GLUCOSE LEVEL: ICD-10-CM

## 2023-11-28 DIAGNOSIS — E03.8 SUBCLINICAL HYPOTHYROIDISM: Primary | ICD-10-CM

## 2023-11-28 DIAGNOSIS — Z13.6 ENCOUNTER FOR LIPID SCREENING FOR CARDIOVASCULAR DISEASE: ICD-10-CM

## 2023-11-28 DIAGNOSIS — I10 ESSENTIAL HYPERTENSION: ICD-10-CM

## 2023-11-28 DIAGNOSIS — Z13.220 ENCOUNTER FOR LIPID SCREENING FOR CARDIOVASCULAR DISEASE: ICD-10-CM

## 2023-11-28 DIAGNOSIS — F41.9 ANXIETY: ICD-10-CM

## 2023-11-28 PROCEDURE — 99396 PREV VISIT EST AGE 40-64: CPT

## 2023-11-28 RX ORDER — LEVOTHYROXINE SODIUM 0.03 MG/1
25 TABLET ORAL DAILY
Qty: 30 TABLET | Refills: 3 | Status: SHIPPED | OUTPATIENT
Start: 2023-11-28

## 2023-11-28 NOTE — PROGRESS NOTES
INTERNAL MEDICINE FOLLOW-UP VISIT  [facility] - [facility] INTERNAL MEDICINE [address]    NAME: Elinor Smith  AGE: 52 y.o. SEX: female  : 1970     DATE: 2023     Assessment and Plan:   1. Subclinical hypothyroidism  Patient's TSH is elevated and T4 is borderline low normal at 0.61. She did recently gain 15 to 20 pounds over the last 3 months as well as starting to feel some fatigue. After discussion with patient shared decision making to start her on low-dose levothyroxine 25 mcg has been initiated. Discussed with patient how to take medication first in the morning 1 hour prior to any other medications. Discussed side effects with patient. Will recheck TSH in 6 weeks as well as have a recheck with patient in the office    2. Mild intermittent asthma, unspecified whether complicated  Winter is a trigger or with illness. She will use her inhaler prior to exercise. Very limited need for rescue use at this time    3. Essential hypertension  She does have whitecoat syndrome however blood pressure today is stable. Limit salt in diet to no more than 2000 mg/day. Stay well-hydrated with at least 60 to 70 ounces of water    4. Anxiety  Self controlled no use of medication    5. Right foot pain  Does not hurt when she is walking on it however some certain motions of her foot will cause some pain. Will x-ray her foot. Patient does not want to take medication for this at this time or do physical therapy. Did recommend the possibility of physical therapy if pain persists. 6.  Prediabetes/elevated fasting glucose  Continue to limit the carbs such as bread, rice, pasta in your diet. Physical activity at least 5 days/week    7. Vitamin D deficiency  Levels were low with labs. Patient admits to not taking her vitamin D3 supplement religiously. Start taking 2000 units/day    BMI Counseling: Body mass index is 28.79 kg/m².  The BMI is above normal. Nutrition recommendations include decreasing portion sizes, encouraging healthy choices of fruits and vegetables, consuming healthier snacks, limiting drinks that contain sugar, moderation in carbohydrate intake, reducing intake of saturated and trans fat and reducing intake of cholesterol. Exercise recommendations include exercising 3-5 times per week. No pharmacotherapy was ordered. Rationale for BMI follow-up plan is due to patient being overweight or obese. No follow-ups on file. Chief Complaint:     Chief Complaint   Patient presents with    Physical Exam      History of Present Illness:     August Yao is a 73-year-old female patient here today for her physical.  Overall she is feeling well. She got her labs completed prior to this visit. She recently had a mammogram which was normal she is due in 1 year for same. Colorectal cancer screening was completed approximately 5 years ago where she did have polyps removed she is due again this January. She does admit to recent weight gain over the last couple months as well as some decrease in motivation and fatigue. She also has some limited complaints of "burning" to her upper thigh this only occurred 2 times and was self-limiting and only lasted a few minutes. Her second complaint is pain to her right anterior foot. It is not a constant pain however sometimes when she turns her foot it hurts. She denies any external rotation on foot that could have caused any injury. The following portions of the patient's history were reviewed and updated as appropriate: allergies, current medications, past family history, past medical history, past social history, past surgical history and problem list.     Review of Systems:     Review of Systems   Constitutional:  Negative for appetite change, chills, diaphoresis, fatigue, fever and unexpected weight change. HENT:  Negative for postnasal drip and sneezing. Eyes:  Negative for visual disturbance.    Respiratory:  Negative for chest tightness and shortness of breath. Cardiovascular:  Negative for chest pain, palpitations and leg swelling. Gastrointestinal:  Negative for abdominal pain and blood in stool. Endocrine: Negative for cold intolerance, heat intolerance, polydipsia, polyphagia and polyuria. Genitourinary:  Negative for difficulty urinating, dysuria, frequency and urgency. Musculoskeletal:  Positive for arthralgias. Negative for myalgias. Skin:  Negative for rash and wound. Neurological:  Negative for dizziness, weakness, light-headedness and headaches. Hematological:  Negative for adenopathy. Psychiatric/Behavioral:  Negative for confusion, dysphoric mood and sleep disturbance. The patient is not nervous/anxious. Past Medical History:     Past Medical History:   Diagnosis Date    Allergic     Anxiety     Asthma     Hypoglycemia     Idiopathic angioedema     Seizures (HCC)     as a baby        Current Medications:     Current Outpatient Medications:     albuterol (Ventolin HFA) 90 mcg/act inhaler, Inhale 2 puffs every 4 (four) hours as needed for wheezing, Disp: 18 g, Rfl: 3    Cholecalciferol (VITAMIN D3) 2000 units capsule, Take by mouth daily, Disp: , Rfl:     fexofenadine (ALLEGRA) 60 MG tablet, Take 60 mg by mouth as needed  , Disp: , Rfl:     fluticasone (FLONASE) 50 mcg/act nasal spray, 2 sprays into each nostril as needed  , Disp: , Rfl:     ipratropium-albuterol (DUO-NEB) 0.5-2.5 mg/3 mL nebulizer solution, Take 3 mL by nebulization every 4 (four) hours as needed for shortness of breath, Disp: 75 mL, Rfl: 1    levothyroxine (Synthroid) 25 mcg tablet, Take 1 tablet (25 mcg total) by mouth daily, Disp: 30 tablet, Rfl: 3    Turmeric 500 MG CAPS, Take by mouth 2 (two) times a day, Disp: , Rfl:      Allergies:      Allergies   Allergen Reactions    Penicillins Hives    Ciprofloxacin Swelling    Dilantin [Phenytoin] Swelling     Other reaction(s): Unknown    Phenobarbital Swelling     Other reaction(s): Unknown    Aspirin Other (See Comments)     Other reaction(s): Unknown  Pt unsure    Azithromycin Other (See Comments)    Cephalexin      Other reaction(s): Unknown    Mephobarbital      Other reaction(s): Unknown  Other reaction(s): Unknown        Physical Exam:     /80 (BP Location: Left arm, Patient Position: Sitting, Cuff Size: Large)   Pulse 69   Resp 16   Ht 5' 5" (1.651 m)   Wt 78.5 kg (173 lb)   SpO2 98%   BMI 28.79 kg/m²     Physical Exam  Constitutional:       Appearance: She is well-developed. HENT:      Head: Normocephalic and atraumatic. Eyes:      Pupils: Pupils are equal, round, and reactive to light. Neck:      Thyroid: No thyromegaly. Cardiovascular:      Rate and Rhythm: Normal rate and regular rhythm. Heart sounds: No murmur heard. Pulmonary:      Effort: Pulmonary effort is normal.      Breath sounds: Normal breath sounds. Abdominal:      General: Bowel sounds are normal.      Palpations: Abdomen is soft. Musculoskeletal:         General: Normal range of motion. Cervical back: Normal range of motion and neck supple. Lymphadenopathy:      Cervical: No cervical adenopathy. Skin:     General: Skin is warm and dry. Neurological:      Mental Status: She is alert and oriented to person, place, and time. Data:     Laboratory Results: I have personally reviewed the pertinent laboratory results/reports   Radiology/Other Diagnostic Testing Results: I have personally reviewed pertinent reports.       Brenda Flores, [address] [facility] INTERNAL MEDICINE [address]

## 2023-11-29 ENCOUNTER — TELEPHONE (OUTPATIENT)
Age: 53
End: 2023-11-29

## 2023-11-29 NOTE — TELEPHONE ENCOUNTER
Physical form for job will be faxed over today for Barbi Ku to fill out--was seen 11/28    Requests that we fax to # [phone]

## 2023-12-09 ENCOUNTER — HOSPITAL ENCOUNTER (OUTPATIENT)
Dept: RADIOLOGY | Facility: HOSPITAL | Age: 53
Discharge: HOME | End: 2023-12-09
Payer: COMMERCIAL

## 2023-12-09 DIAGNOSIS — M79.671 RIGHT FOOT PAIN: ICD-10-CM

## 2023-12-09 PROCEDURE — 73630 X-RAY EXAM OF FOOT: CPT

## 2023-12-14 ENCOUNTER — TELEPHONE (OUTPATIENT)
Age: 53
End: 2023-12-14

## 2023-12-14 ENCOUNTER — PREP FOR PROCEDURE (OUTPATIENT)
Age: 53
End: 2023-12-14

## 2023-12-14 DIAGNOSIS — Z86.010 HISTORY OF COLON POLYPS: Primary | ICD-10-CM

## 2023-12-14 NOTE — TELEPHONE ENCOUNTER
Scheduled date of colonoscopy (as of today):1/31/24    Physician performing colonoscopy: Dr. Jorge Simmons    [facility] of colonoscopy: [facility]

## 2023-12-18 ENCOUNTER — TELEPHONE (OUTPATIENT)
Age: 53
End: 2023-12-18

## 2023-12-18 NOTE — TELEPHONE ENCOUNTER
We can do some physical therapy or I can send you to an orthopedic who can consider injections.  Let me know

## 2023-12-18 NOTE — TELEPHONE ENCOUNTER
----- Message from LEIF Perera sent at 12/18/2023  9:41 AM EST -----  Bone spurs noted but overall normal x-ray of right foot.  No fracture no dislocation

## 2023-12-19 DIAGNOSIS — M79.671 RIGHT FOOT PAIN: Primary | ICD-10-CM

## 2024-01-08 ENCOUNTER — TELEPHONE (OUTPATIENT)
Age: 54
End: 2024-01-08

## 2024-01-08 ENCOUNTER — APPOINTMENT (OUTPATIENT)
Dept: LAB | Facility: HOSPITAL | Age: 54
End: 2024-01-08
Payer: COMMERCIAL

## 2024-01-08 DIAGNOSIS — E03.8 SUBCLINICAL HYPOTHYROIDISM: ICD-10-CM

## 2024-01-08 LAB — TSH SERPL DL<=0.05 MIU/L-ACNC: 1.54 UIU/ML (ref 0.45–4.5)

## 2024-01-08 PROCEDURE — 36415 COLL VENOUS BLD VENIPUNCTURE: CPT

## 2024-01-08 PROCEDURE — 84443 ASSAY THYROID STIM HORMONE: CPT

## 2024-01-10 ENCOUNTER — OFFICE VISIT (OUTPATIENT)
Age: 54
End: 2024-01-10
Payer: COMMERCIAL

## 2024-01-10 VITALS
WEIGHT: 171.4 LBS | BODY MASS INDEX: 28.56 KG/M2 | DIASTOLIC BLOOD PRESSURE: 82 MMHG | SYSTOLIC BLOOD PRESSURE: 120 MMHG | HEIGHT: 65 IN | RESPIRATION RATE: 16 BRPM | OXYGEN SATURATION: 97 % | HEART RATE: 75 BPM

## 2024-01-10 DIAGNOSIS — J45.20 MILD INTERMITTENT ASTHMA, UNSPECIFIED WHETHER COMPLICATED: ICD-10-CM

## 2024-01-10 DIAGNOSIS — E03.8 SUBCLINICAL HYPOTHYROIDISM: Primary | ICD-10-CM

## 2024-01-10 DIAGNOSIS — J40 BRONCHITIS: ICD-10-CM

## 2024-01-10 DIAGNOSIS — J45.909 UNCOMPLICATED ASTHMA, UNSPECIFIED ASTHMA SEVERITY, UNSPECIFIED WHETHER PERSISTENT: ICD-10-CM

## 2024-01-10 DIAGNOSIS — R05.1 ACUTE COUGH: ICD-10-CM

## 2024-01-10 LAB
SARS-COV-2 AG UPPER RESP QL IA: NEGATIVE
VALID CONTROL: NORMAL

## 2024-01-10 PROCEDURE — 87811 SARS-COV-2 COVID19 W/OPTIC: CPT

## 2024-01-10 PROCEDURE — 99213 OFFICE O/P EST LOW 20 MIN: CPT

## 2024-01-10 RX ORDER — PREDNISONE 10 MG/1
TABLET ORAL
Qty: 28 TABLET | Refills: 0 | Status: SHIPPED | OUTPATIENT
Start: 2024-01-10

## 2024-01-10 RX ORDER — ALBUTEROL SULFATE 90 UG/1
2 AEROSOL, METERED RESPIRATORY (INHALATION) EVERY 4 HOURS PRN
Qty: 18 G | Refills: 3 | Status: SHIPPED | OUTPATIENT
Start: 2024-01-10

## 2024-01-10 RX ORDER — IPRATROPIUM BROMIDE AND ALBUTEROL SULFATE 2.5; .5 MG/3ML; MG/3ML
3 SOLUTION RESPIRATORY (INHALATION) EVERY 4 HOURS PRN
Qty: 75 ML | Refills: 1 | Status: SHIPPED | OUTPATIENT
Start: 2024-01-10

## 2024-01-10 NOTE — PATIENT INSTRUCTIONS
1. Subclinical hypothyroidism  Started on levothyroxine 25 mcg approximately 6 weeks ago due to TSH of 5.6, T4 of 0.61 (borderline low).  Repeat TSH was completed 6 weeks later showing normalized TSH at 1.5.  Continue on levothyroxine 25 mcg daily.  We will follow-up with you at your 6-month follow-up.  Please obtain labs 1 week prior    2. Asthma  Increase in cough and chest tightness.  Sounds most likely an mild exacerbation of asthma but will swab for COVID due to many people being sick in her office.  Continue using your daily antihistamine.  If symptoms worsen and you are using your rescue inhaler several times a week to several times a day contact the office for change in intervention

## 2024-01-10 NOTE — PROGRESS NOTES
"INTERNAL MEDICINE FOLLOW-UP VISIT  [facility] [address]    NAME: Holly Hicks  AGE: 53 y.o. SEX: female  : 1970     DATE: 1/10/2024     Assessment and Plan:   1. Subclinical hypothyroidism  Started on levothyroxine 25 mcg approximately 6 weeks ago due to TSH of 5.6, T4 of 0.61 (borderline low).  Repeat TSH was completed 6 weeks later showing normalized TSH at 1.5.  Continue on levothyroxine 25 mcg daily.  We will follow-up with you at your 6-month follow-up.  Please obtain labs 1 week prior    2. Asthma  Increase in cough and chest tightness.  Sounds most likely an mild exacerbation of asthma but will swab for COVID due to many people being sick in her office.  Continue using your daily antihistamine.  If symptoms worsen and you are using your rescue inhaler several times a week to several times a day contact the office for change in intervention  COVID negative in the office.   Will provide patient with a course of prednisone that she may start if her symptoms progress.  Discussed side effects and dosing instructions with patient        Return in about 6 months (around 7/10/2024) for 6-month follow-up, check labs 1 week prior.       Chief Complaint:     Chief Complaint   Patient presents with   • Follow-up     6 week        History of Present Illness:     Norman is a 53-year-old female patient with a past medical history significant for subclinical hypothyroidism, anxiety, asthma, hypertension who presents today in the office for 6-week follow-up post treatment for hypothyroidism.    Increase in \"asthma\" symptoms. People around her are coughing at work. She has no other symptoms.     The following portions of the patient's history were reviewed and updated as appropriate: allergies, current medications, past family history, past medical history, past social history, past surgical history and problem list.     Review of Systems:     Review of Systems "   Constitutional:  Negative for activity change, appetite change, chills, diaphoresis, fatigue, fever and unexpected weight change.   HENT:  Negative for postnasal drip and sneezing.    Eyes:  Negative for visual disturbance.   Respiratory:  Positive for cough. Negative for apnea, chest tightness, shortness of breath and wheezing.    Cardiovascular:  Negative for chest pain, palpitations and leg swelling.   Gastrointestinal:  Negative for abdominal distention, abdominal pain, blood in stool, constipation, diarrhea, nausea and vomiting.   Endocrine: Negative for cold intolerance, heat intolerance, polydipsia, polyphagia and polyuria.   Genitourinary:  Negative for difficulty urinating, dysuria, frequency and urgency.   Musculoskeletal:  Negative for arthralgias, back pain, gait problem, joint swelling and myalgias.   Skin:  Negative for rash and wound.   Neurological:  Negative for dizziness, weakness, light-headedness, numbness and headaches.   Hematological:  Negative for adenopathy.   Psychiatric/Behavioral:  Negative for behavioral problems, confusion, dysphoric mood, hallucinations, sleep disturbance and suicidal ideas. The patient is not nervous/anxious.         Past Medical History:     Past Medical History:   Diagnosis Date   • Allergic    • Anxiety    • Asthma    • Hypoglycemia    • Idiopathic angioedema    • Seizures (HCC)     as a baby        Current Medications:     Current Outpatient Medications:   •  albuterol (Ventolin HFA) 90 mcg/act inhaler, Inhale 2 puffs every 4 (four) hours as needed for wheezing, Disp: 18 g, Rfl: 3  •  Cholecalciferol (VITAMIN D3) 2000 units capsule, Take by mouth daily, Disp: , Rfl:   •  fexofenadine (ALLEGRA) 60 MG tablet, Take 60 mg by mouth as needed  , Disp: , Rfl:   •  fluticasone (FLONASE) 50 mcg/act nasal spray, 2 sprays into each nostril as needed  , Disp: , Rfl:   •  ipratropium-albuterol (DUO-NEB) 0.5-2.5 mg/3 mL nebulizer solution, Take 3 mL by nebulization every 4  "(four) hours as needed for shortness of breath, Disp: 75 mL, Rfl: 1  •  levothyroxine (Synthroid) 25 mcg tablet, Take 1 tablet (25 mcg total) by mouth daily, Disp: 30 tablet, Rfl: 3  •  predniSONE 10 mg tablet, Take 4 tablets for 3 days then 3 tablets for 3 days then 2 tablet for 3 days 1 for 1 day, Disp: 28 tablet, Rfl: 0  •  Turmeric 500 MG CAPS, Take by mouth 2 (two) times a day, Disp: , Rfl:      Allergies:     Allergies   Allergen Reactions   • Penicillins Hives   • Ciprofloxacin Swelling   • Dilantin [Phenytoin] Swelling     Other reaction(s): Unknown   • Phenobarbital Swelling     Other reaction(s): Unknown   • Aspirin Other (See Comments)     Other reaction(s): Unknown  Pt unsure   • Azithromycin Other (See Comments)   • Cephalexin      Other reaction(s): Unknown   • Mephobarbital      Other reaction(s): Unknown  Other reaction(s): Unknown        Physical Exam:     /82 (BP Location: Left arm, Patient Position: Sitting, Cuff Size: Adult)   Pulse 75   Resp 16   Ht 5' 5\" (1.651 m)   Wt 77.7 kg (171 lb 6.4 oz)   SpO2 97%   BMI 28.52 kg/m²     Physical Exam  Constitutional:       Appearance: She is well-developed.   HENT:      Head: Normocephalic and atraumatic.   Eyes:      Pupils: Pupils are equal, round, and reactive to light.   Neck:      Thyroid: No thyromegaly.   Cardiovascular:      Rate and Rhythm: Normal rate and regular rhythm.      Heart sounds: No murmur heard.  Pulmonary:      Effort: Pulmonary effort is normal.      Breath sounds: Normal breath sounds.   Abdominal:      General: Bowel sounds are normal.      Palpations: Abdomen is soft.   Musculoskeletal:         General: Normal range of motion.      Cervical back: Normal range of motion and neck supple.   Lymphadenopathy:      Cervical: No cervical adenopathy.   Skin:     General: Skin is warm and dry.   Neurological:      Mental Status: She is alert and oriented to person, place, and time.           Data:     Laboratory Results: I have " personally reviewed the pertinent laboratory results/reports   Radiology/Other Diagnostic Testing Results: I have personally reviewed pertinent reports.      LEIF Perera  [facility] [address]

## 2024-01-30 ENCOUNTER — NURSE TRIAGE (OUTPATIENT)
Age: 54
End: 2024-01-30

## 2024-01-30 ENCOUNTER — TELEPHONE (OUTPATIENT)
Age: 54
End: 2024-01-30

## 2024-01-30 NOTE — TELEPHONE ENCOUNTER
Regarding: Colonoscopy and nasal congestion  ----- Message from Lizzette Ratliff sent at 1/30/2024  9:16 AM EST -----  Pt called she is scheduled for a colonoscopy tomorrow. Pt is experiencing nasal congestion, coughing and is hoarse. Pt is asking if she needs to reschedule.

## 2024-01-30 NOTE — TELEPHONE ENCOUNTER
"FYI:  Patient started with cold symptoms last night, no fever, negative Covid. I advised patient monitor her symptoms and call in before she would start her prep to reschedule test if she does not feel well enough to follow through tomorrow. Test is 5 year follow up to previous.      Reason for Disposition   Information only question and nurse able to answer    Answer Assessment - Initial Assessment Questions  1. REASON FOR CALL or QUESTION: \"What is your reason for calling today?\" or \"How can I best help you?\" or \"What question do you have that I can help answer?\"      Patient started with cough, congestion last night. Scheduled for colonoscopy tomorrow, unsure if she should follow through.    Protocols used: Information Only Call - No Triage-ADULT-[place]    "

## 2024-01-30 NOTE — TELEPHONE ENCOUNTER
Pt had several questions regarding Colonoscopy prep. Questioning if she can take Synthroid and if ok to drink beef broth. I advised pt can take am medications with small sips of water up to 2 hrs prior to arrival time and advised pt can drink clear liquids including broth. Pt verbalized understanding.

## 2024-01-30 NOTE — TELEPHONE ENCOUNTER
Pt calling to see if it was ok that she had bone broth and when she can take her meds. Transferred to the nurse

## 2024-01-31 ENCOUNTER — ANESTHESIA (OUTPATIENT)
Dept: GASTROENTEROLOGY | Facility: HOSPITAL | Age: 54
End: 2024-01-31

## 2024-01-31 ENCOUNTER — ANESTHESIA EVENT (OUTPATIENT)
Dept: GASTROENTEROLOGY | Facility: HOSPITAL | Age: 54
End: 2024-01-31

## 2024-01-31 ENCOUNTER — HOSPITAL ENCOUNTER (OUTPATIENT)
Dept: GASTROENTEROLOGY | Facility: HOSPITAL | Age: 54
Setting detail: OUTPATIENT SURGERY
Discharge: HOME | End: 2024-01-31
Attending: INTERNAL MEDICINE | Admitting: INTERNAL MEDICINE
Payer: COMMERCIAL

## 2024-01-31 VITALS
BODY MASS INDEX: 28.61 KG/M2 | WEIGHT: 171.74 LBS | HEIGHT: 65 IN | TEMPERATURE: 97.7 F | DIASTOLIC BLOOD PRESSURE: 71 MMHG | HEART RATE: 65 BPM | SYSTOLIC BLOOD PRESSURE: 153 MMHG | RESPIRATION RATE: 16 BRPM | OXYGEN SATURATION: 100 %

## 2024-01-31 DIAGNOSIS — Z86.010 HISTORY OF COLON POLYPS: ICD-10-CM

## 2024-01-31 PROCEDURE — G0121 COLON CA SCRN NOT HI RSK IND: HCPCS | Performed by: INTERNAL MEDICINE

## 2024-01-31 RX ORDER — SODIUM CHLORIDE, SODIUM LACTATE, POTASSIUM CHLORIDE, CALCIUM CHLORIDE 600; 310; 30; 20 MG/100ML; MG/100ML; MG/100ML; MG/100ML
75 INJECTION, SOLUTION INTRAVENOUS CONTINUOUS
Status: CANCELLED | OUTPATIENT
Start: 2024-01-31

## 2024-01-31 RX ORDER — PROPOFOL 10 MG/ML
INJECTION, EMULSION INTRAVENOUS AS NEEDED
Status: DISCONTINUED | OUTPATIENT
Start: 2024-01-31 | End: 2024-01-31

## 2024-01-31 RX ORDER — LIDOCAINE HYDROCHLORIDE 20 MG/ML
INJECTION, SOLUTION EPIDURAL; INFILTRATION; INTRACAUDAL; PERINEURAL AS NEEDED
Status: DISCONTINUED | OUTPATIENT
Start: 2024-01-31 | End: 2024-01-31

## 2024-01-31 RX ORDER — SODIUM CHLORIDE, SODIUM LACTATE, POTASSIUM CHLORIDE, CALCIUM CHLORIDE 600; 310; 30; 20 MG/100ML; MG/100ML; MG/100ML; MG/100ML
INJECTION, SOLUTION INTRAVENOUS CONTINUOUS PRN
Status: DISCONTINUED | OUTPATIENT
Start: 2024-01-31 | End: 2024-01-31

## 2024-01-31 RX ORDER — SODIUM CHLORIDE, SODIUM LACTATE, POTASSIUM CHLORIDE, CALCIUM CHLORIDE 600; 310; 30; 20 MG/100ML; MG/100ML; MG/100ML; MG/100ML
125 INJECTION, SOLUTION INTRAVENOUS CONTINUOUS
Status: CANCELLED | OUTPATIENT
Start: 2024-01-31

## 2024-01-31 RX ADMIN — PROPOFOL 50 MG: 10 INJECTION, EMULSION INTRAVENOUS at 10:40

## 2024-01-31 RX ADMIN — PROPOFOL 30 MG: 10 INJECTION, EMULSION INTRAVENOUS at 10:34

## 2024-01-31 RX ADMIN — PROPOFOL 120 MG: 10 INJECTION, EMULSION INTRAVENOUS at 10:31

## 2024-01-31 RX ADMIN — LIDOCAINE HYDROCHLORIDE 80 MG: 20 INJECTION, SOLUTION EPIDURAL; INFILTRATION; INTRACAUDAL; PERINEURAL at 10:31

## 2024-01-31 RX ADMIN — SODIUM CHLORIDE, SODIUM LACTATE, POTASSIUM CHLORIDE, AND CALCIUM CHLORIDE: .6; .31; .03; .02 INJECTION, SOLUTION INTRAVENOUS at 09:42

## 2024-01-31 RX ADMIN — PROPOFOL 50 MG: 10 INJECTION, EMULSION INTRAVENOUS at 10:37

## 2024-01-31 NOTE — ANESTHESIA PREPROCEDURE EVALUATION
Procedure:  COLONOSCOPY    Relevant Problems   CARDIO   (+) Essential hypertension      ENDO   (+) Subclinical hypothyroidism      NEURO/PSYCH   (+) Anxiety      PULMONARY   (+) Asthma       Last inhaler use 2 weeks ago    Physical Exam    Airway    Mallampati score: I  TM Distance: >3 FB  Neck ROM: full     Dental   Comment: Denies loose teeth     Cardiovascular  Cardiovascular exam normal    Pulmonary  Pulmonary exam normal     Other Findings  Portions of exam deferred due to low yield and/or unknown COVID statuspost-pubertal.      Anesthesia Plan  ASA Score- 2     Anesthesia Type- IV sedation with anesthesia with ASA Monitors.         Additional Monitors:     Airway Plan:            Plan Factors-Exercise tolerance (METS): >4 METS.    Chart reviewed.   Existing labs reviewed. Patient summary reviewed.    Patient is not a current smoker.              Induction- intravenous.    Postoperative Plan-     Informed Consent- Anesthetic plan and risks discussed with patient.  I personally reviewed this patient with the CRNA. Discussed and agreed on the Anesthesia Plan with the CRNA..

## 2024-01-31 NOTE — INTERVAL H&P NOTE
H&P reviewed. After examining the patient I find no changes in the patients condition since the H&P had been written.    Vitals:    01/31/24 0934   BP: 146/70   Pulse: 75   Resp: 19   Temp: (!) 97 °F (36.1 °C)   SpO2: 98%

## 2024-01-31 NOTE — ANESTHESIA POSTPROCEDURE EVALUATION
Post-Op Assessment Note    CV Status:  Stable  Pain Score: 0    Pain management: adequate       Mental Status:  Alert and awake   Hydration Status:  Stable   PONV Controlled:  None   Airway Patency:  Patent     Post Op Vitals Reviewed: Yes    No anethesia notable event occurred.    Staff: Anesthesiologist, CRNA               /73 (01/31/24 1048)    Temp 97.7 °F (36.5 °C) (01/31/24 1048)    Pulse 71 (01/31/24 1048)   Resp 17 (01/31/24 1048)    SpO2 100 % (01/31/24 1048)

## 2024-01-31 NOTE — DISCHARGE INSTRUCTIONS
Colonoscopy   WHAT YOU NEED TO KNOW:   A colonoscopy is a procedure to examine the inside of your colon (intestine) with a scope. Polyps or tissue growths may have been removed during your colonoscopy. It is normal to feel bloated and to have some abdominal discomfort. You should be passing gas. If you have hemorrhoids or you had polyps removed, you may have a small amount of bleeding.        DISCHARGE INSTRUCTIONS:   Seek care immediately if:   You have sudden, severe abdominal pain.     You have problems swallowing.     You have a large amount of black, sticky bowel movements or blood in your bowel movements.     You have sudden trouble breathing.     You feel weak, lightheaded, or faint or your heart beats faster than normal for you.     Contact your healthcare provider if:   You have a fever and chills.      You have nausea or are vomiting.      Your abdomen is bloated or feels full and hard.     You have abdominal pain.   You have black, sticky bowel movements or blood in your bowel movements.  You have not had a bowel movement for 3 days after your procedure.  You have rash or hives.  You have questions or concerns about your procedure.    Activity:   Do not lift, strain, or run for 24 hours after your procedure.     Rest after your procedure. You have been given medicine to relax you. Do not drive or make important decisions until the day after your procedure. Return to your normal activity as directed.     Relieve gas and discomfort from bloating by lying on your right side with a heating pad on your abdomen. You may need to take short walks to help the gas move out. Eat small meals until bloating is relieved.  Follow up with your healthcare provider as directed: Write down your questions so you remember to ask them during your visits.     If you take a “blood thinner”, please review the specific instructions from your endoscopist about when you should resume it. These can be found in the “Recommendation”  and “Your Medication list” sections of this After Visit Summary.

## 2024-01-31 NOTE — H&P
History and Physical - [facility]  Holly Hicks 53 y.o. female MRN: [id number]                  HPI: Holly Hicks is a 53 y.o. year old female who presents for colonoscopy for history of colon polyps.  Last colonoscopy 5 years ago      REVIEW OF SYSTEMS: Per the HPI, and otherwise unremarkable.    Historical Information   Past Medical History:   Diagnosis Date    Allergic     Anxiety     Asthma     Hypoglycemia     Idiopathic angioedema     Seizures (HCC)     as a baby     Past Surgical History:   Procedure Laterality Date    CO COLONOSCOPY FLX DX W/COLLJ SPEC WHEN PFRMD N/A 1/10/2019    Procedure: COLONOSCOPY;  Surgeon: Yung English MD;  Location: [facility];  Service: Gastroenterology    WRIST SURGERY      mass excision     Social History   Social History     Substance and Sexual Activity   Alcohol Use Yes    Alcohol/week: 6.0 standard drinks of alcohol    Types: 6 Glasses of wine per week    Comment: socially      Social History     Substance and Sexual Activity   Drug Use No     Social History     Tobacco Use   Smoking Status Former    Current packs/day: 0.00    Average packs/day: 0.3 packs/day for 24.0 years (6.0 ttl pk-yrs)    Types: Cigarettes    Start date: 1980    Quit date: 2004    Years since quittin.4   Smokeless Tobacco Never     Family History   Problem Relation Age of Onset    Hypertension Mother     Other Mother         hypoglycemia     Diabetes type II Mother         controlled w/neuropathy    Hypertension Father     Diabetes type II Father         controlled w/neuropathy    Stroke Father     Cancer Maternal Grandfather        Meds/Allergies     (Not in a hospital admission)      Allergies   Allergen Reactions    Penicillins Hives    Ciprofloxacin Swelling    Dilantin [Phenytoin] Swelling     Other reaction(s): Unknown    Phenobarbital Swelling     Other reaction(s): Unknown    Aspirin Other (See Comments)     Other reaction(s): Unknown  Pt unsure     Azithromycin Other (See Comments)    Cephalexin      Other reaction(s): Unknown    Mephobarbital      Other reaction(s): Unknown  Other reaction(s): Unknown       Objective     not currently breastfeeding.      PHYSICAL EXAM    Gen: NAD  CV: RRR  CHEST: Clear  ABD: soft, NT/ND  EXT: no edema  Neuro: AAO      ASSESSMENT/PLAN:  This is a 53 y.o. year old female here for history of colon polyps    PLAN:   Procedure: Colonoscopy

## 2024-02-21 PROBLEM — Z12.11 SPECIAL SCREENING FOR MALIGNANT NEOPLASMS, COLON: Status: RESOLVED | Noted: 2018-12-27 | Resolved: 2024-02-21

## 2024-02-23 DIAGNOSIS — E03.8 SUBCLINICAL HYPOTHYROIDISM: ICD-10-CM

## 2024-02-23 RX ORDER — LEVOTHYROXINE SODIUM 0.03 MG/1
25 TABLET ORAL DAILY
Qty: 90 TABLET | Refills: 3 | Status: SHIPPED | OUTPATIENT
Start: 2024-02-23

## 2024-03-26 DIAGNOSIS — E03.8 SUBCLINICAL HYPOTHYROIDISM: ICD-10-CM

## 2024-03-26 RX ORDER — LEVOTHYROXINE SODIUM 0.03 MG/1
25 TABLET ORAL DAILY
Qty: 90 TABLET | Refills: 3 | Status: SHIPPED | OUTPATIENT
Start: 2024-03-26

## 2024-03-26 RX ORDER — LEVOTHYROXINE SODIUM 0.03 MG/1
25 TABLET ORAL DAILY
Qty: 90 TABLET | Refills: 0 | Status: CANCELLED | OUTPATIENT
Start: 2024-03-26

## 2024-03-26 NOTE — TELEPHONE ENCOUNTER
Patient says that she is on the medication now and she feels great and has had no side effects. Patient requesting 90 day refill to be sent to [facility], please advise

## 2024-08-06 ENCOUNTER — TELEPHONE (OUTPATIENT)
Age: 54
End: 2024-08-06

## 2024-08-06 NOTE — TELEPHONE ENCOUNTER
Patient had to reschedule her 6-month followup from 8/12/24 to 12/31/24 due to Dr. Lyles having no openings prior to that.  She plans to have her blood work done end of this week and stated the blood work would determine whether she needed to stay on her thyroid medication or if there would be a dosage change.  Please call patient once labs are in and advise whether she should be seen sooner.  Offered earlier appointments with [facility], patient declined.

## 2024-08-09 ENCOUNTER — APPOINTMENT (OUTPATIENT)
Dept: LAB | Facility: HOSPITAL | Age: 54
End: 2024-08-09
Payer: COMMERCIAL

## 2024-08-09 DIAGNOSIS — Z13.0 SCREENING FOR IRON DEFICIENCY ANEMIA: ICD-10-CM

## 2024-08-09 DIAGNOSIS — R73.09 ELEVATED RANDOM BLOOD GLUCOSE LEVEL: ICD-10-CM

## 2024-08-09 DIAGNOSIS — Z13.220 ENCOUNTER FOR LIPID SCREENING FOR CARDIOVASCULAR DISEASE: ICD-10-CM

## 2024-08-09 DIAGNOSIS — Z13.6 ENCOUNTER FOR LIPID SCREENING FOR CARDIOVASCULAR DISEASE: ICD-10-CM

## 2024-08-09 DIAGNOSIS — E03.8 SUBCLINICAL HYPOTHYROIDISM: ICD-10-CM

## 2024-08-09 LAB
ALBUMIN SERPL BCG-MCNC: 4.1 G/DL (ref 3.5–5)
ALP SERPL-CCNC: 76 U/L (ref 34–104)
ALT SERPL W P-5'-P-CCNC: 22 U/L (ref 7–52)
ANION GAP SERPL CALCULATED.3IONS-SCNC: 7 MMOL/L (ref 4–13)
AST SERPL W P-5'-P-CCNC: 17 U/L (ref 13–39)
BASOPHILS # BLD AUTO: 0.03 THOUSANDS/ÂΜL (ref 0–0.1)
BASOPHILS NFR BLD AUTO: 1 % (ref 0–1)
BILIRUB SERPL-MCNC: 0.73 MG/DL (ref 0.2–1)
BUN SERPL-MCNC: 19 MG/DL (ref 5–25)
CALCIUM SERPL-MCNC: 9.2 MG/DL (ref 8.4–10.2)
CHLORIDE SERPL-SCNC: 102 MMOL/L (ref 96–108)
CHOLEST SERPL-MCNC: 193 MG/DL
CO2 SERPL-SCNC: 29 MMOL/L (ref 21–32)
CREAT SERPL-MCNC: 0.84 MG/DL (ref 0.6–1.3)
EOSINOPHIL # BLD AUTO: 0.16 THOUSAND/ÂΜL (ref 0–0.61)
EOSINOPHIL NFR BLD AUTO: 3 % (ref 0–6)
ERYTHROCYTE [DISTWIDTH] IN BLOOD BY AUTOMATED COUNT: 14.1 % (ref 11.6–15.1)
EST. AVERAGE GLUCOSE BLD GHB EST-MCNC: 114 MG/DL
GFR SERPL CREATININE-BSD FRML MDRD: 78 ML/MIN/1.73SQ M
GLUCOSE P FAST SERPL-MCNC: 95 MG/DL (ref 65–99)
HBA1C MFR BLD: 5.6 %
HCT VFR BLD AUTO: 39.1 % (ref 34.8–46.1)
HDLC SERPL-MCNC: 73 MG/DL
HGB BLD-MCNC: 12.5 G/DL (ref 11.5–15.4)
IMM GRANULOCYTES # BLD AUTO: 0.01 THOUSAND/UL (ref 0–0.2)
IMM GRANULOCYTES NFR BLD AUTO: 0 % (ref 0–2)
LDLC SERPL CALC-MCNC: 100 MG/DL (ref 0–100)
LYMPHOCYTES # BLD AUTO: 1.89 THOUSANDS/ÂΜL (ref 0.6–4.47)
LYMPHOCYTES NFR BLD AUTO: 38 % (ref 14–44)
MCH RBC QN AUTO: 28.9 PG (ref 26.8–34.3)
MCHC RBC AUTO-ENTMCNC: 32 G/DL (ref 31.4–37.4)
MCV RBC AUTO: 90 FL (ref 82–98)
MONOCYTES # BLD AUTO: 0.44 THOUSAND/ÂΜL (ref 0.17–1.22)
MONOCYTES NFR BLD AUTO: 9 % (ref 4–12)
NEUTROPHILS # BLD AUTO: 2.47 THOUSANDS/ÂΜL (ref 1.85–7.62)
NEUTS SEG NFR BLD AUTO: 49 % (ref 43–75)
NONHDLC SERPL-MCNC: 120 MG/DL
NRBC BLD AUTO-RTO: 0 /100 WBCS
PLATELET # BLD AUTO: 278 THOUSANDS/UL (ref 149–390)
PMV BLD AUTO: 10.1 FL (ref 8.9–12.7)
POTASSIUM SERPL-SCNC: 3.9 MMOL/L (ref 3.5–5.3)
PROT SERPL-MCNC: 7 G/DL (ref 6.4–8.4)
RBC # BLD AUTO: 4.33 MILLION/UL (ref 3.81–5.12)
SODIUM SERPL-SCNC: 138 MMOL/L (ref 135–147)
TRIGL SERPL-MCNC: 101 MG/DL
TSH SERPL DL<=0.05 MIU/L-ACNC: 2.87 UIU/ML (ref 0.45–4.5)
WBC # BLD AUTO: 5 THOUSAND/UL (ref 4.31–10.16)

## 2024-08-09 PROCEDURE — 83036 HEMOGLOBIN GLYCOSYLATED A1C: CPT

## 2024-08-09 PROCEDURE — 80053 COMPREHEN METABOLIC PANEL: CPT

## 2024-08-09 PROCEDURE — 80061 LIPID PANEL: CPT

## 2024-08-09 PROCEDURE — 36415 COLL VENOUS BLD VENIPUNCTURE: CPT

## 2024-08-09 PROCEDURE — 84443 ASSAY THYROID STIM HORMONE: CPT

## 2024-08-09 PROCEDURE — 85025 COMPLETE CBC W/AUTO DIFF WBC: CPT

## 2024-08-19 ENCOUNTER — OFFICE VISIT (OUTPATIENT)
Age: 54
End: 2024-08-19
Payer: COMMERCIAL

## 2024-08-19 VITALS
OXYGEN SATURATION: 97 % | HEART RATE: 84 BPM | SYSTOLIC BLOOD PRESSURE: 130 MMHG | BODY MASS INDEX: 28.82 KG/M2 | HEIGHT: 65 IN | WEIGHT: 173 LBS | RESPIRATION RATE: 16 BRPM | DIASTOLIC BLOOD PRESSURE: 86 MMHG

## 2024-08-19 DIAGNOSIS — J45.20 MILD INTERMITTENT ASTHMA, UNSPECIFIED WHETHER COMPLICATED: ICD-10-CM

## 2024-08-19 DIAGNOSIS — Z12.83 SCREENING FOR SKIN CANCER: ICD-10-CM

## 2024-08-19 DIAGNOSIS — F41.9 ANXIETY: ICD-10-CM

## 2024-08-19 DIAGNOSIS — Z12.31 ENCOUNTER FOR SCREENING MAMMOGRAM FOR BREAST CANCER: ICD-10-CM

## 2024-08-19 DIAGNOSIS — R73.03 PREDIABETES: Primary | ICD-10-CM

## 2024-08-19 DIAGNOSIS — I10 ESSENTIAL HYPERTENSION: ICD-10-CM

## 2024-08-19 DIAGNOSIS — E03.9 HYPOTHYROIDISM (ACQUIRED): ICD-10-CM

## 2024-08-19 DIAGNOSIS — E03.8 SUBCLINICAL HYPOTHYROIDISM: ICD-10-CM

## 2024-08-19 PROCEDURE — 99214 OFFICE O/P EST MOD 30 MIN: CPT | Performed by: INTERNAL MEDICINE

## 2024-08-19 RX ORDER — LEVOTHYROXINE SODIUM 25 UG/1
25 TABLET ORAL DAILY
Qty: 90 TABLET | Refills: 3 | Status: SHIPPED | OUTPATIENT
Start: 2024-08-19

## 2024-08-19 NOTE — PROGRESS NOTES
Assessment/Plan:    Diagnoses and all orders for this visit:    Prediabetes  -     CBC and differential; Future  -     Comprehensive metabolic panel; Future  -     Lipid panel; Future  -     Hemoglobin A1C; Future  -     TSH, 3rd generation with Free T4 reflex; Future  -     Ambulatory Referral to Obstetrics / Gynecology; Future    Encounter for screening mammogram for breast cancer  -     Mammo screening bilateral w 3d & cad; Future    Essential hypertension  -     CBC and differential; Future  -     Comprehensive metabolic panel; Future  -     Lipid panel; Future  -     Hemoglobin A1C; Future  -     TSH, 3rd generation with Free T4 reflex; Future  -     Ambulatory Referral to Obstetrics / Gynecology; Future    Mild intermittent asthma, unspecified whether complicated    Hypothyroidism (acquired)  -     TSH, 3rd generation with Free T4 reflex; Future    Anxiety    Screening for skin cancer  -     Ambulatory Referral to Dermatology; Future              Patient Instructions   Lab data reviewed in detail and compared to prior    Prediabetes-A1c improved into the normal range.  Continue low-carb diet, exercise and weight loss efforts    Hypothyroidism stable on levothyroxine    Anxiety has been stable with biofeedback and coping mechanisms.    Asthma and allergies clinically stable    Blood pressure stable without medication    Health maintenance-recommend flu shot this fall, can consider COVID booster as discussed, consider Shingrix as well.  Screening mammogram ordered.    Subjective:      Patient ID: Holly Hicks is a 53 y.o. female    F/u mmp and review labs  Feeling generally well   Exercising sporadically elliptical or walking 30-60 min about 2-4 days per week.    HTN-no recent home bp's   Anxiety-has been ok giving presentations and hasn't used Propranolol   Calcific tendonitis in L shoulder waxes and wanes.  Working w/ Dr. Fox.   Allergy and asthma stable using proair for colds and heavy perfume rxn  etc.   Hypothyroidism-taking rx 4 am, bowels and appetite are stable.   PreDM-watching carbs          Current Outpatient Medications:     albuterol (Ventolin HFA) 90 mcg/act inhaler, Inhale 2 puffs every 4 (four) hours as needed for wheezing, Disp: 18 g, Rfl: 3    Cholecalciferol (VITAMIN D3) 2000 units capsule, Take by mouth daily, Disp: , Rfl:     fexofenadine (ALLEGRA) 60 MG tablet, Take 60 mg by mouth as needed  , Disp: , Rfl:     fluticasone (FLONASE) 50 mcg/act nasal spray, 2 sprays into each nostril as needed  , Disp: , Rfl:     ipratropium-albuterol (DUO-NEB) 0.5-2.5 mg/3 mL nebulizer solution, Take 3 mL by nebulization every 4 (four) hours as needed for shortness of breath, Disp: 75 mL, Rfl: 1    levothyroxine 25 mcg tablet, Take 1 tablet (25 mcg total) by mouth daily, Disp: 90 tablet, Rfl: 3    Turmeric 500 MG CAPS, Take by mouth 2 (two) times a day, Disp: , Rfl:     Recent Results (from the past 1008 hour(s))   CBC and differential    Collection Time: 08/09/24  6:37 AM   Result Value Ref Range    WBC 5.00 4.31 - 10.16 Thousand/uL    RBC 4.33 3.81 - 5.12 Million/uL    Hemoglobin 12.5 11.5 - 15.4 g/dL    Hematocrit 39.1 34.8 - 46.1 %    MCV 90 82 - 98 fL    MCH 28.9 26.8 - 34.3 pg    MCHC 32.0 31.4 - 37.4 g/dL    RDW 14.1 11.6 - 15.1 %    MPV 10.1 8.9 - 12.7 fL    Platelets 278 149 - 390 Thousands/uL    nRBC 0 /100 WBCs    Segmented % 49 43 - 75 %    Immature Grans % 0 0 - 2 %    Lymphocytes % 38 14 - 44 %    Monocytes % 9 4 - 12 %    Eosinophils Relative 3 0 - 6 %    Basophils Relative 1 0 - 1 %    Absolute Neutrophils 2.47 1.85 - 7.62 Thousands/µL    Absolute Immature Grans 0.01 0.00 - 0.20 Thousand/uL    Absolute Lymphocytes 1.89 0.60 - 4.47 Thousands/µL    Absolute Monocytes 0.44 0.17 - 1.22 Thousand/µL    Eosinophils Absolute 0.16 0.00 - 0.61 Thousand/µL    Basophils Absolute 0.03 0.00 - 0.10 Thousands/µL   Comprehensive metabolic panel    Collection Time: 08/09/24  6:37 AM   Result Value Ref Range     Sodium 138 135 - 147 mmol/L    Potassium 3.9 3.5 - 5.3 mmol/L    Chloride 102 96 - 108 mmol/L    CO2 29 21 - 32 mmol/L    ANION GAP 7 4 - 13 mmol/L    BUN 19 5 - 25 mg/dL    Creatinine 0.84 0.60 - 1.30 mg/dL    Glucose, Fasting 95 65 - 99 mg/dL    Calcium 9.2 8.4 - 10.2 mg/dL    AST 17 13 - 39 U/L    ALT 22 7 - 52 U/L    Alkaline Phosphatase 76 34 - 104 U/L    Total Protein 7.0 6.4 - 8.4 g/dL    Albumin 4.1 3.5 - 5.0 g/dL    Total Bilirubin 0.73 0.20 - 1.00 mg/dL    eGFR 78 ml/min/1.73sq m   Lipid panel    Collection Time: 08/09/24  6:37 AM   Result Value Ref Range    Cholesterol 193 See Comment mg/dL    Triglycerides 101 See Comment mg/dL    HDL, Direct 73 >=50 mg/dL    LDL Calculated 100 0 - 100 mg/dL    Non-HDL-Chol (CHOL-HDL) 120 mg/dl   Hemoglobin A1C    Collection Time: 08/09/24  6:37 AM   Result Value Ref Range    Hemoglobin A1C 5.6 Normal 4.0-5.6%; PreDiabetic 5.7-6.4%; Diabetic >=6.5%; Glycemic control for adults with diabetes <7.0% %     mg/dl   TSH, 3rd generation with Free T4 reflex    Collection Time: 08/09/24  6:37 AM   Result Value Ref Range    TSH 3RD GENERATON 2.868 0.450 - 4.500 uIU/mL       The following portions of the patient's history were reviewed and updated as appropriate: allergies, current medications, past family history, past medical history, past social history, past surgical history and problem list.     Review of Systems   Constitutional:  Negative for appetite change, chills, diaphoresis, fatigue, fever and unexpected weight change.   HENT:  Negative for congestion, hearing loss and rhinorrhea.    Eyes:  Negative for visual disturbance.   Respiratory:  Negative for cough, chest tightness, shortness of breath and wheezing.    Cardiovascular:  Negative for chest pain, palpitations and leg swelling.   Gastrointestinal:  Negative for abdominal pain and blood in stool.   Endocrine: Negative for cold intolerance, heat intolerance, polydipsia and polyuria.   Genitourinary:  Negative  for difficulty urinating, dysuria, frequency and urgency.   Musculoskeletal:  Negative for arthralgias and myalgias.   Skin:  Negative for rash.   Neurological:  Negative for dizziness, weakness, light-headedness and headaches.   Hematological:  Does not bruise/bleed easily.   Psychiatric/Behavioral:  Negative for dysphoric mood and sleep disturbance.          Objective:      Vitals:    08/19/24 1648   BP: 130/86   Pulse: 84   Resp: 16   SpO2: 97%          Physical Exam  Constitutional:       Appearance: She is well-developed.   HENT:      Head: Normocephalic and atraumatic.      Nose: Nose normal.   Eyes:      General: No scleral icterus.     Conjunctiva/sclera: Conjunctivae normal.      Pupils: Pupils are equal, round, and reactive to light.   Neck:      Thyroid: No thyromegaly.      Vascular: No JVD.      Trachea: No tracheal deviation.   Cardiovascular:      Rate and Rhythm: Normal rate and regular rhythm.      Heart sounds: No murmur heard.     No friction rub. No gallop.   Pulmonary:      Effort: Pulmonary effort is normal. No respiratory distress.      Breath sounds: Normal breath sounds. No wheezing or rales.   Abdominal:      General: Bowel sounds are normal. There is no distension.      Palpations: Abdomen is soft. There is no mass.      Tenderness: There is no abdominal tenderness. There is no guarding or rebound.   Musculoskeletal:         General: No tenderness.      Cervical back: Normal range of motion and neck supple.   Lymphadenopathy:      Cervical: No cervical adenopathy.   Skin:     General: Skin is warm and dry.      Findings: No erythema or rash.   Neurological:      Mental Status: She is alert and oriented to person, place, and time.      Cranial Nerves: No cranial nerve deficit.   Psychiatric:         Behavior: Behavior normal.         Thought Content: Thought content normal.         Judgment: Judgment normal.

## 2024-08-19 NOTE — PATIENT INSTRUCTIONS
Lab data reviewed in detail and compared to prior    Prediabetes-A1c improved into the normal range.  Continue low-carb diet, exercise and weight loss efforts    Hypothyroidism stable on levothyroxine    Anxiety has been stable with biofeedback and coping mechanisms.    Asthma and allergies clinically stable    Blood pressure stable without medication    Health maintenance-recommend flu shot this fall, can consider COVID booster as discussed, consider Shingrix as well.  Screening mammogram ordered.

## 2024-11-26 ENCOUNTER — APPOINTMENT (OUTPATIENT)
Dept: LAB | Facility: HOSPITAL | Age: 54
End: 2024-11-26
Attending: INTERNAL MEDICINE
Payer: COMMERCIAL

## 2024-11-26 DIAGNOSIS — I10 ESSENTIAL HYPERTENSION: ICD-10-CM

## 2024-11-26 DIAGNOSIS — R73.03 PREDIABETES: ICD-10-CM

## 2024-11-26 DIAGNOSIS — E03.9 HYPOTHYROIDISM (ACQUIRED): ICD-10-CM

## 2024-11-26 LAB
ALBUMIN SERPL BCG-MCNC: 4.1 G/DL (ref 3.5–5)
ALP SERPL-CCNC: 74 U/L (ref 34–104)
ALT SERPL W P-5'-P-CCNC: 23 U/L (ref 7–52)
ANION GAP SERPL CALCULATED.3IONS-SCNC: 6 MMOL/L (ref 4–13)
AST SERPL W P-5'-P-CCNC: 15 U/L (ref 13–39)
BASOPHILS # BLD AUTO: 0.03 THOUSANDS/ΜL (ref 0–0.1)
BASOPHILS NFR BLD AUTO: 1 % (ref 0–1)
BILIRUB SERPL-MCNC: 0.56 MG/DL (ref 0.2–1)
BUN SERPL-MCNC: 18 MG/DL (ref 5–25)
CALCIUM SERPL-MCNC: 9.1 MG/DL (ref 8.4–10.2)
CHLORIDE SERPL-SCNC: 103 MMOL/L (ref 96–108)
CHOLEST SERPL-MCNC: 189 MG/DL (ref ?–200)
CO2 SERPL-SCNC: 30 MMOL/L (ref 21–32)
CREAT SERPL-MCNC: 0.79 MG/DL (ref 0.6–1.3)
EOSINOPHIL # BLD AUTO: 0.17 THOUSAND/ΜL (ref 0–0.61)
EOSINOPHIL NFR BLD AUTO: 4 % (ref 0–6)
ERYTHROCYTE [DISTWIDTH] IN BLOOD BY AUTOMATED COUNT: 13.6 % (ref 11.6–15.1)
EST. AVERAGE GLUCOSE BLD GHB EST-MCNC: 120 MG/DL
GFR SERPL CREATININE-BSD FRML MDRD: 83 ML/MIN/1.73SQ M
GLUCOSE P FAST SERPL-MCNC: 101 MG/DL (ref 65–99)
HBA1C MFR BLD: 5.8 %
HCT VFR BLD AUTO: 40.2 % (ref 34.8–46.1)
HDLC SERPL-MCNC: 71 MG/DL
HGB BLD-MCNC: 12.6 G/DL (ref 11.5–15.4)
IMM GRANULOCYTES # BLD AUTO: 0.01 THOUSAND/UL (ref 0–0.2)
IMM GRANULOCYTES NFR BLD AUTO: 0 % (ref 0–2)
LDLC SERPL CALC-MCNC: 102 MG/DL (ref 0–100)
LYMPHOCYTES # BLD AUTO: 1.93 THOUSANDS/ΜL (ref 0.6–4.47)
LYMPHOCYTES NFR BLD AUTO: 42 % (ref 14–44)
MCH RBC QN AUTO: 28.8 PG (ref 26.8–34.3)
MCHC RBC AUTO-ENTMCNC: 31.3 G/DL (ref 31.4–37.4)
MCV RBC AUTO: 92 FL (ref 82–98)
MONOCYTES # BLD AUTO: 0.41 THOUSAND/ΜL (ref 0.17–1.22)
MONOCYTES NFR BLD AUTO: 9 % (ref 4–12)
NEUTROPHILS # BLD AUTO: 2.02 THOUSANDS/ΜL (ref 1.85–7.62)
NEUTS SEG NFR BLD AUTO: 44 % (ref 43–75)
NONHDLC SERPL-MCNC: 118 MG/DL
NRBC BLD AUTO-RTO: 0 /100 WBCS
PLATELET # BLD AUTO: 303 THOUSANDS/UL (ref 149–390)
PMV BLD AUTO: 10.5 FL (ref 8.9–12.7)
POTASSIUM SERPL-SCNC: 3.9 MMOL/L (ref 3.5–5.3)
PROT SERPL-MCNC: 7.2 G/DL (ref 6.4–8.4)
RBC # BLD AUTO: 4.37 MILLION/UL (ref 3.81–5.12)
SODIUM SERPL-SCNC: 139 MMOL/L (ref 135–147)
TRIGL SERPL-MCNC: 82 MG/DL (ref ?–150)
TSH SERPL DL<=0.05 MIU/L-ACNC: 4.28 UIU/ML (ref 0.45–4.5)
WBC # BLD AUTO: 4.57 THOUSAND/UL (ref 4.31–10.16)

## 2024-11-26 PROCEDURE — 84443 ASSAY THYROID STIM HORMONE: CPT

## 2024-11-26 PROCEDURE — 36415 COLL VENOUS BLD VENIPUNCTURE: CPT

## 2024-11-26 PROCEDURE — 80053 COMPREHEN METABOLIC PANEL: CPT

## 2024-11-26 PROCEDURE — 85025 COMPLETE CBC W/AUTO DIFF WBC: CPT

## 2024-11-26 PROCEDURE — 80061 LIPID PANEL: CPT

## 2024-11-26 PROCEDURE — 83036 HEMOGLOBIN GLYCOSYLATED A1C: CPT

## 2024-12-10 ENCOUNTER — OFFICE VISIT (OUTPATIENT)
Age: 54
End: 2024-12-10
Payer: COMMERCIAL

## 2024-12-10 VITALS
BODY MASS INDEX: 29.99 KG/M2 | HEIGHT: 65 IN | SYSTOLIC BLOOD PRESSURE: 128 MMHG | RESPIRATION RATE: 16 BRPM | OXYGEN SATURATION: 98 % | WEIGHT: 180 LBS | HEART RATE: 74 BPM | DIASTOLIC BLOOD PRESSURE: 66 MMHG

## 2024-12-10 DIAGNOSIS — Z00.00 WELL ADULT EXAM: ICD-10-CM

## 2024-12-10 DIAGNOSIS — R73.03 PREDIABETES: ICD-10-CM

## 2024-12-10 DIAGNOSIS — K21.9 GASTROESOPHAGEAL REFLUX DISEASE WITHOUT ESOPHAGITIS: ICD-10-CM

## 2024-12-10 DIAGNOSIS — I10 WHITE COAT SYNDROME WITH DIAGNOSIS OF HYPERTENSION: Primary | ICD-10-CM

## 2024-12-10 DIAGNOSIS — E03.9 HYPOTHYROIDISM (ACQUIRED): ICD-10-CM

## 2024-12-10 DIAGNOSIS — F41.9 ANXIETY: ICD-10-CM

## 2024-12-10 DIAGNOSIS — J45.20 MILD INTERMITTENT ASTHMA, UNSPECIFIED WHETHER COMPLICATED: ICD-10-CM

## 2024-12-10 PROBLEM — IMO0001 WHITE COAT HYPERTENSION: Status: RESOLVED | Noted: 2020-01-24 | Resolved: 2024-12-10

## 2024-12-10 PROCEDURE — 99396 PREV VISIT EST AGE 40-64: CPT | Performed by: INTERNAL MEDICINE

## 2024-12-10 NOTE — PROGRESS NOTES
Name: Holly Hicks      : 1970      MRN: [id number]  Encounter Provider: Abraham Lyles MD  Encounter Date: 12/10/2024   Encounter department: [facility] [address]  :  Assessment & Plan  White coat syndrome with diagnosis of hypertension  Blood pressure normal today  Orders:    Basic metabolic panel; Future    CBC and differential; Future    Lipid panel; Future    Hepatic function panel; Future    Mild intermittent asthma, unspecified whether complicated  Stable with rare use of albuterol       Hypothyroidism (acquired)  At goal on levothyroxine  Orders:    TSH, 3rd generation with Free T4 reflex; Future    Anxiety  Stable without medication       Gastroesophageal reflux disease without esophagitis  We discussed lifestyle modifications, weight loss, smaller meals, avoid lying down after eating.  Consider Pepcid AC available over-the-counter.  Contact me if symptoms fail to improve       Well adult exam  Health maintenance is up-to-date  - Consider Shingrix available at local pharmacy       Prediabetes    Orders:    Basic metabolic panel; Future    CBC and differential; Future    Lipid panel; Future    Hemoglobin A1C; Future    Hepatic function panel; Future           History of Present Illness     F/u annual physical and review labs   Feeling generally well   Overweight-doing Meggatel evette  Exercising sporadically elliptical, treadmill, jogging in place or walking 30-60 min about 2-4 days per week, but motivation to exercise waxes and wanes.    Notes more frequent gerd. Tums works most of the time.  No dysphagia or melana.  HTN-no recent home bp's   Anxiety-has been ok giving presentations and hasn't used Propranolol in quite a while  Calcific tendonitis in L shoulder waxes and wanes.  Working w/ Dr. Fox.   Allergy and asthma stable using proair for colds and heavy perfume rxn etc. Rarely needs inhaler.   Hypothyroidism-taking rx 4 am, bowels and appetite are stable.  "  PreDM-watching carbs      Review of Systems   Constitutional:  Negative for appetite change, chills, diaphoresis, fatigue, fever and unexpected weight change.   HENT:  Negative for congestion, hearing loss and rhinorrhea.    Eyes:  Negative for visual disturbance.   Respiratory:  Negative for cough, chest tightness, shortness of breath and wheezing.    Cardiovascular:  Negative for chest pain, palpitations and leg swelling.   Gastrointestinal:  Negative for abdominal pain and blood in stool.   Endocrine: Negative for cold intolerance, heat intolerance, polydipsia and polyuria.   Genitourinary:  Negative for difficulty urinating, dysuria, frequency and urgency.   Musculoskeletal:  Negative for arthralgias and myalgias.   Skin:  Negative for rash.   Neurological:  Negative for dizziness, weakness, light-headedness and headaches.   Hematological:  Does not bruise/bleed easily.   Psychiatric/Behavioral:  Negative for dysphoric mood and sleep disturbance.        Objective   /66 (BP Location: Left arm, Patient Position: Sitting, Cuff Size: Standard)   Pulse 74   Resp 16   Ht 5' 5\" (1.651 m)   Wt 81.6 kg (180 lb)   SpO2 98%   BMI 29.95 kg/m²      Physical Exam  Constitutional:       Appearance: She is well-developed.   HENT:      Head: Normocephalic and atraumatic.      Nose: Nose normal.   Eyes:      General: No scleral icterus.     Conjunctiva/sclera: Conjunctivae normal.      Pupils: Pupils are equal, round, and reactive to light.   Neck:      Thyroid: No thyromegaly.      Vascular: No JVD.      Trachea: No tracheal deviation.   Cardiovascular:      Rate and Rhythm: Normal rate and regular rhythm.      Heart sounds: No murmur heard.     No friction rub. No gallop.   Pulmonary:      Effort: Pulmonary effort is normal. No respiratory distress.      Breath sounds: Normal breath sounds. No wheezing or rales.   Musculoskeletal:         General: No deformity.      Cervical back: Normal range of motion and neck " supple.   Lymphadenopathy:      Cervical: No cervical adenopathy.   Skin:     General: Skin is warm and dry.      Coloration: Skin is not pale.      Findings: No erythema or rash.   Neurological:      Mental Status: She is alert and oriented to person, place, and time.      Cranial Nerves: No cranial nerve deficit.   Psychiatric:         Behavior: Behavior normal.         Thought Content: Thought content normal.         Judgment: Judgment normal.

## 2024-12-10 NOTE — ASSESSMENT & PLAN NOTE
Blood pressure normal today  Orders:    Basic metabolic panel; Future    CBC and differential; Future    Lipid panel; Future    Hepatic function panel; Future

## 2025-01-23 ENCOUNTER — OFFICE VISIT (OUTPATIENT)
Age: 55
End: 2025-01-23
Payer: COMMERCIAL

## 2025-01-23 VITALS
DIASTOLIC BLOOD PRESSURE: 72 MMHG | OXYGEN SATURATION: 98 % | BODY MASS INDEX: 29.99 KG/M2 | HEIGHT: 65 IN | WEIGHT: 180 LBS | HEART RATE: 77 BPM | SYSTOLIC BLOOD PRESSURE: 128 MMHG

## 2025-01-23 DIAGNOSIS — J01.90 ACUTE BACTERIAL SINUSITIS: Primary | ICD-10-CM

## 2025-01-23 DIAGNOSIS — J45.20 MILD INTERMITTENT ASTHMA, UNSPECIFIED WHETHER COMPLICATED: ICD-10-CM

## 2025-01-23 DIAGNOSIS — B96.89 ACUTE BACTERIAL SINUSITIS: Primary | ICD-10-CM

## 2025-01-23 DIAGNOSIS — R09.81 NASAL CONGESTION: ICD-10-CM

## 2025-01-23 LAB
SARS-COV-2 AG UPPER RESP QL IA: NEGATIVE
SL AMB POCT RAPID FLU A: NORMAL
SL AMB POCT RAPID FLU B: NORMAL
VALID CONTROL: NORMAL

## 2025-01-23 PROCEDURE — 99213 OFFICE O/P EST LOW 20 MIN: CPT

## 2025-01-23 PROCEDURE — 87804 INFLUENZA ASSAY W/OPTIC: CPT

## 2025-01-23 PROCEDURE — 87811 SARS-COV-2 COVID19 W/OPTIC: CPT

## 2025-01-23 RX ORDER — DOXYCYCLINE HYCLATE 100 MG
100 TABLET ORAL 2 TIMES DAILY
Qty: 14 TABLET | Refills: 0 | Status: SHIPPED | OUTPATIENT
Start: 2025-01-23 | End: 2025-01-30

## 2025-01-23 NOTE — PROGRESS NOTES
Name: Holly Hicks      : 1970      MRN: [id number]  Encounter Provider: LEIF Patricio  Encounter Date: 2025   Encounter department: [facility]  :  Assessment & Plan  Encounter for annual routine gynecological examination         Asymptomatic postmenopausal status    Orders:    Ambulatory Referral to Obstetrics / Gynecology    Screening mammogram for breast cancer    Orders:    Mammo screening bilateral w 3d and cad; Future        History of Present Illness   Holly Hicks is a 54 y.o. female who presents for annual.    Postmenopause   Vasomotor symptoms no  Sexually active yes  Monogamous   Birth control or HRT no  History of abnormal pap: yes  Last Paps  NILM, Pap  NILM, Pap  neg/neg, Pap 24 neg/neg , Next pap:   Self breast exam yes  Mammogram 24 nml, not dense. Lifetime Tyrer-Cuzick 8: 4.21%   Next Mammogram 2025 ordered   Colon Cancer screenin24, type Colonoscopy, follow up 5 years   Hereditary Cancer Screening  FH Breast/Ovarian cancer: none  FH Uterine cancer: none  FH Colon cancer:   Cancer-related family history includes Cancer in her maternal grandfather. Maternal grandfather had lung cancer     Past Medical History:   Diagnosis Date    Allergic     Anxiety     Asthma     Colon polyp     Hypoglycemia     Idiopathic angioedema     Seizures (HCC)     as a baby    Varicella      OB History    Para Term  AB Living   3 3    3   SAB IAB Ectopic Multiple Live Births             # Outcome Date GA Lbr Leandro/2nd Weight Sex Type Anes PTL Lv   3 Para            2 Para            1 Para               Obstetric Comments   Vaginal x 3     Social History     Socioeconomic History    Marital status: /Civil Union     Spouse name: None    Number of children: None    Years of education: None    Highest education level: None   Occupational History    None   Tobacco Use    Smoking status: Former      Current packs/day: 0.00     Average packs/day: 0.3 packs/day for 28.0 years (7.2 ttl pk-yrs)     Types: Cigarettes     Start date: 1980     Quit date: 2004     Years since quittin.4    Smokeless tobacco: Never   Vaping Use    Vaping status: Never Used   Substance and Sexual Activity    Alcohol use: Yes     Alcohol/week: 6.0 standard drinks of alcohol     Types: 6 Glasses of wine per week     Comment: socially     Drug use: No    Sexual activity: Yes     Partners: Male     Birth control/protection: None   Other Topics Concern    None   Social History Narrative    None     Social Drivers of Health     Financial Resource Strain: Not on file   Food Insecurity: Not on file   Transportation Needs: Not on file   Physical Activity: Sufficiently Active (2020)    Exercise Vital Sign     Days of Exercise per Week: 7 days     Minutes of Exercise per Session: 30 min   Stress: No Stress Concern Present (2020)    [employer] - Occupational Stress Questionnaire     Feeling of Stress : Not at all   Social Connections: Not on file   Intimate Partner Violence: Not on file   Housing Stability: Not on file     2 grands  Works in Mental Health for 29 years    Review of Systems   Constitutional:  Negative for activity change, chills, fatigue, fever and unexpected weight change.   HENT:  Negative for mouth sores and trouble swallowing.    Respiratory:  Negative for shortness of breath.    Gastrointestinal:  Positive for constipation. Negative for anal bleeding, blood in stool and diarrhea.   Genitourinary:  Negative for difficulty urinating, dysuria, genital sores and hematuria.   Neurological:  Negative for weakness.   Psychiatric/Behavioral:  Negative for confusion and self-injury.           Objective   Wt 79.8 kg (176 lb)   BMI 29.29 kg/m²      Physical Exam  Constitutional:       General: She is not in acute distress.     Appearance: She is well-developed.   HENT:      Head:  Normocephalic.   Pulmonary:      Effort: Pulmonary effort is normal.   Chest:   Breasts:     Breasts are symmetrical.      Right: No inverted nipple, mass, nipple discharge, skin change or tenderness.      Left: No inverted nipple, mass, nipple discharge, skin change or tenderness.   Abdominal:      Palpations: Abdomen is soft.   Genitourinary:     Exam position: Supine.      Labia:         Right: No rash, tenderness, lesion or injury.         Left: No rash, tenderness, lesion or injury.       Vagina: No signs of injury and foreign body. No vaginal discharge, erythema or tenderness.      Cervix: No cervical motion tenderness, discharge or friability.      Uterus: Not deviated, not enlarged, not fixed and not tender.       Adnexa:         Right: No mass, tenderness or fullness.          Left: No mass, tenderness or fullness.     Musculoskeletal:      Cervical back: Normal range of motion and neck supple.

## 2025-01-23 NOTE — PROGRESS NOTES
Name: Holly Hicks      : 1970      MRN: [id number]  Encounter Provider: Carolina Flores PA-C  Encounter Date: 2025   Encounter department: [facility]  :  Assessment & Plan  Acute bacterial sinusitis  Appears to be bacterial nature.  Patient allergic to penicillins and azithromycin-will start with doxycycline.  Advised if symptoms do not improve by the completion of the antibiotic course to reach out for further evaluation/workup.  Orders:    doxycycline hyclate (VIBRA-TABS) 100 mg tablet; Take 1 tablet (100 mg total) by mouth 2 (two) times a day for 7 days    Nasal congestion  Rapid flu and COVID-negative at office  Orders:    POCT Rapid Covid Ag    POCT rapid flu A and B    Mild intermittent asthma, unspecified whether complicated  Lungs clear on exam.              History of Present Illness   Patient is 54 year old female presenting for an acute visit.  Started with mild sore throat and post nasal drip last Wednesday 1/15/25. The symptoms started to get a little better over the weekend and then Monday/Tuesday she started to notice it got worse.  Denies fever, cough, n/v/d  Endorses fatigue   Endorses she is eating and drinking okay.  Endorses moderate nasal congestion, sinus pressure.  Has taken allegra, flonase for it with minimal relief.       Review of Systems   Constitutional:  Positive for fatigue. Negative for chills and fever.   HENT:  Positive for postnasal drip, rhinorrhea, sinus pressure and sinus pain. Negative for ear discharge, ear pain, sore throat, tinnitus and trouble swallowing.    Eyes:  Negative for pain, discharge and itching.   Respiratory:  Negative for cough, shortness of breath and wheezing.    Cardiovascular:  Negative for chest pain, palpitations and leg swelling.   Gastrointestinal:  Negative for abdominal pain, constipation, diarrhea, nausea and vomiting.   Endocrine: Negative for polydipsia, polyphagia and polyuria.  "  Genitourinary:  Negative for difficulty urinating, frequency, hematuria and urgency.   Musculoskeletal:  Negative for arthralgias, joint swelling and myalgias.   Skin:  Negative for color change.   Allergic/Immunologic: Negative for environmental allergies.   Neurological:  Negative for dizziness, weakness, light-headedness, numbness and headaches.   Hematological:  Negative for adenopathy.   Psychiatric/Behavioral:  Negative for decreased concentration and sleep disturbance. The patient is not nervous/anxious.        Objective   /72   Pulse 77   Ht 5' 5\" (1.651 m)   Wt 81.6 kg (180 lb)   SpO2 98%   BMI 29.95 kg/m²      Physical Exam  Vitals and nursing note reviewed.   Constitutional:       General: She is awake. She is not in acute distress.     Appearance: Normal appearance. She is well-developed, well-groomed and normal weight.   HENT:      Head: Normocephalic and atraumatic.      Right Ear: Hearing and external ear normal.      Left Ear: Hearing and external ear normal.      Nose: Congestion present.      Mouth/Throat:      Lips: Pink.      Mouth: Mucous membranes are moist.      Pharynx: No oropharyngeal exudate or posterior oropharyngeal erythema.   Eyes:      General: Lids are normal. Vision grossly intact. Gaze aligned appropriately.      Conjunctiva/sclera: Conjunctivae normal.   Neck:      Vascular: No carotid bruit.      Trachea: Trachea and phonation normal.   Cardiovascular:      Rate and Rhythm: Normal rate and regular rhythm.      Heart sounds: Normal heart sounds, S1 normal and S2 normal. No murmur heard.     No friction rub. No gallop.   Pulmonary:      Effort: Pulmonary effort is normal. No respiratory distress.      Breath sounds: Normal breath sounds and air entry. No decreased breath sounds, wheezing, rhonchi or rales.   Abdominal:      General: Abdomen is flat. Bowel sounds are normal.      Palpations: Abdomen is soft.      Tenderness: There is no abdominal tenderness. "   Musculoskeletal:         General: No swelling.      Cervical back: Neck supple.      Right lower leg: No edema.      Left lower leg: No edema.   Lymphadenopathy:      Cervical: No cervical adenopathy.   Skin:     General: Skin is warm.      Capillary Refill: Capillary refill takes less than 2 seconds.   Neurological:      Mental Status: She is alert.   Psychiatric:         Attention and Perception: Attention and perception normal.         Mood and Affect: Mood and affect normal.         Speech: Speech normal.         Behavior: Behavior normal. Behavior is cooperative.         Thought Content: Thought content normal.         Cognition and Memory: Cognition and memory normal.         Judgment: Judgment normal.

## 2025-01-23 NOTE — PATIENT INSTRUCTIONS
Patient Education     Lowering Your Risk of Breast Cancer   About this topic   Breast cancer is a serious illness. Breast cancer is when abnormal cells grow and divide more quickly in your breast. These cells form a growth or tumor. The abnormal cells may enter nearby tissue and spread to other parts of the body. It is the type of cancer most often seen in women. Men can have breast cancer, but it is a rare condition.  General   Some things in your life may increase your risk of breast cancer. You may not be able to change some of these. Others you can control.  You are more likely to get breast cancer if you:  Have a mother, sister, or daughter who has had breast cancer  Have used hormones for menopause for more than 5 years  Have had radiation therapy to the breast or chest in the past  Are overweight or do not exercise  Had your first menstrual period before you were 11 years old  Went through menopause after age 53  Have never been pregnant or had your first child after age 35  Have had breast cancer before  Drink alcohol in any form  Have dense breasts  Are older in age  There is no certain way to prevent breast cancer. There are things you can do to lower your chances of having breast cancer.  Keep a healthy weight. Lose weight if you are overweight. Being overweight raises your chances of having breast cancer.  Eat a healthy diet to maintain a healthy weight, such as more fruits, vegetables, and lean cuts of meat. Decrease the amount of saturated fat in your diet.  Exercise. Being active helps you keep a healthy weight.  Limit your alcohol intake or do not drink alcohol. The more alcohol you drink, the higher your risk.  Do not smoke cigarettes. Smoking can increase your risk of many types of cancer.  Breastfeed your baby. This may help protect you. The longer you breastfeed, the more protection you have.  Talk with your doctor about:  Limiting or stopping hormone therapy.  Taking certain drugs to prevent  breast cancer. For women at high risk of having breast cancer, there are a few drugs that may lower your risk.  Surgery to prevent you from having breast cancer if you are very high risk.  When do I need to call the doctor?   Changes in your breasts  A lump or area in your breast that feels different  Discharge from your nipple  Skin on your breast is dimpled or indented  You have questions or concerns about your breasts  Helpful tips   Talk to your doctor about the best kind of breast cancer screening for you.  If you want to do self breast exams, have your doctor show you the right way to do them.  Tell your doctor of any abnormal finding.  Last Reviewed Date   2021-10-04  Consumer Information Use and Disclaimer   This generalized information is a limited summary of diagnosis, treatment, and/or medication information. It is not meant to be comprehensive and should be used as a tool to help the user understand and/or assess potential diagnostic and treatment options. It does NOT include all information about conditions, treatments, medications, side effects, or risks that may apply to a specific patient. It is not intended to be medical advice or a substitute for the medical advice, diagnosis, or treatment of a health care provider based on the health care provider's examination and assessment of a patient’s specific and unique circumstances. Patients must speak with a health care provider for complete information about their health, medical questions, and treatment options, including any risks or benefits regarding use of medications. This information does not endorse any treatments or medications as safe, effective, or approved for treating a specific patient. [employer] and its affiliates disclaim any warranty or liability relating to this information or the use thereof. The use of this information is governed by the Terms of Use, available at  https://www.[employer] solutionuwer.com/en/know/clinical-effectiveness-terms   Copyright   Copyright © 2024 [employer], Inc[employer] and its affiliates and/or licensors. All rights reserved.

## 2025-01-27 ENCOUNTER — CONSULT (OUTPATIENT)
Dept: OBGYN CLINIC | Facility: CLINIC | Age: 55
End: 2025-01-27
Payer: COMMERCIAL

## 2025-01-27 VITALS — BODY MASS INDEX: 29.29 KG/M2 | WEIGHT: 176 LBS

## 2025-01-27 DIAGNOSIS — Z01.419 ENCOUNTER FOR ANNUAL ROUTINE GYNECOLOGICAL EXAMINATION: Primary | ICD-10-CM

## 2025-01-27 DIAGNOSIS — Z78.0 ASYMPTOMATIC POSTMENOPAUSAL STATUS: ICD-10-CM

## 2025-01-27 DIAGNOSIS — Z12.31 SCREENING MAMMOGRAM FOR BREAST CANCER: ICD-10-CM

## 2025-01-27 PROBLEM — M25.571 RIGHT ANKLE PAIN: Status: RESOLVED | Noted: 2023-01-23 | Resolved: 2025-01-27

## 2025-01-27 PROCEDURE — 99386 PREV VISIT NEW AGE 40-64: CPT | Performed by: NURSE PRACTITIONER

## 2025-01-27 NOTE — LETTER
2025     Abraham Lyles MD  [address]  [address]  [facility] [address]    Patient: Holly Hicks   YOB: 1970   Date of Visit: 2025       Dear Dr. Lyles:    Thank you for referring Holly Hicks to me for evaluation. Below are my notes for this consultation.    If you have questions, please do not hesitate to call me. I look forward to following your patient along with you.         Sincerely,        LEIF Patricio        CC: No Recipients    LEIF Patricio  2025  8:33 AM  Sign when Signing Visit  Name: Holly Hicks      : 1970      MRN: [id number]  Encounter Provider: LEIF Patricio  Encounter Date: 2025   Encounter department: [facility]  :  Assessment & Plan  Encounter for annual routine gynecological examination         Asymptomatic postmenopausal status    Orders:  •  Ambulatory Referral to Obstetrics / Gynecology    Screening mammogram for breast cancer    Orders:  •  Mammo screening bilateral w 3d and cad; Future        History of Present Illness  Holly Hicks is a 54 y.o. female who presents for annual.    Postmenopause   Vasomotor symptoms no  Sexually active yes  Monogamous   Birth control or HRT no  History of abnormal pap: yes  Last Paps  NILM, Pap  NILM, Pap  neg/neg, Pap 24 neg/neg , Next pap:   Self breast exam yes  Mammogram 24 nml, not dense. Lifetime Ninfaer-Chapock 8: 4.21%   Next Mammogram 2025 ordered   Colon Cancer screenin24, type Colonoscopy, follow up 5 years   Hereditary Cancer Screening  FH Breast/Ovarian cancer: none  FH Uterine cancer: none  FH Colon cancer:   Cancer-related family history includes Cancer in her maternal grandfather. Maternal grandfather had lung cancer     Past Medical History:   Diagnosis Date   • Allergic    • Anxiety    • Asthma    • Colon polyp    • Hypoglycemia    • Idiopathic angioedema    •  Seizures (HCC)     as a baby   • Varicella      OB History    Para Term  AB Living   3 3    3   SAB IAB Ectopic Multiple Live Births             # Outcome Date GA Lbr Leandro/2nd Weight Sex Type Anes PTL Lv   3 Para            2 Para            1 Para               Obstetric Comments   Vaginal x 3     Social History     Socioeconomic History   • Marital status: /Civil Union     Spouse name: None   • Number of children: None   • Years of education: None   • Highest education level: None   Occupational History   • None   Tobacco Use   • Smoking status: Former     Current packs/day: 0.00     Average packs/day: 0.3 packs/day for 28.0 years (7.2 ttl pk-yrs)     Types: Cigarettes     Start date: 1980     Quit date: 2004     Years since quittin.4   • Smokeless tobacco: Never   Vaping Use   • Vaping status: Never Used   Substance and Sexual Activity   • Alcohol use: Yes     Alcohol/week: 6.0 standard drinks of alcohol     Types: 6 Glasses of wine per week     Comment: socially    • Drug use: No   • Sexual activity: Yes     Partners: Male     Birth control/protection: None   Other Topics Concern   • None   Social History Narrative   • None     Social Drivers of Health     Financial Resource Strain: Not on file   Food Insecurity: Not on file   Transportation Needs: Not on file   Physical Activity: Sufficiently Active (2020)    Exercise Vital Sign    • Days of Exercise per Week: 7 days    • Minutes of Exercise per Session: 30 min   Stress: No Stress Concern Present (2020)    [employer] [place] [employer] - Occupational Stress Questionnaire    • Feeling of Stress : Not at all   Social Connections: Not on file   Intimate Partner Violence: Not on file   Housing Stability: Not on file     2 grands  Works in Mental Health for 29 years    Review of Systems   Constitutional:  Negative for activity change, chills, fatigue, fever and unexpected weight change.   HENT:  Negative for  mouth sores and trouble swallowing.    Respiratory:  Negative for shortness of breath.    Gastrointestinal:  Positive for constipation. Negative for anal bleeding, blood in stool and diarrhea.   Genitourinary:  Negative for difficulty urinating, dysuria, genital sores and hematuria.   Neurological:  Negative for weakness.   Psychiatric/Behavioral:  Negative for confusion and self-injury.           Objective  Wt 79.8 kg (176 lb)   BMI 29.29 kg/m²      Physical Exam  Constitutional:       General: She is not in acute distress.     Appearance: She is well-developed.   HENT:      Head: Normocephalic.   Pulmonary:      Effort: Pulmonary effort is normal.   Chest:   Breasts:     Breasts are symmetrical.      Right: No inverted nipple, mass, nipple discharge, skin change or tenderness.      Left: No inverted nipple, mass, nipple discharge, skin change or tenderness.   Abdominal:      Palpations: Abdomen is soft.   Genitourinary:     Exam position: Supine.      Labia:         Right: No rash, tenderness, lesion or injury.         Left: No rash, tenderness, lesion or injury.       Vagina: No signs of injury and foreign body. No vaginal discharge, erythema or tenderness.      Cervix: No cervical motion tenderness, discharge or friability.      Uterus: Not deviated, not enlarged, not fixed and not tender.       Adnexa:         Right: No mass, tenderness or fullness.          Left: No mass, tenderness or fullness.     Musculoskeletal:      Cervical back: Normal range of motion and neck supple.